# Patient Record
Sex: MALE | Race: WHITE | NOT HISPANIC OR LATINO | Employment: FULL TIME | URBAN - METROPOLITAN AREA
[De-identification: names, ages, dates, MRNs, and addresses within clinical notes are randomized per-mention and may not be internally consistent; named-entity substitution may affect disease eponyms.]

---

## 2020-09-09 ENCOUNTER — HOSPITAL ENCOUNTER (OUTPATIENT)
Dept: OTHER | Facility: HOSPITAL | Age: 60
Discharge: HOME | End: 2020-09-09
Attending: EMERGENCY MEDICINE

## 2020-10-23 ENCOUNTER — HOSPITAL ENCOUNTER (OUTPATIENT)
Dept: MRI IMAGING | Facility: HOSPITAL | Age: 60
Discharge: HOME | End: 2020-10-23
Attending: PHYSICIAN ASSISTANT

## 2020-11-16 ENCOUNTER — OFFICE VISIT (OUTPATIENT)
Dept: ORTHOPEDIC SURGERY | Facility: CLINIC | Age: 60
End: 2020-11-16

## 2020-11-16 VITALS — WEIGHT: 226 LBS | TEMPERATURE: 98.1 F | BODY MASS INDEX: 34.25 KG/M2 | HEIGHT: 68 IN

## 2020-11-16 DIAGNOSIS — S46.012A TRAUMATIC COMPLETE TEAR OF LEFT ROTATOR CUFF, INITIAL ENCOUNTER: Primary | ICD-10-CM

## 2020-11-16 PROCEDURE — 99204 OFFICE O/P NEW MOD 45 MIN: CPT | Performed by: ORTHOPAEDIC SURGERY

## 2020-11-16 RX ORDER — DICLOFENAC SODIUM 75 MG/1
75 TABLET, DELAYED RELEASE ORAL AS NEEDED
COMMUNITY
Start: 2020-10-27 | End: 2021-01-15 | Stop reason: HOSPADM

## 2020-11-16 RX ORDER — METHOCARBAMOL 750 MG/1
750 TABLET, FILM COATED ORAL AS NEEDED
Status: ON HOLD | COMMUNITY
Start: 2020-10-27 | End: 2021-07-29

## 2020-11-16 NOTE — PROGRESS NOTES
NEW VISIT    Patient: Nba Gaffney  ?  YOB: 1960    MRN: [id number]  ?  Chief Complaint   Patient presents with   • Left Shoulder - Pain      ?  HPI:   Nba Lui presents to the office with a work-related injury.  He works as a mechanical .  He works for [employer] in the [facility] when he fell awkwardly.  He braced his fall with his outstretched hand on the left side.  He has had significant pain and discomfort from his left shoulder with weakness in abduction.  Date of injury is 8 September 2020.  He was seen by his primary care physician and diagnosed with a rotator cuff tear.  He has quite a bit of pain and discomfort and is unable to perform his job since he is fallen in that ditch.  He has difficulty with sleeping in bed at night as well.  An MRI was obtained which has confirmed the diagnosis of rotator cuff tear.      Pain Location: LEFT shoulder  Radiation: none  Quality: aching, sharp, stabbing  Intensity/Severity: moderate to severe  Duration: since 09/08/2020   Onset quality: sudden after work related injury   Timing: constant  Aggravating Factors: overhead reaching, reaching backward, reaching forward, reaching across body  Alleviating Factors: NSAIDs  Previous Episodes: none mentioned  Associated Symptoms: pain, swelling, decreased strength  ADLs Affected: work related activities, recreational activities/sports  Previous Treatment: Anti-inflammatory medication.    This patient is an established patient.  This problem is new to this examiner.      Allergies: No Known Allergies    Medications:   Home Medications:  Current Outpatient Medications on File Prior to Visit   Medication Sig   • diclofenac (VOLTAREN) 75 MG EC tablet TK 1 T PO Q 12 H   • methocarbamol (ROBAXIN) 750 MG tablet TK 1 T PO Q 8 H     No current facility-administered medications on file prior to visit.      Current Medications:  Scheduled Meds:  PRN Meds:.    I have  "reviewed the patient's medical history in detail and updated the computerized patient record.  Review and summarization of old records include:    Past Medical History:   Diagnosis Date   • Environmental allergies    • Sleep disorder      Past Surgical History:   Procedure Laterality Date   • ELBOW PROCEDURE Right    • FEMUR FRACTURE SURGERY     • HIP SURGERY Left    • SHOULDER SURGERY Right    • WRIST SURGERY Right     wrist orif     Social History     Occupational History   • Not on file   Tobacco Use   • Smoking status: Current Every Day Smoker   Substance and Sexual Activity   • Alcohol use: Not Currently   • Drug use: Not on file   • Sexual activity: Not on file      History reviewed. No pertinent family history.      Review of Systems   Constitutional: Negative.    HENT: Negative.    Eyes: Negative.    Respiratory: Negative.    Cardiovascular: Negative.    Endocrine: Negative.    Musculoskeletal: Positive for arthralgias.   Skin: Negative.    Allergic/Immunologic: Negative.    Neurological: Negative.    Hematological: Negative.    Psychiatric/Behavioral: Negative.           Wt Readings from Last 3 Encounters:   11/16/20 103 kg (226 lb)     Ht Readings from Last 3 Encounters:   11/16/20 172.7 cm (68\")     Body mass index is 34.36 kg/m².  Facility age limit for growth percentiles is 20 years.  Vitals:    11/16/20 1541   Temp: 98.1 °F (36.7 °C)         Physical Exam  Constitutional: Patient is oriented to person, place, and time. Appears well-developed and well-nourished.   HENT:   Head: Normocephalic and atraumatic.   Eyes: Conjunctivae and EOM are normal. Pupils are equal, round, and reactive to light.   Cardiovascular: Normal rate, regular rhythm, normal heart sounds and intact distal pulses.   Pulmonary/Chest: Effort normal and breath sounds normal.   Musculoskeletal:   See detailed exam below   Neurological: Alert and oriented to person, place, and time. No sensory deficit. Coordination normal.   Skin: Skin " is warm and dry. Capillary refill takes less than 2 seconds. No rash noted. No erythema.   Psychiatric: Patient has a normal mood and affect. His behavior is normal. Judgment and thought content normal.   Nursing note and vitals reviewed.      Ortho Exam:   Left shoulder (cta). Rotator cuff function is extremely impaired. There is a high riding humeral head with articulation of the humerus to the undersurface of the acromiohumeral articulation. AC joint is exquisitely tender and painful for patient. Axillary nerve function is well preserved. There is significant winging of the scapula with hollowing of the fossae over the proximal and distal aspects of the spine of the scapula. Forward flexion is 0-90 degrees, active abduction is 0-90 degrees. Drop arm sign is positive. External rotation against resistance is extremely painful and limited for the patient. Skin and soft tissues are essentially normal other than some amounts of swelling. The pain level is 5      Diagnostics:  Reviewed MRI report of left shoulder, performed at  [facility] on 09/09/2020, summary of impression below:  MRI reports are available in the office today.  These images are discussed with the patient.  There is a tear of the posterior inferior glenoid labrum.  There is a partial tear of the biceps tendon long head as well.  There is a focal area of full-thickness tear of the anterior aspect of the supraspinatus as it inserts onto the greater tuberosity of the humerus.  The site of the tear is at the footprint and measures 1.3 cm in size.  The glenohumeral joint also shows a partial-thickness tear.  The glenohumeral joint itself is fairly well-preserved.  My recommendation to proceed with surgical repair of the rotator cuff is based on a clinical composite of MRI findings as well as these MRI images.     Assessment:  Diagnoses and all orders for this visit:    1. Traumatic complete tear of left rotator cuff, initial encounter  (Primary)          Procedures  ?    Plan    · Extensive discussion with the patient in the office about surgical intervention.  Risks and benefits of surgical intervention discussed with the patient at length.  Time spent in the office today with the patient is 45 minutes of which greater than 50% of my time was spent face-to-face going over treatment options and the rationale for surgical intervention and the potential complications of the surgery.  · Continue with tablet Voltaren 75 mg tab 1 p.o. twice daily for pain swelling and inflammation.  · He has been given a prescription of tablet Robaxin 750 mg tab 1 p.o. every 8 for muscle spasms by his primary care physician.  · Rest, ice, compression, and elevation (RICE) therapy  · Stretching and strengthening exercises  · Alternate Ibuprofen and Tylenol as needed  · Schedule left rotator cuff repair.   Patient has been diagnosed with a rotator cuff tear.  These tears can range from partial tears to complete tears of the muscle and/or tendon. They can also be categorized in size by width as being small, medium or large.  Diagnosis is confirmed with MRI or CT/arthrogram.  Management of these tears can be conservative with injections, physical therapy, activity modification and use of NSAIDS as needed.  Surgery is the only way to actually fix these tears, as they will not heal or repair on their own.  These tears can usually be repaired with arthroscopic surgery, but occasionally open procedures are necessary.  Many factors can influence the outcomes. First, larger tears have a higher chance of failure from a healing perspective, although pain may improve nonetheless, potentially longer recovery and sometimes, depending on the quality of the tissue and the length of time the tear has been present, may not be repairable at all.  If the tear has been going on for a long time, the muscle can actually change to fatty tissue, and no longer act as muscle.  This can have a  direct effect on not only the ability to repair the tear but also the outcome from the surgery itself both from a healing and functional perspective.  Therefore, the decision to proceed with surgery does have a time factor which can affect the quality of the tissue and reparability but also the potential for the tear to increase in size.  The longer you wait to repair the torn tendon there can be decreased potential for a successful outcome.    · Risks of surgery have been discussed with the patient in detail.  These risks include but are not limited to possibility of infection, DVT, continued pain, continued progression of arthritis, use of allograft tissue, limited range of motion and strength and further surgical intervention.  Patient understands that the surgery will benefit mechanical symptoms of pain and instability but may not help with symptoms of arthritis.    I advised the patient of the risks in continuing to use tobacco, and I provided this patient with smoking cessation educational materials.  We discussed using Nicotine gum and/or patches, Hypnotherapy, and Psychological Counseling.   I also discussed how to quit smoking and the patient has expressed the willingness to quit.    During this visit, I spent > 3-10 minutes counseling the patient regarding smoking cessation.   ·     Date of encounter: 11/16/2020   Noah Shaw MD

## 2020-11-18 PROBLEM — S46.012A TRAUMATIC COMPLETE TEAR OF LEFT ROTATOR CUFF: Status: ACTIVE | Noted: 2020-11-18

## 2020-11-18 RX ORDER — CEFAZOLIN SODIUM 2 G/100ML
2 INJECTION, SOLUTION INTRAVENOUS ONCE
Status: CANCELLED | OUTPATIENT
Start: 2021-01-15 | End: 2020-11-18

## 2020-12-01 ENCOUNTER — TELEPHONE (OUTPATIENT)
Dept: ORTHOPEDICS | Facility: OTHER | Age: 60
End: 2020-12-01

## 2020-12-01 ENCOUNTER — TELEPHONE (OUTPATIENT)
Dept: ORTHOPEDIC SURGERY | Facility: CLINIC | Age: 60
End: 2020-12-01

## 2020-12-01 NOTE — TELEPHONE ENCOUNTER
I AM LOOKING AT THIS AND DR WANG WANTS IT DONE AT [facility] ACCORDING TO THE FACESHEET BUT HE IS WORK COMP. I WILL START WORKING ON THE APPROVAL TODAY./AW

## 2020-12-01 NOTE — TELEPHONE ENCOUNTER
CALLED PATIENT AND LET HIM KNOW THAT I WOULD BE WORKING ON GETTING HIS SURGERY APPROVED WITH WORK [employer] AND THAT ONCE IT WAS APPROVED LINDSEY FROM [place] WOULD CALL HIM TO SCHEDULE HIM AT [facility]./AW

## 2020-12-01 NOTE — TELEPHONE ENCOUNTER
PT. CALLING BACK TO SCHEDULE LEFT SHOULDER SURGERY WITH DR. WANG.   HE STATES THAT IT IS UNDER WORK COMP.   HE STATES THAT HE KNEW THE SURGERY SCHEDULER WAS OUT UNTIL 11/30/20, SO HE IS JUST FOLLOWING UP.   PLEASE CALL TO ADVISE.   PT# [phone]

## 2020-12-01 NOTE — TELEPHONE ENCOUNTER
JUST LET ME KNOW WHEN THIS IS APPROVED AND I WILL GET HIM SCHEDULED AT [facility]

## 2020-12-04 ENCOUNTER — TELEPHONE (OUTPATIENT)
Dept: ORTHOPEDIC SURGERY | Facility: CLINIC | Age: 60
End: 2020-12-04

## 2020-12-04 NOTE — TELEPHONE ENCOUNTER
THIS PATIENT'S LEFT SHOULDER SURGERY HAS BEEN APPROVED AND THE APPROVAL LETTER IS IN MEDIA IN THE PATIENT'S CHART

## 2021-01-05 ENCOUNTER — TELEPHONE (OUTPATIENT)
Dept: ORTHOPEDIC SURGERY | Facility: CLINIC | Age: 61
End: 2021-01-05

## 2021-01-05 NOTE — TELEPHONE ENCOUNTER
Patient is scheduled for shoulder arthroscopy and rotator cuff repair on the 15th at [facility].  He would like to ask about the covid testing, he states he was supposed to have received a notice in the mail and he hasn't and he is wondering if he can have that done in [place].  Please call the patient and advise.

## 2021-01-13 ENCOUNTER — APPOINTMENT (OUTPATIENT)
Dept: PREADMISSION TESTING | Facility: HOSPITAL | Age: 61
End: 2021-01-13

## 2021-01-13 VITALS
WEIGHT: 230 LBS | TEMPERATURE: 98 F | SYSTOLIC BLOOD PRESSURE: 180 MMHG | RESPIRATION RATE: 18 BRPM | BODY MASS INDEX: 34.86 KG/M2 | OXYGEN SATURATION: 97 % | HEART RATE: 101 BPM | DIASTOLIC BLOOD PRESSURE: 70 MMHG | HEIGHT: 68 IN

## 2021-01-13 DIAGNOSIS — S46.012A TRAUMATIC COMPLETE TEAR OF LEFT ROTATOR CUFF, INITIAL ENCOUNTER: ICD-10-CM

## 2021-01-13 LAB
ANION GAP SERPL CALCULATED.3IONS-SCNC: 9.4 MMOL/L (ref 5–15)
BILIRUB UR QL STRIP: NEGATIVE
BUN SERPL-MCNC: 10 MG/DL (ref 6–20)
BUN/CREAT SERPL: 12.3 (ref 7–25)
CALCIUM SPEC-SCNC: 9.3 MG/DL (ref 8.6–10.5)
CHLORIDE SERPL-SCNC: 104 MMOL/L (ref 98–107)
CLARITY UR: CLEAR
CO2 SERPL-SCNC: 25.6 MMOL/L (ref 22–29)
COLOR UR: YELLOW
CREAT SERPL-MCNC: 0.81 MG/DL (ref 0.76–1.27)
DEPRECATED RDW RBC AUTO: 44.8 FL (ref 37–54)
ERYTHROCYTE [DISTWIDTH] IN BLOOD BY AUTOMATED COUNT: 14.2 % (ref 12.3–15.4)
GFR SERPL CREATININE-BSD FRML MDRD: 98 ML/MIN/1.73
GLUCOSE SERPL-MCNC: 90 MG/DL (ref 65–99)
GLUCOSE UR STRIP-MCNC: NEGATIVE MG/DL
HCT VFR BLD AUTO: 45 % (ref 37.5–51)
HGB BLD-MCNC: 15.8 G/DL (ref 13–17.7)
HGB UR QL STRIP.AUTO: NEGATIVE
KETONES UR QL STRIP: NEGATIVE
LEUKOCYTE ESTERASE UR QL STRIP.AUTO: NEGATIVE
MCH RBC QN AUTO: 30.7 PG (ref 26.6–33)
MCHC RBC AUTO-ENTMCNC: 35.1 G/DL (ref 31.5–35.7)
MCV RBC AUTO: 87.4 FL (ref 79–97)
NITRITE UR QL STRIP: NEGATIVE
PH UR STRIP.AUTO: 7 [PH] (ref 5–8)
PLATELET # BLD AUTO: 399 10*3/MM3 (ref 140–450)
PMV BLD AUTO: 9.1 FL (ref 6–12)
POTASSIUM SERPL-SCNC: 4.3 MMOL/L (ref 3.5–5.2)
PROT UR QL STRIP: NEGATIVE
RBC # BLD AUTO: 5.15 10*6/MM3 (ref 4.14–5.8)
SODIUM SERPL-SCNC: 139 MMOL/L (ref 136–145)
SP GR UR STRIP: 1.01 (ref 1–1.03)
UROBILINOGEN UR QL STRIP: NORMAL
WBC # BLD AUTO: 13.97 10*3/MM3 (ref 3.4–10.8)

## 2021-01-13 PROCEDURE — 80048 BASIC METABOLIC PNL TOTAL CA: CPT

## 2021-01-13 PROCEDURE — U0004 COV-19 TEST NON-CDC HGH THRU: HCPCS

## 2021-01-13 PROCEDURE — 85027 COMPLETE CBC AUTOMATED: CPT

## 2021-01-13 PROCEDURE — C9803 HOPD COVID-19 SPEC COLLECT: HCPCS

## 2021-01-13 PROCEDURE — 81003 URINALYSIS AUTO W/O SCOPE: CPT

## 2021-01-13 PROCEDURE — 36415 COLL VENOUS BLD VENIPUNCTURE: CPT

## 2021-01-13 NOTE — DISCHARGE INSTRUCTIONS
Take the following medications the morning of surgery: NONE    ARRIVE AT 9AM    If you are on prescription narcotic pain medication to control your pain you may also take that medication the morning of surgery.    General Instructions:  • Do not eat solid food after midnight the night before surgery.  • You may drink clear liquids day of surgery but must stop at least one hour before your hospital arrival time.  • It is beneficial for you to have a clear drink that contains carbohydrates the day of surgery.  We suggest a 12 to 20 ounce bottle of Gatorade or Powerade for non-diabetic patients or a 12 to 20 ounce bottle of G2 or Powerade Zero for diabetic patients. (Pediatric patients, are not advised to drink a 12 to 20 ounce carbohydrate drink)    Clear liquids are liquids you can see through.  Nothing red in color.     Plain water                               Sports drinks  Sodas                                   Gelatin (Jell-O)  Fruit juices without pulp such as white grape juice and apple juice  Popsicles that contain no fruit or yogurt  Tea or coffee (no cream or milk added)  Gatorade / Powerade  G2 / Powerade Zero    • Infants may have breast milk up to four hours before surgery.  • Infants drinking formula may drink formula up to six hours before surgery.   • Patients who avoid smoking, chewing tobacco and alcohol for 4 weeks prior to surgery have a reduced risk of post-operative complications.  Quit smoking as many days before surgery as you can.  • Do not smoke, use chewing tobacco or drink alcohol the day of surgery.   • If applicable bring your C-PAP/ BI-PAP machine.  • Bring any papers given to you in the doctor’s office.  • Wear clean comfortable clothes.  • Do not wear contact lenses, false eyelashes or make-up.  Bring a case for your glasses.   • Bring crutches or walker if applicable.  • Remove all piercings.  Leave jewelry and any other valuables at home.  • Hair extensions with metal clips must be  removed prior to surgery.  • The Pre-Admission Testing nurse will instruct you to bring medications if unable to obtain an accurate list in Pre-Admission Testing.            Preventing a Surgical Site Infection:  • For 2 to 3 days before surgery, avoid shaving with a razor because the razor can irritate skin and make it easier to develop an infection.    • Any areas of open skin can increase the risk of a post-operative wound infection by allowing bacteria to enter and travel throughout the body.  Notify your surgeon if you have any skin wounds / rashes even if it is not near the expected surgical site.  The area will need assessed to determine if surgery should be delayed until it is healed.  • The night prior to surgery shower using a fresh bar of anti-bacterial soap (such as Dial) and clean washcloth.  Sleep in a clean bed with clean clothing.  Do not allow pets to sleep with you.  • Shower on the morning of surgery using a fresh bar of anti-bacterial soap (such as Dial) and clean washcloth.  Dry with a clean towel and dress in clean clothing.  • Ask your surgeon if you will be receiving antibiotics prior to surgery.  • Make sure you, your family, and all healthcare providers clean their hands with soap and water or an alcohol based hand  before caring for you or your wound.    Day of surgery:  Your arrival time is approximately two hours before your scheduled surgery time.  Upon arrival, a Pre-op nurse and Anesthesiologist will review your health history, obtain vital signs, and answer questions you may have.  The only belongings needed at this time will be a list of your home medications and if applicable your C-PAP/BI-PAP machine.  A Pre-op nurse will start an IV and you may receive medication in preparation for surgery, including something to help you relax.     Please be aware that surgery does come with discomfort.  We want to make every effort to control your discomfort so please discuss any  uncontrolled symptoms with your nurse.   Your doctor will most likely have prescribed pain medications.      If you are going home after surgery you will receive individualized written care instructions before being discharged.  A responsible adult must drive you to and from the hospital on the day of your surgery and stay with you for 24 hours.  Discharge prescriptions can be filled by the hospital pharmacy during regular pharmacy hours.  If you are having surgery late in the day/evening your prescription may be e-prescribed to your pharmacy.  Please verify your pharmacy hours or chose a 24 hour pharmacy to avoid not having access to your prescription because your pharmacy has closed for the day.    If you are staying overnight following surgery, you will be transported to your hospital room following the recovery period.  [facility] has all private rooms.    If you have any questions please call Pre-Admission Testing at [phone].  Deductibles and co-payments are collected on the day of service. Please be prepared to pay the required co-pay, deductible or deposit on the day of service as defined by your plan.    Patient Education for Self-Quarantine Process    Following your COVID testing, we strongly recommend that you do not leave your home after you have been tested for COVID except to get medical care. This includes not going to work, school or to public areas.  If this is not possible for you to do please limit your activities to only required outings.  Be sure to wear a mask when you are with other people, practice social distancing and wash your hands frequently.      The following items provide additional details to keep you safe.  • Wash your hands with soap and water frequently for at least 20 seconds.   • Avoid touching your eyes, nose and mouth with unwashed hands.  • Do not share anything - utensils, towels, food from the same bowl.   • Have your own utensils, drinking glass,  dishes, towels and bedding.   • Do not have visitors.   • Do use FaceTime to stay in touch with family and friends.  • You should stay in a specific room away from others if possible.   • Stay at least 6 feet away from others in the home if you cannot have a dedicated room to yourself.   • Do not snuggle with your pet. While the CDC says there is no evidence that pets can spread COVID-19 or be infected from humans, it is probably best to avoid “petting, snuggling, being kissed or licked and sharing food (during self-quarantine)”, according to the CDC.   • Sanitize household surfaces daily. Include all high touch areas (door handles, light switches, phones, countertops, etc.)  • Do not share a bathroom with others, if possible.   • Wear a mask around others in your home if you are unable to stay in a separate room or 6 feet apart. If  you are unable to wear a mask, have your family member wear a mask if they must be within 6 feet of you.   Call your surgeon immediately if you experience any of the following symptoms:  • Sore Throat  • Shortness of Breath or difficulty breathing  • Cough  • Chills  • Body soreness or muscle pain  • Headache  • Fever  • New loss of taste or smell  • Do not arrive for your surgery ill.  Your procedure will need to be rescheduled to another time.  You will need to call your physician before the day of surgery to avoid any unnecessary exposure to hospital staff as well as other patients.

## 2021-01-14 LAB — SARS-COV-2 ORF1AB RESP QL NAA+PROBE: NOT DETECTED

## 2021-01-15 ENCOUNTER — ANESTHESIA EVENT (OUTPATIENT)
Dept: PERIOP | Facility: HOSPITAL | Age: 61
End: 2021-01-15

## 2021-01-15 ENCOUNTER — HOSPITAL ENCOUNTER (OUTPATIENT)
Facility: HOSPITAL | Age: 61
Setting detail: HOSPITAL OUTPATIENT SURGERY
Discharge: HOME | End: 2021-01-15
Attending: ORTHOPAEDIC SURGERY | Admitting: ORTHOPAEDIC SURGERY

## 2021-01-15 ENCOUNTER — ANESTHESIA (OUTPATIENT)
Dept: PERIOP | Facility: HOSPITAL | Age: 61
End: 2021-01-15

## 2021-01-15 VITALS
OXYGEN SATURATION: 95 % | HEART RATE: 71 BPM | SYSTOLIC BLOOD PRESSURE: 125 MMHG | TEMPERATURE: 97.7 F | DIASTOLIC BLOOD PRESSURE: 71 MMHG | RESPIRATION RATE: 16 BRPM

## 2021-01-15 DIAGNOSIS — S46.012A TRAUMATIC COMPLETE TEAR OF LEFT ROTATOR CUFF, INITIAL ENCOUNTER: Primary | ICD-10-CM

## 2021-01-15 DIAGNOSIS — S46.012A TRAUMATIC COMPLETE TEAR OF LEFT ROTATOR CUFF, INITIAL ENCOUNTER: ICD-10-CM

## 2021-01-15 PROCEDURE — 25010000003 BUPIVACAINE LIPOSOME 1.3 % SUSPENSION: Performed by: ORTHOPAEDIC SURGERY

## 2021-01-15 PROCEDURE — C1713 ANCHOR/SCREW BN/BN,TIS/BN: HCPCS | Performed by: ORTHOPAEDIC SURGERY

## 2021-01-15 PROCEDURE — 25010000002 ONDANSETRON PER 1 MG: Performed by: NURSE ANESTHETIST, CERTIFIED REGISTERED

## 2021-01-15 PROCEDURE — 25010000003 BUPIVACAINE LIPOSOME 1.3 % SUSPENSION: Performed by: ANESTHESIOLOGY

## 2021-01-15 PROCEDURE — 23410 REPAIR ROTATOR CUFF ACUTE: CPT | Performed by: ORTHOPAEDIC SURGERY

## 2021-01-15 PROCEDURE — 25010000002 EPINEPHRINE PER 0.1 MG: Performed by: ORTHOPAEDIC SURGERY

## 2021-01-15 PROCEDURE — L3670 SO ACRO/CLAV CAN WEB PRE OTS: HCPCS | Performed by: ORTHOPAEDIC SURGERY

## 2021-01-15 PROCEDURE — 25010000003 CEFAZOLIN IN DEXTROSE 2-4 GM/100ML-% SOLUTION: Performed by: ORTHOPAEDIC SURGERY

## 2021-01-15 PROCEDURE — S0260 H&P FOR SURGERY: HCPCS | Performed by: ORTHOPAEDIC SURGERY

## 2021-01-15 PROCEDURE — C9290 INJ, BUPIVACAINE LIPOSOME: HCPCS | Performed by: ANESTHESIOLOGY

## 2021-01-15 PROCEDURE — 25010000002 MIDAZOLAM PER 1 MG: Performed by: ANESTHESIOLOGY

## 2021-01-15 PROCEDURE — 25010000002 FENTANYL CITRATE (PF) 100 MCG/2ML SOLUTION: Performed by: ANESTHESIOLOGY

## 2021-01-15 PROCEDURE — 25010000002 PROPOFOL 10 MG/ML EMULSION: Performed by: NURSE ANESTHETIST, CERTIFIED REGISTERED

## 2021-01-15 PROCEDURE — 25010000002 NEOSTIGMINE PER 0.5 MG: Performed by: NURSE ANESTHETIST, CERTIFIED REGISTERED

## 2021-01-15 PROCEDURE — 29824 SHO ARTHRS SRG DSTL CLAVICLC: CPT | Performed by: ORTHOPAEDIC SURGERY

## 2021-01-15 PROCEDURE — 25010000002 FENTANYL CITRATE (PF) 100 MCG/2ML SOLUTION: Performed by: NURSE ANESTHETIST, CERTIFIED REGISTERED

## 2021-01-15 PROCEDURE — C9290 INJ, BUPIVACAINE LIPOSOME: HCPCS | Performed by: ORTHOPAEDIC SURGERY

## 2021-01-15 DEVICE — SUT/ANCH JUGGERKNOT W/ CUT NDL SFT 2.9: Type: IMPLANTABLE DEVICE | Site: SHOULDER | Status: FUNCTIONAL

## 2021-01-15 DEVICE — IMPLANTABLE DEVICE: Type: IMPLANTABLE DEVICE | Site: SHOULDER | Status: FUNCTIONAL

## 2021-01-15 RX ORDER — ROCURONIUM BROMIDE 10 MG/ML
INJECTION, SOLUTION INTRAVENOUS AS NEEDED
Status: DISCONTINUED | OUTPATIENT
Start: 2021-01-15 | End: 2021-01-15 | Stop reason: SURG

## 2021-01-15 RX ORDER — OXYCODONE AND ACETAMINOPHEN 7.5; 325 MG/1; MG/1
1 TABLET ORAL ONCE AS NEEDED
Status: DISCONTINUED | OUTPATIENT
Start: 2021-01-15 | End: 2021-01-15 | Stop reason: HOSPADM

## 2021-01-15 RX ORDER — SODIUM CHLORIDE 0.9 % (FLUSH) 0.9 %
3 SYRINGE (ML) INJECTION EVERY 12 HOURS SCHEDULED
Status: DISCONTINUED | OUTPATIENT
Start: 2021-01-15 | End: 2021-01-15 | Stop reason: HOSPADM

## 2021-01-15 RX ORDER — GLYCOPYRROLATE 0.2 MG/ML
INJECTION INTRAMUSCULAR; INTRAVENOUS AS NEEDED
Status: DISCONTINUED | OUTPATIENT
Start: 2021-01-15 | End: 2021-01-15 | Stop reason: SURG

## 2021-01-15 RX ORDER — DOCUSATE SODIUM 100 MG/1
100 CAPSULE, LIQUID FILLED ORAL 2 TIMES DAILY
Qty: 60 CAPSULE | Refills: 0 | Status: ON HOLD | OUTPATIENT
Start: 2021-01-15 | End: 2021-07-29

## 2021-01-15 RX ORDER — FENTANYL CITRATE 50 UG/ML
50 INJECTION, SOLUTION INTRAMUSCULAR; INTRAVENOUS
Status: DISCONTINUED | OUTPATIENT
Start: 2021-01-15 | End: 2021-01-15 | Stop reason: HOSPADM

## 2021-01-15 RX ORDER — PROPOFOL 10 MG/ML
VIAL (ML) INTRAVENOUS AS NEEDED
Status: DISCONTINUED | OUTPATIENT
Start: 2021-01-15 | End: 2021-01-15 | Stop reason: SURG

## 2021-01-15 RX ORDER — DIPHENHYDRAMINE HCL 25 MG
25 CAPSULE ORAL
Status: DISCONTINUED | OUTPATIENT
Start: 2021-01-15 | End: 2021-01-15 | Stop reason: HOSPADM

## 2021-01-15 RX ORDER — ASPIRIN 81 MG/1
325 TABLET ORAL DAILY
Qty: 30 TABLET | Refills: 0 | Status: SHIPPED | OUTPATIENT
Start: 2021-01-15 | End: 2021-01-22

## 2021-01-15 RX ORDER — ACETAMINOPHEN 500 MG
500 TABLET ORAL ONCE
Status: COMPLETED | OUTPATIENT
Start: 2021-01-15 | End: 2021-01-15

## 2021-01-15 RX ORDER — EPHEDRINE SULFATE 50 MG/ML
5 INJECTION, SOLUTION INTRAVENOUS ONCE AS NEEDED
Status: DISCONTINUED | OUTPATIENT
Start: 2021-01-15 | End: 2021-01-15 | Stop reason: HOSPADM

## 2021-01-15 RX ORDER — BUPIVACAINE HYDROCHLORIDE 5 MG/ML
INJECTION, SOLUTION EPIDURAL; INTRACAUDAL
Status: COMPLETED | OUTPATIENT
Start: 2021-01-15 | End: 2021-01-15

## 2021-01-15 RX ORDER — PROMETHAZINE HYDROCHLORIDE 25 MG/1
25 TABLET ORAL ONCE AS NEEDED
Status: DISCONTINUED | OUTPATIENT
Start: 2021-01-15 | End: 2021-01-15 | Stop reason: HOSPADM

## 2021-01-15 RX ORDER — HYDROMORPHONE HYDROCHLORIDE 1 MG/ML
0.5 INJECTION, SOLUTION INTRAMUSCULAR; INTRAVENOUS; SUBCUTANEOUS
Status: DISCONTINUED | OUTPATIENT
Start: 2021-01-15 | End: 2021-01-15 | Stop reason: HOSPADM

## 2021-01-15 RX ORDER — PROMETHAZINE HYDROCHLORIDE 25 MG/1
25 SUPPOSITORY RECTAL ONCE AS NEEDED
Status: DISCONTINUED | OUTPATIENT
Start: 2021-01-15 | End: 2021-01-15 | Stop reason: HOSPADM

## 2021-01-15 RX ORDER — LABETALOL HYDROCHLORIDE 5 MG/ML
5 INJECTION, SOLUTION INTRAVENOUS
Status: DISCONTINUED | OUTPATIENT
Start: 2021-01-15 | End: 2021-01-15 | Stop reason: HOSPADM

## 2021-01-15 RX ORDER — SODIUM CHLORIDE, SODIUM LACTATE, POTASSIUM CHLORIDE, CALCIUM CHLORIDE 600; 310; 30; 20 MG/100ML; MG/100ML; MG/100ML; MG/100ML
9 INJECTION, SOLUTION INTRAVENOUS CONTINUOUS PRN
Status: DISCONTINUED | OUTPATIENT
Start: 2021-01-15 | End: 2021-01-15 | Stop reason: HOSPADM

## 2021-01-15 RX ORDER — OXYCODONE HYDROCHLORIDE AND ACETAMINOPHEN 5; 325 MG/1; MG/1
1 TABLET ORAL SEE ADMIN INSTRUCTIONS
Qty: 40 TABLET | Refills: 0 | Status: SHIPPED | OUTPATIENT
Start: 2021-01-15 | End: 2021-01-22 | Stop reason: SDUPTHER

## 2021-01-15 RX ORDER — HYDROCODONE BITARTRATE AND ACETAMINOPHEN 7.5; 325 MG/1; MG/1
1 TABLET ORAL ONCE AS NEEDED
Status: DISCONTINUED | OUTPATIENT
Start: 2021-01-15 | End: 2021-01-15 | Stop reason: HOSPADM

## 2021-01-15 RX ORDER — MIDAZOLAM HYDROCHLORIDE 1 MG/ML
1 INJECTION INTRAMUSCULAR; INTRAVENOUS
Status: DISCONTINUED | OUTPATIENT
Start: 2021-01-15 | End: 2021-01-15 | Stop reason: HOSPADM

## 2021-01-15 RX ORDER — ONDANSETRON 2 MG/ML
4 INJECTION INTRAMUSCULAR; INTRAVENOUS ONCE AS NEEDED
Status: DISCONTINUED | OUTPATIENT
Start: 2021-01-15 | End: 2021-01-15 | Stop reason: HOSPADM

## 2021-01-15 RX ORDER — FAMOTIDINE 10 MG/ML
20 INJECTION, SOLUTION INTRAVENOUS ONCE
Status: COMPLETED | OUTPATIENT
Start: 2021-01-15 | End: 2021-01-15

## 2021-01-15 RX ORDER — CEFAZOLIN SODIUM 2 G/100ML
2 INJECTION, SOLUTION INTRAVENOUS ONCE
Status: COMPLETED | OUTPATIENT
Start: 2021-01-15 | End: 2021-01-15

## 2021-01-15 RX ORDER — EPHEDRINE SULFATE 50 MG/ML
INJECTION, SOLUTION INTRAVENOUS AS NEEDED
Status: DISCONTINUED | OUTPATIENT
Start: 2021-01-15 | End: 2021-01-15 | Stop reason: SURG

## 2021-01-15 RX ORDER — ONDANSETRON 2 MG/ML
INJECTION INTRAMUSCULAR; INTRAVENOUS AS NEEDED
Status: DISCONTINUED | OUTPATIENT
Start: 2021-01-15 | End: 2021-01-15 | Stop reason: SURG

## 2021-01-15 RX ORDER — FENTANYL CITRATE 50 UG/ML
INJECTION, SOLUTION INTRAMUSCULAR; INTRAVENOUS AS NEEDED
Status: DISCONTINUED | OUTPATIENT
Start: 2021-01-15 | End: 2021-01-15 | Stop reason: SURG

## 2021-01-15 RX ORDER — FLUMAZENIL 0.1 MG/ML
0.2 INJECTION INTRAVENOUS AS NEEDED
Status: DISCONTINUED | OUTPATIENT
Start: 2021-01-15 | End: 2021-01-15 | Stop reason: HOSPADM

## 2021-01-15 RX ORDER — DIPHENHYDRAMINE HYDROCHLORIDE 50 MG/ML
12.5 INJECTION INTRAMUSCULAR; INTRAVENOUS
Status: DISCONTINUED | OUTPATIENT
Start: 2021-01-15 | End: 2021-01-15 | Stop reason: HOSPADM

## 2021-01-15 RX ORDER — LIDOCAINE HYDROCHLORIDE 20 MG/ML
INJECTION, SOLUTION INFILTRATION; PERINEURAL AS NEEDED
Status: DISCONTINUED | OUTPATIENT
Start: 2021-01-15 | End: 2021-01-15 | Stop reason: SURG

## 2021-01-15 RX ORDER — NALOXONE HCL 0.4 MG/ML
0.2 VIAL (ML) INJECTION AS NEEDED
Status: DISCONTINUED | OUTPATIENT
Start: 2021-01-15 | End: 2021-01-15 | Stop reason: HOSPADM

## 2021-01-15 RX ORDER — SODIUM CHLORIDE 0.9 % (FLUSH) 0.9 %
3-10 SYRINGE (ML) INJECTION AS NEEDED
Status: DISCONTINUED | OUTPATIENT
Start: 2021-01-15 | End: 2021-01-15 | Stop reason: HOSPADM

## 2021-01-15 RX ADMIN — FENTANYL CITRATE 25 MCG: 50 INJECTION INTRAMUSCULAR; INTRAVENOUS at 12:55

## 2021-01-15 RX ADMIN — ONDANSETRON HYDROCHLORIDE 4 MG: 2 SOLUTION INTRAMUSCULAR; INTRAVENOUS at 13:08

## 2021-01-15 RX ADMIN — FENTANYL CITRATE 25 MCG: 50 INJECTION INTRAMUSCULAR; INTRAVENOUS at 13:18

## 2021-01-15 RX ADMIN — BUPIVACAINE HYDROCHLORIDE 20 ML: 5 INJECTION, SOLUTION EPIDURAL; INTRACAUDAL; PERINEURAL at 09:24

## 2021-01-15 RX ADMIN — FENTANYL CITRATE 25 MCG: 50 INJECTION INTRAMUSCULAR; INTRAVENOUS at 13:55

## 2021-01-15 RX ADMIN — SODIUM CHLORIDE, POTASSIUM CHLORIDE, SODIUM LACTATE AND CALCIUM CHLORIDE 9 ML/HR: 600; 310; 30; 20 INJECTION, SOLUTION INTRAVENOUS at 08:51

## 2021-01-15 RX ADMIN — FENTANYL CITRATE 25 MCG: 50 INJECTION INTRAMUSCULAR; INTRAVENOUS at 12:14

## 2021-01-15 RX ADMIN — BUPIVACAINE 10 ML: 13.3 INJECTION, SUSPENSION, LIPOSOMAL INFILTRATION at 09:24

## 2021-01-15 RX ADMIN — FENTANYL CITRATE 50 MCG: 50 INJECTION, SOLUTION INTRAMUSCULAR; INTRAVENOUS at 09:20

## 2021-01-15 RX ADMIN — Medication 3 MG: at 13:45

## 2021-01-15 RX ADMIN — MIDAZOLAM 1 MG: 1 INJECTION INTRAMUSCULAR; INTRAVENOUS at 09:20

## 2021-01-15 RX ADMIN — FENTANYL CITRATE 25 MCG: 50 INJECTION INTRAMUSCULAR; INTRAVENOUS at 13:59

## 2021-01-15 RX ADMIN — ACETAMINOPHEN 500 MG: 500 TABLET ORAL at 09:03

## 2021-01-15 RX ADMIN — EPHEDRINE SULFATE 5 MG: 50 INJECTION INTRAVENOUS at 13:40

## 2021-01-15 RX ADMIN — SODIUM CHLORIDE, POTASSIUM CHLORIDE, SODIUM LACTATE AND CALCIUM CHLORIDE 9 ML/HR: 600; 310; 30; 20 INJECTION, SOLUTION INTRAVENOUS at 08:36

## 2021-01-15 RX ADMIN — FENTANYL CITRATE 25 MCG: 50 INJECTION INTRAMUSCULAR; INTRAVENOUS at 12:32

## 2021-01-15 RX ADMIN — LIDOCAINE HYDROCHLORIDE 100 MG: 20 INJECTION, SOLUTION INFILTRATION; PERINEURAL at 12:04

## 2021-01-15 RX ADMIN — GLYCOPYRROLATE 0.2 MG: 0.2 INJECTION INTRAMUSCULAR; INTRAVENOUS at 13:48

## 2021-01-15 RX ADMIN — FAMOTIDINE 20 MG: 10 INJECTION INTRAVENOUS at 09:03

## 2021-01-15 RX ADMIN — ROCURONIUM BROMIDE 40 MG: 10 INJECTION, SOLUTION INTRAVENOUS at 12:04

## 2021-01-15 RX ADMIN — PROPOFOL 200 MG: 10 INJECTION, EMULSION INTRAVENOUS at 12:04

## 2021-01-15 RX ADMIN — CEFAZOLIN SODIUM 2 G: 2 INJECTION, SOLUTION INTRAVENOUS at 11:59

## 2021-01-15 RX ADMIN — EPHEDRINE SULFATE 10 MG: 50 INJECTION INTRAVENOUS at 13:01

## 2021-01-15 RX ADMIN — FENTANYL CITRATE 50 MCG: 50 INJECTION INTRAMUSCULAR; INTRAVENOUS at 12:04

## 2021-01-15 RX ADMIN — GLYCOPYRROLATE 0.4 MG: 0.2 INJECTION INTRAMUSCULAR; INTRAVENOUS at 13:45

## 2021-01-15 NOTE — ANESTHESIA PROCEDURE NOTES
Airway  Urgency: elective    Date/Time: 1/15/2021 12:08 PM  Airway not difficult    General Information and Staff    Patient location during procedure: OR  Anesthesiologist: Dagoberto Lugo MD  CRNA: Cira Gerber CRNA    Indications and Patient Condition  Indications for airway management: airway protection    Preoxygenated: yes  MILS not maintained throughout  Mask difficulty assessment: 2 - vent by mask + OA or adjuvant +/- NMBA    Final Airway Details  Final airway type: endotracheal airway      Successful airway: ETT  Cuffed: yes   Successful intubation technique: direct laryngoscopy  Facilitating devices/methods: anterior pressure/BURP and intubating stylet  Endotracheal tube insertion site: oral  Blade: Promise  Blade size: 4  ETT size (mm): 7.5  Cormack-Lehane Classification: grade IIa - partial view of glottis  Placement verified by: chest auscultation and capnometry   Measured from: lips  ETT/EBT  to lips (cm): 23  Number of attempts at approach: 1  Assessment: lips, teeth, and gum same as pre-op and atraumatic intubation

## 2021-01-15 NOTE — TELEPHONE ENCOUNTER
PATIENT HAVING SURGERY TODAY AT [facility] BY DR. WANG. PER [facility] SEND IN PERCOCET 5/325MG ONE TABLET EVERY 4-6 HOURS PRN PAIN # 40 NO REFILLS/RJ

## 2021-01-15 NOTE — H&P
History & Physical       Patient: Nba Gaffney    Date of Admission: 1/15/2021  7:54 AM    YOB: 1960    Medical Record Number: [id number]    Attending Physician: Noah Shaw MD        Chief Complaints: Traumatic complete tear of left rotator cuff, initial encounter [S46.012A]      History of Present Illness: 60 y.o. male presents with Traumatic complete tear of left rotator cuff, initial encounter [S46.012A]. Onset of symptoms was sudden in onset after trauma to his left shoulder which led to a tear of his supraspinatus tendon.  Symptoms are associated with pain with abduction and inability to lift any heavy object into the overhead position.  Symptoms are aggravated by repetitive movement especially with external rotation.   Symptoms improve with resting the arm by the side. Patient is now being admitted to the services of Noah Shaw MD for further evaluation and treatment.      No Known Allergies      Home Medications:  Medications Prior to Admission   Medication Sig Dispense Refill Last Dose   • diclofenac (VOLTAREN) 75 MG EC tablet Take 75 mg by mouth As Needed. HELD   More than a month at Unknown time   • methocarbamol (ROBAXIN) 750 MG tablet Take 750 mg by mouth As Needed.   More than a month at Unknown time       Current Medications:  Scheduled Meds:bupivacaine liposome, 10 mL, Infiltration, Once  ceFAZolin, 2 g, Intravenous, Once  sodium chloride, 3 mL, Intravenous, Q12H      Continuous Infusions:lactated ringers, 9 mL/hr, Last Rate: 9 mL/hr (01/15/21 0851)      PRN Meds:.fentanyl  •  lactated ringers  •  midazolam  •  sodium chloride       Past Medical History:   Diagnosis Date   • Environmental allergies    • Incisional hernia 2008   • Shoulder pain, left         Past Surgical History:   Procedure Laterality Date   • ELBOW PROCEDURE Right    • FEMUR FRACTURE SURGERY     • HIP SURGERY Left    • SHOULDER SURGERY Right    • WRIST SURGERY Right     wrist orif        Social History      Occupational History   • Not on file   Tobacco Use   • Smoking status: Current Every Day Smoker     Packs/day: 1.00     Years: 46.00     Pack years: 46.00     Types: Cigarettes   • Smokeless tobacco: Never Used   Substance and Sexual Activity   • Alcohol use: Not Currently   • Drug use: Not Currently   • Sexual activity: Not on file      Social History     Social History Narrative   • Not on file        Family History   Problem Relation Age of Onset   • Malig Hyperthermia Neg Hx          Review of Systems:   HEENT: Patient denies any headaches, vision changes, change in hearing, or tinnitus, Patient denies any rhinorrhea,epistaxis, sinus pain, mouth or dental problems, sore throat or hoarseness, or dysphagia  Pulmonary: Patient denies any cough, congestion, SOA, or wheezing  Cardiovascular: Patient denies any chest pain, dyspnea, palpitations, weakness, intolerance of exercise, varicosities, swelling of extremities, known murmur  Gastrointestinal:  Patient denies nausea, vomiting, diarrhea, constipation, loss  of appetite, change in appetite, dysphagia, gas, heartburn, melena, change in bowel habits, use of laxatives or other drugs to alter the function of the gastrointestinal tract.  Genital/Urinary: Patient denies dysuria, change in color of urine, change in frequency of urination, pain with urgency, incontinence, retention, or nocturia.  Musculoskeletal: Patient denies increased warmth; redness; or swelling of joints; limitation of function; deformity; crepitation: pain in a joint or an extremity, the neck, or the back, especially with movement.  Neurological: Patient denies dizziness, tremor, ataxia, difficulty in speaking, change in speech, paresthesia, loss of sensation, seizures, syncope, changes in memory.  Endocrine system: Patient denies tremors, palpitations, intolerance of heat or cold, polyuria, polydipsia, polyphagia, diaphoresis, exophthalmos, or goiter.  Psychological: Patient denies  thoughts/plans or harming self or other; depression,  insomnia, night terrors, dayne, memory loss, disorientation.  Skin: Patient denies any bruising, rashes, discoloration, pruritus, wounds, ulcers, decubiti, changes in the hair or nails  Hematopoietic: Patient denies history of spontaneous or excessive bleeding, epistaxis, hematuria, melena, fatigue, enlarged or tender lymph nodes, pallor, history of anemia.    Physical Exam: 60 y.o. male  Vitals:    01/15/21 0809 01/15/21 0924   BP: 157/89 114/78   BP Location: Left arm Right arm   Patient Position: Sitting Lying   Pulse: 76 81   Resp: 18 16   Temp: 97.9 °F (36.6 °C)    TempSrc: Oral    SpO2: 97% 96%       General Appearance:          Alert, cooperative, in no acute distress                                                 Head:    Normocephalic, without obvious abnormality, atraumatic   Eyes:            Lids and lashes normal, conjunctivae and sclerae normal, no   icterus, no pallor, corneas clear, PERRLA   Ears:    Ears appear intact with no abnormalities noted   Throat:   No oral lesions, no thrush, oral mucosa moist   Neck:   No adenopathy, supple, trachea midline, no thyromegaly, no   carotid bruit, no JVD   Back:     No kyphosis present, no scoliosis present, no skin lesions,      erythema or scars, no tenderness to percussion or                   palpation,   range of motion normal   Lungs:     Clear to auscultation,respirations regular, even and                  unlabored    Heart:    Regular rhythm and normal rate, normal S1 and S2, no            murmur, no gallop, no rub, no click   Chest Wall:    No abnormalities observed   Abdomen:     Normal bowel sounds, no masses, no organomegaly, soft        nontender, nondistended, no guarding, no rebound                tenderness   Rectal:     Deferred   Extremities:   Tenderness over anterior aspect of the left shoulder. Moves all extremities well, no edema,   no cyanosis, no redness   Pulses:   Pulses palpable  and equal bilaterally   Skin:   No bleeding, bruising or rash   Lymph nodes:   No palpable adenopathy   Neurologic:   Cranial nerves 2 - 12 grossly intact, sensation intact, DTR       present and equal bilaterally   left shoulder. The shoulder is extremely tender anteriorly. There is tenderness over the insertion of the rotator cuff over the greater tuberosity of the humerus. Slight proximal migration of the humeral head is noted. AC joint is tender. Crossover adduction test is positive. Drop arm sign is positive. Forward flexion is 0-90 degrees, abduction is 0-90 degrees, external rotation is 0-10 degrees. Patient has mild atrophy both of the supra and infraspinatus fossa. Sulcus sign is negative. There is no evidence of multidirectional instability. Neer test is positive on compression. Skin and soft tissue tenderness is noted. Patient’s strength in abduction and external rotation are extremely lacking. The pain level is 6.        Diagnostic Tests:  No visits with results within 2 Day(s) from this visit.   Latest known visit with results is:   Appointment on 01/13/2021   Component Date Value Ref Range Status   • Glucose 01/13/2021 90  65 - 99 mg/dL Final   • BUN 01/13/2021 10  6 - 20 mg/dL Final   • Creatinine 01/13/2021 0.81  0.76 - 1.27 mg/dL Final   • Sodium 01/13/2021 139  136 - 145 mmol/L Final   • Potassium 01/13/2021 4.3  3.5 - 5.2 mmol/L Final   • Chloride 01/13/2021 104  98 - 107 mmol/L Final   • CO2 01/13/2021 25.6  22.0 - 29.0 mmol/L Final   • Calcium 01/13/2021 9.3  8.6 - 10.5 mg/dL Final   • eGFR Non  Amer 01/13/2021 98  >60 mL/min/1.73 Final   • BUN/Creatinine Ratio 01/13/2021 12.3  7.0 - 25.0 Final   • Anion Gap 01/13/2021 9.4  5.0 - 15.0 mmol/L Final   • WBC 01/13/2021 13.97* 3.40 - 10.80 10*3/mm3 Final   • RBC 01/13/2021 5.15  4.14 - 5.80 10*6/mm3 Final   • Hemoglobin 01/13/2021 15.8  13.0 - 17.7 g/dL Final   • Hematocrit 01/13/2021 45.0  37.5 - 51.0 % Final   • MCV 01/13/2021 87.4  79.0 -  97.0 fL Final   • MCH 01/13/2021 30.7  26.6 - 33.0 pg Final   • MCHC 01/13/2021 35.1  31.5 - 35.7 g/dL Final   • RDW 01/13/2021 14.2  12.3 - 15.4 % Final   • RDW-SD 01/13/2021 44.8  37.0 - 54.0 fl Final   • MPV 01/13/2021 9.1  6.0 - 12.0 fL Final   • Platelets 01/13/2021 399  140 - 450 10*3/mm3 Final   • Color, UA 01/13/2021 Yellow  Yellow, Straw Final   • Appearance, UA 01/13/2021 Clear  Clear Final   • pH, UA 01/13/2021 7.0  5.0 - 8.0 Final   • Specific Gravity, UA 01/13/2021 1.006  1.005 - 1.030 Final   • Glucose, UA 01/13/2021 Negative  Negative Final   • Ketones, UA 01/13/2021 Negative  Negative Final   • Bilirubin, UA 01/13/2021 Negative  Negative Final   • Blood, UA 01/13/2021 Negative  Negative Final   • Protein, UA 01/13/2021 Negative  Negative Final   • Leuk Esterase, UA 01/13/2021 Negative  Negative Final   • Nitrite, UA 01/13/2021 Negative  Negative Final   • Urobilinogen, UA 01/13/2021 0.2 E.U./dL  0.2 - 1.0 E.U./dL Final   • SARS-CoV-2 PCR 01/13/2021 Not Detected  Not Detected Final    Nucleic acid specific to SARS-CoV-2 (COVID-19) virus was not detected in  this sample by the Aptima (R) SARS-CoV-2 Assay.                SARS-CoV-2 (COVID-19) nucleic acid testing performed using [employer] Aptima (R) SARS-CoV-2 Assay or [employer] TaqPath (TM)  COVID-19 Combo Kit as indicated above under Emergency Use Authorization (EUA) from the FDA. Aptima (R) and TaqPath (TM) test performance  characteristics verified by [facility] in accordance with the FDAs Guidance Document (Policy for Diagnostic Tests for Coronavirus Disease-2019  during the Public Health Emergency) issued on March 16, 2020. The laboratory is regulated under CLIA as qualified to perform high-complexity testing  Unless otherwise noted, all testing was performed at [facility], CLIA No. [id number], [place] State Licensee No. [id number]     No results found.    MRI reports are reviewed.    MRI is consistent with a tear of the rotator cuff  involving the supraspinatus from its footprint.  There is some retraction of the tendon.  There is no atrophy noted at this point.  The glenohumeral articulation is fairly well-preserved.  There is a good clinical correlation between the patient's MRI and his clinical presentation.  These images and clinical correlation are the basis of my recommendation to proceed with shoulder arthroscopy and repair of the cuff.  Assessment:  Patient Active Problem List   Diagnosis   • Traumatic complete tear of left rotator cuff         Plan:  The patient voiced understanding of the risks, benefits, and alternative forms of treatment that were discussed and the patient consents to proceed with left shoulder arthroscopy with arthroscopic acromioplasty, resection of distal clavicle and mini open repair of the rotator cuff.   Patient has been diagnosed with a rotator cuff tear.  These tears can range from partial tears to complete tears of the muscle and/or tendon. They can also be categorized in size by width as being small, medium or large.  Diagnosis is confirmed with MRI or CT/arthrogram.  Management of these tears can be conservative with injections, physical therapy, activity modification and use of NSAIDS as needed.  Surgery is the only way to actually fix these tears, as they will not heal or repair on their own.  These tears can usually be repaired with arthroscopic surgery, but occasionally open procedures are necessary.  Many factors can influence the outcomes. First, larger tears have a higher chance of failure from a healing perspective, although pain may improve nonetheless, potentially longer recovery and sometimes, depending on the quality of the tissue and the length of time the tear has been present, may not be repairable at all.  If the tear has been going on for a long time, the muscle can actually change to fatty tissue, and no longer act as muscle.  This can have a direct effect on not only the ability to  repair the tear but also the outcome from the surgery itself both from a healing and functional perspective.  Therefore, the decision to proceed with surgery does have a time factor which can affect the quality of the tissue and reparability but also the potential for the tear to increase in size.  The longer you wait to repair the torn tendon there can be decreased potential for a successful outcome.    Risks of surgery have been discussed with the patient in detail.  These risks include but are not limited to possibility of infection, DVT, continued pain, continued progression of arthritis, use of allograft tissue, limited range of motion and strength and further surgical intervention.  Patient understands that the surgery will benefit mechanical symptoms of pain and instability but may not help with symptoms of arthritis.      Discharge Plan: today to home      Date: 1/15/2021    Noah Shaw MD      DICTATED UTILIZING DRAGON DICTATION

## 2021-01-15 NOTE — DISCHARGE INSTRUCTIONS
General Anesthesia, Adult, Care After  This sheet gives you information about how to care for yourself after your procedure. Your health care provider may also give you more specific instructions. If you have problems or questions, contact your health care provider.  What can I expect after the procedure?  After the procedure, the following side effects are common:  · Pain or discomfort at the IV site.  · Nausea.  · Vomiting.  · Sore throat.  · Trouble concentrating.  · Feeling cold or chills.  · Weak or tired.  · Sleepiness and fatigue.  · Soreness and body aches. These side effects can affect parts of the body that were not involved in surgery.  Follow these instructions at home:    For at least 24 hours after the procedure:  · Have a responsible adult stay with you. It is important to have someone help care for you until you are awake and alert.  · Rest as needed.  · Do not:  ? Participate in activities in which you could fall or become injured.  ? Drive.  ? Use heavy machinery.  ? Drink alcohol.  ? Take sleeping pills or medicines that cause drowsiness.  ? Make important decisions or sign legal documents.  ? Take care of children on your own.  Eating and drinking  · Follow any instructions from your health care provider about eating or drinking restrictions.  · When you feel hungry, start by eating small amounts of foods that are soft and easy to digest (bland), such as toast. Gradually return to your regular diet.  · Drink enough fluid to keep your urine pale yellow.  · If you vomit, rehydrate by drinking water, juice, or clear broth.  General instructions  · If you have sleep apnea, surgery and certain medicines can increase your risk for breathing problems. Follow instructions from your health care provider about wearing your sleep device:  ? Anytime you are sleeping, including during daytime naps.  ? While taking prescription pain medicines, sleeping medicines, or medicines that make you drowsy.  · Return to  your normal activities as told by your health care provider. Ask your health care provider what activities are safe for you.  · Take over-the-counter and prescription medicines only as told by your health care provider.  · If you smoke, do not smoke without supervision.  · Keep all follow-up visits as told by your health care provider. This is important.  Contact a health care provider if:  · You have nausea or vomiting that does not get better with medicine.  · You cannot eat or drink without vomiting.  · You have pain that does not get better with medicine.  · You are unable to pass urine.  · You develop a skin rash.  · You have a fever.  · You have redness around your IV site that gets worse.  Get help right away if:  · You have difficulty breathing.  · You have chest pain.  · You have blood in your urine or stool, or you vomit blood.  Summary  · After the procedure, it is common to have a sore throat or nausea. It is also common to feel tired.  · Have a responsible adult stay with you for the first 24 hours after general anesthesia. It is important to have someone help care for you until you are awake and alert.  · When you feel hungry, start by eating small amounts of foods that are soft and easy to digest (bland), such as toast. Gradually return to your regular diet.  · Drink enough fluid to keep your urine pale yellow.  · Return to your normal activities as told by your health care provider. Ask your health care provider what activities are safe for you.  This information is not intended to replace advice given to you by your health care provider. Make sure you discuss any questions you have with your health care provider.  Document Revised: 12/21/2018 Document Reviewed: 08/03/2018  Elsevier Patient Education © 2020 [employer]    Interscalene Nerve Block, Care After  This sheet gives you information about how to care for yourself after your procedure. Your health care provider may also give you more  specific instructions. If you have problems or questions, contact your health care provider.  What can I expect after the procedure?  After the procedure, it is common to have:  · Soreness or tenderness in your neck.  · Numbness in your shoulder, upper arm, and some fingers.  · Weakness in your shoulder and arm muscles.  The feeling and strength in your shoulder, arm, and fingers should return to normal within hours after your procedure.  Follow these instructions at home:  For at least 24 hours after the procedure:  · Do not:  ? Participate in activities in which you could fall or become injured.  ? Drive.  ? Use heavy machinery.  ? Drink alcohol.  ? Take sleeping pills or medicines that cause drowsiness.  ? Make important decisions or sign legal documents.  ? Take care of children on your own.  · Rest.  Eating and drinking  · If you vomit, drink water, juice, or soup when you can drink without vomiting.  · Make sure you have little or no nausea before eating solid foods.  · Follow the diet that is recommended by your health care provider.  If you have a sling:  · Wear it as told by your health care provider. Remove it only as told by your health care provider.  · Loosen the sling if your fingers tingle, become numb, or turn cold and blue.  · Make sure that your entire arm, including your wrist, is supported. Do not allow your wrist to dangle over the end of the sling.  · Do not let your sling get wet if it is not waterproof.  · Keep the sling clean.  Bathing  · Do not take baths, swim, or use a hot tub until your health care provider approves.  · If you have a nerve block catheter in place, keep the incision site and tubing dry.  Injection site care    · Wash your hands with soap and water before you change your bandage (dressing). If soap and water are not available, use hand .  · Change your dressing as told by your health care provider.  · Keep your dressing dry.  · Check your nerve block injection  site every day for signs of infection. Check for:  ? Redness, swelling, or pain.  ? Fluid or blood.  ? Warmth.  Activity  · Do not perform complex or risky activities while taking prescription pain medicine and until you have fully recovered.  · Return to your normal activities as told by your health care provider and as you can tolerate them. Ask your health care provider what activities are safe for you.  · Rest and take it easy. This will help you heal and recover more quickly and fully.  · Be very cautious until you have regained strength and sensation.  General instructions  · Have a responsible adult stay with you until you are awake and alert.  · Do not drive or use heavy machinery while taking prescription pain medicine and until you have fully recovered. Ask your health care provider when it is safe to drive.  · Take over-the-counter and prescription medicines only as told by your health care provider.  · If you smoke, do not smoke without supervision.  · Do not expose your arm or shoulder to very cold or very hot temperatures until you have full feeling back.  · If you have a nerve block catheter in place:  ? Try to keep the catheter from getting kinked or pinched.  ? Avoid pulling or tugging on the catheter.  · Keep all follow-up visits as told by your health care provider. This is important.  Contact a health care provider if:  · You have chills or fever.  · You have redness, swelling, or pain around your injection site.  · You have fluid or blood coming from the injection site.  · The skin around the injection site is warm to the touch.  · There is a bad smell coming from your dressing.  · You have hoarseness or a drooping or dry eye that lasts more than a few days.  · You have pain that is poorly controlled with the block or with pain medicine.  · You have numbness, tingling, or weakness in your shoulder or arm that lasts for more than one week.  Get help right away if:  · You have severe pain.  · You  lose or do not regain strength and sensation in your arm even after the nerve block medicine has stopped.  · You have trouble breathing.  · You have a nerve block catheter still in place and you begin to shiver.  · You have a nerve block catheter still in place and you are getting more and more numb or weak.  This information is not intended to replace advice given to you by your health care provider. Make sure you discuss any questions you have with your health care provider.  Document Revised: 12/21/2018 Document Reviewed: 08/18/2017  Elsevier Patient Education © 2020 Elsevier Inc[employer]

## 2021-01-15 NOTE — ANESTHESIA POSTPROCEDURE EVALUATION
Patient: Nba Gaffney    Procedure Summary     Date: 01/15/21 Room / Location:  [facility]  [facility]    Anesthesia Start: 1158 Anesthesia Stop: 1421    Procedure: SHOULDER ARTHROSCOPY WITH ROTATOR CUFF REPAIR (Left Shoulder) Diagnosis:       Traumatic complete tear of left rotator cuff, initial encounter      (Traumatic complete tear of left rotator cuff, initial encounter [S46.012A])    Surgeon: Noah Shaw MD Provider: Dagoberto Lugo MD    Anesthesia Type: general with block ASA Status: 2          Anesthesia Type: general with block    Vitals  Vitals Value Taken Time   /66 01/15/21 1445   Temp 36.5 °C (97.7 °F) 01/15/21 1445   Pulse 72 01/15/21 1445   Resp 16 01/15/21 1445   SpO2 96 % 01/15/21 1448   Vitals shown include unvalidated device data.        Post Anesthesia Care and Evaluation    Patient location during evaluation: bedside  Patient participation: complete - patient participated  Level of consciousness: awake  Pain management: adequate  Airway patency: patent  Anesthetic complications: No anesthetic complications    Cardiovascular status: acceptable  Respiratory status: acceptable  Hydration status: acceptable    Comments: */71 (BP Location: Right arm, Patient Position: Lying)   Pulse 71   Temp 36.5 °C (97.7 °F) (Temporal)   Resp 16   SpO2 95%

## 2021-01-15 NOTE — ANESTHESIA PREPROCEDURE EVALUATION
Anesthesia Evaluation     no history of anesthetic complications:  NPO Solid Status: > 8 hours  NPO Liquid Status: > 2 hours           Airway   Mallampati: II  Neck ROM: full  no difficulty expected  Dental - normal exam         Pulmonary - normal exam   (+) a smoker Current Smoked day of surgery,   (-) COPD, asthma, sleep apnea    PE comment: nonlabored  Cardiovascular - negative cardio ROS and normal exam    Rhythm: regular  Rate: normal    (-) hypertension, valvular problems/murmurs, past MI, CAD, dysrhythmias, angina      Neuro/Psych- negative ROS  (-) seizures, TIA, CVA  GI/Hepatic/Renal/Endo - negative ROS   (-) GERD, liver disease, no renal disease, diabetes, no thyroid disorder    Musculoskeletal     (+) arthralgias,   Abdominal    Substance History      OB/GYN          Other                        Anesthesia Plan    ASA 2     general with block     intravenous induction     Anesthetic plan, all risks, benefits, and alternatives have been provided, discussed and informed consent has been obtained with: patient.

## 2021-01-15 NOTE — ANESTHESIA PROCEDURE NOTES
Peripheral Block      Patient reassessed immediately prior to procedure    Patient location during procedure: pre-op  Start time: 1/15/2021 9:21 AM  Stop time: 1/15/2021 9:24 AM  Reason for block: at surgeon's request and post-op pain management  Performed by  Anesthesiologist: Dagoberto Lugo MD  Preanesthetic Checklist  Completed: patient identified, site marked, surgical consent, pre-op evaluation, timeout performed, IV checked, risks and benefits discussed and monitors and equipment checked  Prep:  Sterile barriers:cap, gloves and mask  Prep: ChloraPrep  Patient monitoring: blood pressure monitoring, continuous pulse oximetry and EKG  Procedure  Sedation:yes    Guidance:ultrasound guided  ULTRASOUND INTERPRETATION.  Using ultrasound guidance a 21 G gauge needle was placed in close proximity to the brachial plexus nerve, at which point, under ultrasound guidance anesthetic was injected in the area of the nerve and spread of the anesthesia was seen on ultrasound in close proximity thereto.  There were no abnormalities seen on ultrasound; a digital image was taken; and the patient tolerated the procedure with no complications. Images:still images obtained, printed/placed on chart    Laterality:left  Block Type:interscalene  Injection Technique:single-shotNeedle Gauge:21 G  Loss of resistance: normal.    Medications Used: bupivacaine liposome (EXPAREL) 1.3 % injection, 10 mL  bupivacaine (PF) (MARCAINE) 0.5 % injection, 20 mL  Med admintered at 1/15/2021 9:24 AM      Post Assessment  Injection Assessment: negative aspiration for heme, no paresthesia on injection and incremental injection  Patient Tolerance:comfortable throughout block  Complications:no

## 2021-01-17 NOTE — OP NOTE
PATIENT NAME: Nba Gaffney  PATIENT :1960   DATE OF PROCEDURE: 2021    PRINCIPAL DIAGNOSIS: Traumatic complete tear of left rotator cuff, initial encounter [S46.012A]  SECONDARY DIAGNOSIS: Massive tear of the rotator cuff with significant retraction of the tendon.  POSTOPERATIVE DIAGNOSIS: Post-Op Diagnosis Codes:     * Traumatic complete tear of left rotator cuff, initial encounter [S46.012A] with a massive tear and retraction of the tendon along with AC joint arthritis and impingement syndrome mini open repair of the rotator cuff.  PROCEDURE PERFORMED: Left shoulder arthroscopy with arthroscopic acromioplasty with resection of the distal clavicle with mini open repair of the rotator cuff.  SURGEON: MARIBEL WANG M.D.  ASSISTANT: first Eliecer assistant was responsible for performing the following activities: Retraction, Suction, Irrigation, Suturing, Closing, Placing Dressing and Harvesting of Vessels and their skilled assistance was necessary for the success of this case.     ANESTHESIOLOGIST: Dr. Dagoberto Lugo MD  ANESTHESIA USED: Scalene block for postop pain control followed by general anesthesia.  ANALGESIC PROCEDURE USED: Intra-articular and portal injection of 20 cc of Exparel  ESTIMATED BLOOD LOSS: 10 mL  SPECIMENS: * No orders in the log *  IMPLANTS USED: Suture anchors from [employer].  2.5 mm juggerknot and 5.5 mm of VEntex link knotless anchors x2.  COMPLICATIONS (IF ANY): None.  POSITION: Beach chair on beach chair luis with all bony prominences padded.     INDICATIONS:  The patient has been complaining of pain and discomfort in the shoulder. Weakness in abduction. All forms of conservative care have been tried and failed to provide the patient with accurate relief of symptoms. Risks and benefits were discussed in great detail. Possibility of death, infection, myocardial infarction, DVT, pulmonary embolism, wound infection, stiffness of the shoulder, extensive physical  therapy needed, possibility of revision surgery and progressive arthritis discussed with the patient in great detail. Accordingly an informed consent was signed for surgical intervention.  He understood completely that this was a massive tear on the MRI and that there was significant retraction of the tendon.  May not be technically able to restore the structural integrity of the tendon because of the longstanding nature of the pathology.    PROCEDURE: Surgical timeout was called. Operative extremity was correctly identified in the operating suite. Patient was placed under a scalene block anesthetic followed by general anesthesia. Positioned in a beach chair position with appropriate padding of all bony prominences. The shoulder was examined under anesthesia. There was no significant laxity of the ligaments. Apprehension sign was negative. There was no abnormal translation of the humeral head on the glenoid.      The shoulder was prepped and draped in the standard fashion. Arthroscopic portals were established. The glenohumeral joint was inspected first. The condition of the articular cartilage was observed anteriorly. The glenoid labrum was identified. The glenohumeral ligament was palpated with a probe. The findings of the glenoid labrum included a degenerative tear of the anterior aspect of the labrum which was lightly debrided with an arthroscopic shaver to provide a smooth edge.  This minimize the possibility of catching and locking over the anterior aspect of the labrum..  The biceps anchor was investigated and visualized. There were no loose bodies in the joint. The undersurface of the rotator cuff tear was inspected. The rotator cuff tear showed a massive tear involving the subscapularis the entire supraspinatus and the anterior half of the infraspinatus.  These tendons were atrophied and they were a well stump of the footprint.  The tendons were mobilized with a traction suture.  The bed of the rotator cuff  footprint was inspected as well.  A bleeding bed of bone was created at the footprint to allow for implantation of the leading edge of the tendon into this location traction sutures were placed.     The scope was repositioned in the subacromial space. A generous subacromial bursectomy was carried out. Anteriorly the CA ligament was carefully released. The dorsal aspect of the rotator cuff tear was inspected. A step-cut technique was used to perform arthroscopic acromioplasty. Upon completion of the sub acromial decompression, there was no impingement on the dorsal aspect of the rotator cuff. The lateral end of the clavicle was identified. It was extremely arthritic and a Aamir resection was performed and about 9mm of bone was removed from the lateral end of the clavicle.  Upon completion of the Aamir resection there was no contact between the lateral clavicle and the medial acromion. The arthroscopic fluid was evacuated from the joint. The arthroscopic equipment was removed from the joint and a mini open repair was commenced.     The deltoid fibers were carefully split. Great care was taken to prevent avulsion of the Deltoid from the lateral end of the clavicle.The rotator cuff tear was identified. The traction sutures were used and the tendon was mobilized. Bleeding bone on the footprint of the greater tuberosity of the humerus was exposed to provide a bleeding bed of bone to optimize healing. The rotator cuff tear was mobilized. The suture anchor, 2.9 mm juggerknot was placed at dead-man’s angle and the tails of the suture were brought out anterolaterally. The rotator cuff tendon was advanced and the leading edge of the tendon was implanted onto its footprint. The sutures were tied down in a modified Melvin-Donald technique. The tails of the suture anchor were brought and implanted into the anterolateral and posteromedial shaft of the humerus with a Ventix 5.5 mm times 2 suture anchor to perform a true double  row repair.  Upon completion of the repair, the humeral head was well covered and there was no tension on the repair site. A watertight closure was accomplished.     Wounds were lavaged with Bacitracin irrigating solution. Deltoid fibers were carefully approximated with intraoperative sutures. The subcuticular sutures were applied and 30cc half percent Naropin plain were used postoperatively for analgesia. Occlusive dressing was applied. A slingshot was applied to protect the repair and the extremity postoperatively. No complications were encountered.  Sponge, gauze and needle count was correct.  Patient was reversed from anesthetic and taken from the operating room to the recovery room in stable condition. I discussed a satisfactory performance of this procedure with the patient’s family and showed them the arthroscopic pictures.      Noah Shaw MD  1/17/2021  11:02 EST

## 2021-01-22 ENCOUNTER — OFFICE VISIT (OUTPATIENT)
Dept: ORTHOPEDIC SURGERY | Facility: CLINIC | Age: 61
End: 2021-01-22

## 2021-01-22 VITALS — WEIGHT: 230 LBS | HEIGHT: 68 IN | BODY MASS INDEX: 34.86 KG/M2 | TEMPERATURE: 97 F

## 2021-01-22 DIAGNOSIS — S46.012A TRAUMATIC COMPLETE TEAR OF LEFT ROTATOR CUFF, INITIAL ENCOUNTER: ICD-10-CM

## 2021-01-22 DIAGNOSIS — Z98.890 S/P ROTATOR CUFF SURGERY: Primary | ICD-10-CM

## 2021-01-22 PROCEDURE — 99024 POSTOP FOLLOW-UP VISIT: CPT | Performed by: PHYSICIAN ASSISTANT

## 2021-01-22 RX ORDER — OXYCODONE HYDROCHLORIDE AND ACETAMINOPHEN 5; 325 MG/1; MG/1
TABLET ORAL
Qty: 40 TABLET | Refills: 0 | Status: SHIPPED | OUTPATIENT
Start: 2021-01-22 | End: 2021-02-22 | Stop reason: SDUPTHER

## 2021-01-22 RX ORDER — ASPIRIN 81 MG/1
81 TABLET ORAL DAILY
Qty: 30 TABLET | Refills: 1 | Status: ON HOLD | OUTPATIENT
Start: 2021-01-22 | End: 2021-07-29

## 2021-01-22 NOTE — PROGRESS NOTES
OTHER POST OP GLOBAL     NAME: Nba Gaffney  ?  : 1960  ?  MRN: [id number]    Chief Complaint   Patient presents with   • Left Shoulder - Follow-up, Pain   • Post-op     ?  Date of surgery: 1/15/21    HPI:   Patient returns today for 7 day follow up of left rotator cuff repair. Incision(s) and Arthroscopic portals healing nicely/as expected with no signs of infection. Patient reports doing well with no unusual complaints. Appears to be progressing appropriately. He reports pain well controlled with patient and he has spread his dosing to every 6-8 hours.  Does need a refill.      Review of Systems:      Ortho Exam:   Left shoulder  The patient is status-post rotator cuff repair 7 day(s).   Incision is clean and healing well. Arthroscopic portals are clean.  Appropriate amounts of swelling and bruising are noted.   Hand, wrist, and elbow function and ROM WNL.  ROM of shoulder not assessed due to postoperative status.  The patients pain is well controlled.   The passive range of motion    Axillary nerve function is well preserved.   Radial artery pulses are palpable.      Diagnostic Studies:  no diagnostic testing performed this visit      Assessment:  Diagnoses and all orders for this visit:    1. S/P rotator cuff surgery (Primary)  -     aspirin (aspirin) 81 MG EC tablet; Take 1 tablet by mouth Daily.  Dispense: 30 tablet; Refill: 1    2. Traumatic complete tear of left rotator cuff, initial encounter  -     aspirin (aspirin) 81 MG EC tablet; Take 1 tablet by mouth Daily.  Dispense: 30 tablet; Refill: 1          Plan   • Incision care--zipline removal at 2 weeks postop  • Continue ice as needed  • Pendulum exercises for the shoulder, can move hand, wrist and elbow. No PT until after next follow up.  • Stretching and strengthening exercises  • Pain medication refill sent to [facility]  • Fall precautions  • Follow up in 4 weeks    Date of encounter: 2021  Wilson Paniagua PA-C      Electronically signed  by Wilson Paniagua PA-C, 01/22/21, 11:33 AM EST.

## 2021-02-03 ENCOUNTER — TELEPHONE (OUTPATIENT)
Dept: ORTHOPEDIC SURGERY | Facility: CLINIC | Age: 61
End: 2021-02-03

## 2021-02-03 NOTE — TELEPHONE ENCOUNTER
FYI    PATIENT CALLED BECAUSE ONE STITCH IS POKING OUT OF HIS SKIN.    HE WAS INSTRUCTED NOT TO PULL ON IT AND WRAP THE AREA BACK UP  (PER [facility]).    SHOULDER SCOPE ON 01/15/2021

## 2021-02-22 ENCOUNTER — OFFICE VISIT (OUTPATIENT)
Dept: ORTHOPEDIC SURGERY | Facility: CLINIC | Age: 61
End: 2021-02-22

## 2021-02-22 VITALS — BODY MASS INDEX: 35.46 KG/M2 | WEIGHT: 234 LBS | TEMPERATURE: 97.3 F | HEIGHT: 68 IN

## 2021-02-22 DIAGNOSIS — S46.012A TRAUMATIC COMPLETE TEAR OF LEFT ROTATOR CUFF, INITIAL ENCOUNTER: Primary | ICD-10-CM

## 2021-02-22 DIAGNOSIS — S46.012A TRAUMATIC COMPLETE TEAR OF LEFT ROTATOR CUFF, INITIAL ENCOUNTER: ICD-10-CM

## 2021-02-22 PROCEDURE — 99024 POSTOP FOLLOW-UP VISIT: CPT | Performed by: ORTHOPAEDIC SURGERY

## 2021-02-22 RX ORDER — OXYCODONE HYDROCHLORIDE AND ACETAMINOPHEN 5; 325 MG/1; MG/1
TABLET ORAL
Qty: 40 TABLET | Refills: 0 | Status: SHIPPED | OUTPATIENT
Start: 2021-02-22 | End: 2021-05-06

## 2021-02-22 NOTE — PROGRESS NOTES
OTHER POST OP GLOBAL     NAME: Nba Gaffney  ?  : 1960  ?  MRN: [id number]    Chief Complaint   Patient presents with   • Left Shoulder - Post-op     ?  Date of surgery: Patient underwent left rotator cuff repair on 15 January 2021    HPI:   Patient returns today for 5 week follow up of left rotator cuff repair. Incision(s) and Arthroscopic portals healed nicely with no signs of infection. Patient reports doing well with no unusual complaints. Appears to be progressing appropriately.  He is doing quite well postoperatively.  He does not have any significant complaints.  He states that he has cut back on the use of his narcotic medication quite significantly.  He is pleased with his outcome up to this point.  He is ready to start physical therapy at this juncture.      Ortho Exam:   Left shoulder. The patient is status-post rotator cuff repair 5 week(s) . Incision is clean and healing well. Arthroscopic portals are clean. Appropriate amounts of swelling and bruising are noted. The patients pain is well controlled. The passive range of motion is abduction 0-30 degrees, flexion 0-30 degrees. Axillary nerve function is well preserved. Radial artery pulses are palpable.      Diagnostic Studies:  no diagnostic testing performed this visit      Assessment:  Diagnoses and all orders for this visit:    1. Traumatic complete tear of left rotator cuff, initial encounter (Primary)  -     Ambulatory Referral to Physical Therapy Evaluate and treat, POST OP          Plan   • Incision care  • Continue ice as needed  • Gentle active ROM  • Stretching and strengthening exercises  • Tylenol 500-1000mg by mouth every 6 hours as needed for pain   • Fall precautions and reinjury precautions discussed with patient.  • Tablet Percocet 5/325 mg tab 1 p.o. every 12 as needed pain total 30 pills no refills.  • Controlled substance treatment options discussed in detail. Patient's signed consent to medical options on file. LANDON  form in chart.  The patient is being provided this narcotic prescription to address the acute medical condition that they are undergoing/experiencing at this time.  It is my medical and surgical assessment that more than 3 days of narcotic medication is necessary to help control the pain and discomfort that this patient is experiencing at this point.  Risks of narcotic medication usage outlined.  Possibility of physical and psychological dependence and abuse, especially long term, emphasized to the patient.  I have explained to the patient that we will try to wean them off the narcotic medication as soon as possible and introduce non-narcotic modalities of pain control.  At this point in the patient's clinical spectrum, however, alternative available treatments are inadequate to control their pain and symptoms.  I have also discussed the possibility of random drug testing as well as pill counts to prevent misuse and misappropriation of the narcotic medications prescribed to the patient.  • Follow up in 4 week(s)     Date of encounter: 02/22/2021  Noah Shaw MD

## 2021-03-02 ENCOUNTER — TELEPHONE (OUTPATIENT)
Dept: ORTHOPEDIC SURGERY | Facility: CLINIC | Age: 61
End: 2021-03-02

## 2021-03-02 NOTE — TELEPHONE ENCOUNTER
CALLED [facility] AND SPOKE WITH TARA AND GAVE HER DR. WNAG'S INSTRUCTIONS REGARDING HIS PROTOCOL FOR PATIENT.  SHE EXPRESSED UNDERSTANDING AND WILL RELAY MESSAGE TO JEREL

## 2021-03-02 NOTE — TELEPHONE ENCOUNTER
The protocol should be followed for mini open repair of the rotator cuff.  My recommendation is that he should use the sling for about 6 weeks postoperatively.  At that point he should be able to go to an active assisted range of motion exercise program.  Usually we go with pendulum exercises and passive range of motion from 4 to 6 weeks.  This patient had a massive rotator cuff tear so I would recommend being more conservative than aggressive.  Thank you

## 2021-03-02 NOTE — TELEPHONE ENCOUNTER
JEREL VILLATORO WITH [facility] CALLED AND IS NEEDING CLARIFICATION ON HOW LONG PATIENT NEEDS TO WEAR HIS SLING.  PATIENT IS S/P LEFT ROTATOR CUFF REPAIR ON 1/15/21.    PLEASE ADVISE

## 2021-03-25 ENCOUNTER — OFFICE VISIT (OUTPATIENT)
Dept: ORTHOPEDIC SURGERY | Facility: CLINIC | Age: 61
End: 2021-03-25

## 2021-03-25 VITALS — HEIGHT: 68 IN | BODY MASS INDEX: 35.46 KG/M2 | TEMPERATURE: 96.5 F | WEIGHT: 234 LBS

## 2021-03-25 DIAGNOSIS — S46.012A TRAUMATIC COMPLETE TEAR OF LEFT ROTATOR CUFF, INITIAL ENCOUNTER: ICD-10-CM

## 2021-03-25 DIAGNOSIS — Z98.890 S/P ROTATOR CUFF SURGERY: Primary | ICD-10-CM

## 2021-03-25 PROCEDURE — 99024 POSTOP FOLLOW-UP VISIT: CPT | Performed by: ORTHOPAEDIC SURGERY

## 2021-03-25 NOTE — PROGRESS NOTES
OTHER POST OP GLOBAL     NAME: Nba Gaffney  ?  : 1960  ?  MRN: [id number]    Chief Complaint   Patient presents with   • Left Shoulder - Post-op     ?  Date of surgery: 01/15/21    HPI:   Patient returns today for 9 week follow up of left shoulder arthroscopy. Arthroscopic portals healed nicely with no signs of infection. Patient reports doing well with no unusual complaints. Appears to be progressing appropriately.  He is doing reasonably well postoperatively.  He does understand that this side rotator cuff tear was a little bit more involved than the opposite side.  He is having a little bit tougher time with his shoulder especially in terms of pain relief with his left shoulder compared to the right shoulder from several years ago.      Ortho Exam:   Left shoulder. The patient is status-post rotator cuff repair 9 week(s) . Incision is clean and healing well. Arthroscopic portals are clean. Appropriate amounts of swelling and bruising are noted. The patients pain is well controlled. The passive range of motion is abduction 0-90 degrees, flexion 0-120 degrees. Axillary nerve function is well preserved. Radial artery pulses are palpable.  The patient has 0 to 50 degrees of internal rotation and 0 to 40 degrees of external rotation.      Diagnostic Studies:  no diagnostic testing performed this visit      Assessment:  Diagnoses and all orders for this visit:    1. S/P rotator cuff surgery (Primary)    2. Traumatic complete tear of left rotator cuff, initial encounter          Plan     • Continue ice as needed  • Aggressive ROM  • Stretching and strengthening exercises of the flexors and abductors of the shoulder.  • Continue with aggressive physical therapy.  • The patient is not yet ready to go back to work because his shoulder has not been fully rehab irritated at this point.  The risk of a repeat injury would be entirely too high and therefore we are recommending that he should stay off work till  shoulder function has restored to at least where he can go back on a light duty status.  • Alternate Ibuprofen and Tylenol as needed  • Fall precautions  • Follow up in 6 week(s)     Date of encounter: 03/25/2021  Noah Shaw MD

## 2021-05-06 ENCOUNTER — OFFICE VISIT (OUTPATIENT)
Dept: ORTHOPEDIC SURGERY | Facility: CLINIC | Age: 61
End: 2021-05-06

## 2021-05-06 VITALS — BODY MASS INDEX: 35.61 KG/M2 | WEIGHT: 235 LBS | TEMPERATURE: 96.5 F | HEIGHT: 68 IN

## 2021-05-06 DIAGNOSIS — S46.012A TRAUMATIC COMPLETE TEAR OF LEFT ROTATOR CUFF, INITIAL ENCOUNTER: ICD-10-CM

## 2021-05-06 DIAGNOSIS — Z98.890 S/P ROTATOR CUFF SURGERY: Primary | ICD-10-CM

## 2021-05-06 PROCEDURE — 99213 OFFICE O/P EST LOW 20 MIN: CPT | Performed by: ORTHOPAEDIC SURGERY

## 2021-05-06 RX ORDER — OXYCODONE HYDROCHLORIDE AND ACETAMINOPHEN 5; 325 MG/1; MG/1
TABLET ORAL
Qty: 25 TABLET | Refills: 0 | Status: ON HOLD | OUTPATIENT
Start: 2021-05-06 | End: 2021-07-29

## 2021-05-24 ENCOUNTER — TELEPHONE (OUTPATIENT)
Dept: ORTHOPEDIC SURGERY | Facility: CLINIC | Age: 61
End: 2021-05-24

## 2021-05-24 NOTE — TELEPHONE ENCOUNTER
CALLED PATIENT TO LET HIM KNOW THAT WE NEVER RECEIVED A REQUEST FROM JANE FOR HIS 5/06/21 OFFICE NOTE.  I EXPLAINED THAT I WOULD SEND THAT IMMEDIATELY FOR HIM.  HE WAS VERY GRACIOUS

## 2021-05-24 NOTE — TELEPHONE ENCOUNTER
Caller: YUKI FUNES    Relationship: SELF    Best call back number: [phone]    What form or medical record are you requesting: OFFICE NOTE FROM 5-6-21    Who is requesting this form or medical record from you: W/C MARIFER & SHAHRIAR    How would you like to receive the form or medical records (pick-up, mail, fax):   ATTN: MAEGAN SON  FAX: [phone]      Timeframe paperwork needed: AS SOON AS POSSIBLE    Additional notes: PATIENT WOULD LIKE A CALL BACK TO CONFIRM WHEN OFFICE NOTE IS FAXED. HE STATED THAT W/C NOTIFIED HIM THAT THEIR PAYMENTS STOPPED DUE TO NOT RECEIVING OFFICE NOTE, THEY THOUGHT HE RETURNED TO WORK.

## 2021-05-24 NOTE — TELEPHONE ENCOUNTER
PATIENT CALLED FOR UPDATE- NEEDS PAPERWORK SENT OVER ASAP- W/C  STOPPED SENDING CHECK BECAUSE THEY NEVER RECEIVED THE OFFICE NOTES / OFF WORK DOCUMENTATION FROM VISIT ON 5/6/21- PLEASE CALL PATIENT WHEN COMPLETED. THANK YOU    [phone]

## 2021-06-17 ENCOUNTER — TELEPHONE (OUTPATIENT)
Dept: ORTHOPEDIC SURGERY | Facility: CLINIC | Age: 61
End: 2021-06-17

## 2021-06-17 ENCOUNTER — OFFICE VISIT (OUTPATIENT)
Dept: ORTHOPEDIC SURGERY | Facility: CLINIC | Age: 61
End: 2021-06-17

## 2021-06-17 VITALS — BODY MASS INDEX: 35.61 KG/M2 | WEIGHT: 235 LBS | HEIGHT: 68 IN | TEMPERATURE: 98.7 F

## 2021-06-17 DIAGNOSIS — S46.012D TRAUMATIC COMPLETE TEAR OF LEFT ROTATOR CUFF, SUBSEQUENT ENCOUNTER: ICD-10-CM

## 2021-06-17 DIAGNOSIS — Z98.890 S/P ROTATOR CUFF SURGERY: Primary | ICD-10-CM

## 2021-06-17 PROCEDURE — 99213 OFFICE O/P EST LOW 20 MIN: CPT | Performed by: ORTHOPAEDIC SURGERY

## 2021-06-17 NOTE — TELEPHONE ENCOUNTER
Caller: YUKI FUNES     Relationship: SELF     Best call back number:  [phone]    What was the call regarding: PATIENT STATED WC DID NOT RECEIVE PAPERWORK WITH DATES PATIENT WOULD BE OFF WORK- PATIENT IS REQUESTING THOSE TO BE FAXED TO - IF ANY QUESTIONS PLEASE CALL PATIENT BACK     Do you require a callback: YES

## 2021-06-17 NOTE — TELEPHONE ENCOUNTER
CALLED TO GET LEFT SHOULDER MANIPULATION APPROVED. GRICELDA MEDRANO WAS OUT OF THE OFFICE BUT I SPOKE WITH SONAL AND SHE HAS APPROVED THIS PROCEDURE./AW

## 2021-06-17 NOTE — PROGRESS NOTES
"Chief Complaint  Follow-up and Pain of the Left Shoulder    Subjective    History of Present Illness      Nba Gaffney is a 60 y.o. male who presents to [facility] for follow-up on rotator cuff repair.  History of Present Illness this patient had a very significant rotator cuff tear.  He underwent a repair on 15 January 2021.  He is actually done quite well in terms of pain relief.  Unfortunately his range of motion seems to have plateaued out.  He does seem to be forming some scar tissue in the vicinity of the repair.  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  We have discussed that with the patient.  I explained to him the pathology of arthrofibrosis.  He works for [employer] and is clearly unable to go back to work at this point with the shoulder in its current condition.  He has not yet developed full strength back in the shoulder and is at a definite risk of developing a reinjury to this rotator cuff should he go back to work in an unrestricted fashion.  Pain Location: LEFT shoulder  Radiation: up to neck  Quality: aching, sharp, grinding  Intensity/Severity: moderate to severe  Duration: since 09/08/2020  Progression of symptoms: yes, progressive worsening  Onset quality: sudden while at work  Timing: intermittent  Aggravating Factors: overhead reaching, reaching backward  Alleviating Factors: NSAIDs  Previous Episodes: yes  Associated Symptoms: pain, decreased ROM, decreased strength  ADLs Affected: grooming/hygiene/toileting/personal care, recreational activities/sports  Previous Treatment: physical/occupational therapy and prior surgery       Objective   Vital Signs:   Temp 98.7 °F (37.1 °C)   Ht 172.7 cm (68\")   Wt 107 kg (235 lb)   BMI 35.73 kg/m²     Physical Exam  Physical Exam  Vitals signs and nursing note reviewed.   Constitutional:       Appearance: Normal appearance.   Pulmonary:      Effort: Pulmonary " effort is normal.   Skin:     General: Skin is warm and dry.      Capillary Refill: Capillary refill takes less than 2 seconds.   Neurological:      General: No focal deficit present.      Mental Status: He is alert and oriented to person, place, and time. Mental status is at baseline.   Psychiatric:         Mood and Affect: Mood normal.         Behavior: Behavior normal.         Thought Content: Thought content normal.         Judgment: Judgment normal.     Ortho Exam     Left shoulder. The patient is status-post rotator cuff repair 5 month(s) . Incision is clean and healing well. Arthroscopic portals are clean. Appropriate amounts of swelling and bruising are noted. The patients pain is well controlled. The passive range of motion is abduction 0-120 degrees, flexion 0-120 degrees. Axillary nerve function is well preserved. Radial artery pulses are palpable.  He does have some pain and tenderness anteriorly over the biceps tendon.  There is also some pain and spasm in the deltoid muscle insertion on the lateral aspect of the humerus laterally.        Result Review :   The following data was reviewed by: Noah Shaw MD on 06/17/2021:  Radiologic studies - see below for interpretation  no diagnostic testing performed this visit            Procedures           Assessment   Assessment and Plan    Diagnoses and all orders for this visit:    1. S/P rotator cuff surgery (Primary)    2. Traumatic complete tear of left rotator cuff, subsequent encounter          Follow Up   · Extensive discussion with the patient about arthrofibrosis.  · My recommendation is to proceed with manipulation of the shoulder under anesthesia to mechanically break down the scar tissue and then proceed with aggressive physical therapy following that.  I do think this will improve his range of motion and reduce his pain as well.  The patient states that he would like to pursue that option pending and approval from the Workmen[employer]  company.  · Rest, ice, compression, and elevation (RICE) therapy  · Stretching and strengthening exercises of the flexors and abductors of the shoulder.  · Risk and benefits of manipulation of the shoulder discussed with the patient at length.  · OTC Alternate Ibuprofen and Tylenol as needed  · Schedule left shoulder manipulation for Monday 06/21/2021  · Follow up in 4 weeks once the manipulation has been performed.  • Patient was given instructions and counseling regarding his condition or for health maintenance advice. Please see specific information pulled into the AVS if appropriate.     Noah Shaw MD   Date of Encounter: 6/17/2021       EMR Dragon/Transcription disclaimer:  Much of this encounter note is an electronic transcription/translation of spoken language to printed text. The electronic translation of spoken language may permit erroneous, or at times, nonsensical words or phrases to be inadvertently transcribed; Although I have reviewed the note for such errors, some may still exist.

## 2021-06-21 ENCOUNTER — HOSPITAL ENCOUNTER (OUTPATIENT)
Dept: HOSPITAL 49 - FAS | Age: 61
Discharge: HOME | End: 2021-06-21
Attending: ORTHOPAEDIC SURGERY
Payer: COMMERCIAL

## 2021-06-21 ENCOUNTER — OUTSIDE FACILITY SERVICE (OUTPATIENT)
Dept: ORTHOPEDIC SURGERY | Facility: CLINIC | Age: 61
End: 2021-06-21

## 2021-06-21 VITALS — HEIGHT: 67.99 IN | BODY MASS INDEX: 34.86 KG/M2 | WEIGHT: 229.99 LBS

## 2021-06-21 DIAGNOSIS — M75.02: ICD-10-CM

## 2021-06-21 DIAGNOSIS — M24.612: Primary | ICD-10-CM

## 2021-06-21 LAB
HCT: 50.3 % (ref 42–52)
HGB BLD-MCNC: 17.4 G/DL (ref 13.2–18)
MCH RBC QN AUTO: 30.8 PG (ref 25–31)
MCHC RBC AUTO-ENTMCNC: 34.6 G/DL (ref 32–36)
MCV: 89 FL (ref 78–100)
MPV: 9.1 FL (ref 6–9.5)
PLT: 430 K/UL (ref 150–400)
RBC: 5.65 M/UL (ref 4.7–6)
RDW: 14.3 % (ref 11.5–14)
WBC: 12.2 K/UL (ref 4–10.5)

## 2021-06-21 PROCEDURE — 23700 MNPJ ANES SHO JT FIXJ APRATS: CPT | Performed by: ORTHOPAEDIC SURGERY

## 2021-06-25 ENCOUNTER — OFFICE VISIT (OUTPATIENT)
Dept: ORTHOPEDIC SURGERY | Facility: CLINIC | Age: 61
End: 2021-06-25

## 2021-06-25 VITALS — TEMPERATURE: 98 F | HEIGHT: 68 IN | BODY MASS INDEX: 35.61 KG/M2 | WEIGHT: 235 LBS

## 2021-06-25 DIAGNOSIS — M24.612 ARTHROFIBROSIS OF LEFT SHOULDER: Primary | ICD-10-CM

## 2021-06-25 DIAGNOSIS — Z98.890 POST-OPERATIVE STATE: ICD-10-CM

## 2021-06-25 PROCEDURE — 99024 POSTOP FOLLOW-UP VISIT: CPT | Performed by: PHYSICIAN ASSISTANT

## 2021-06-29 PROBLEM — Z98.890 POST-OPERATIVE STATE: Status: ACTIVE | Noted: 2021-06-29

## 2021-06-29 PROBLEM — M24.612 ARTHROFIBROSIS OF LEFT SHOULDER: Status: ACTIVE | Noted: 2021-06-29

## 2021-06-29 NOTE — PROGRESS NOTES
OTHER POST OP GLOBAL     NAME: Nba Gaffney  ?  : 1960  ?  MRN: [id number]    Chief Complaint   Patient presents with   • Left Shoulder - Follow-up, Pain   • Post-op     ?  Date of manipulation: 2021    HPI:   Patient returns today for 4 day follow up of left  shoulder manipulation.  He reports he is doing physical therapy daily with [facility] in [place].  Overall he does report some improvement in range of motion.  His physical therapist would like to continue doing PT daily for 3 to 4 weeks.  Patient reports doing well with no unusual complaints. Appears to be progressing appropriately.      Ortho Exam:   Left shoulder  The shoulder range of motion has improved in forward flexion, abduction and external rotation.   Rotator cuff function well preserved. The scapula is moving freely from the humerus upon abduction.   The range of motion is abduction 85 degrees, forward flexion 110 degrees.   Skin and soft tissues are slightly swollen.   The anterior joint line tenderness has improved.   Axillary nerve function is well preserved. Radial artery pulses are palpable.           Assessment:  Diagnoses and all orders for this visit:    1. Arthrofibrosis of left shoulder (Primary)    2. Post-operative state          Plan   • Continue ice as needed  • Continue with rehab protocol  • Alternate Ibuprofen and Tylenol as needed  • Fall precautions  • Follow up in 4 week(s) with Dr. Shaw     Date of encounter: 2021  Wilson Paniagua PA-C    Electronically signed by Wilson Paniagua PA-C, 21, 12:36 PM EDT.

## 2021-07-22 ENCOUNTER — OFFICE VISIT (OUTPATIENT)
Dept: ORTHOPEDIC SURGERY | Facility: CLINIC | Age: 61
End: 2021-07-22

## 2021-07-22 VITALS — HEIGHT: 68 IN | BODY MASS INDEX: 34.86 KG/M2 | TEMPERATURE: 96.9 F | WEIGHT: 230 LBS

## 2021-07-22 DIAGNOSIS — M24.612 ARTHROFIBROSIS OF LEFT SHOULDER: ICD-10-CM

## 2021-07-22 DIAGNOSIS — Z98.890 S/P ROTATOR CUFF SURGERY: Primary | ICD-10-CM

## 2021-07-22 PROCEDURE — 99213 OFFICE O/P EST LOW 20 MIN: CPT | Performed by: ORTHOPAEDIC SURGERY

## 2021-07-28 PROCEDURE — 93005 ELECTROCARDIOGRAM TRACING: CPT | Performed by: EMERGENCY MEDICINE

## 2021-07-28 PROCEDURE — 93005 ELECTROCARDIOGRAM TRACING: CPT

## 2021-07-28 PROCEDURE — 99283 EMERGENCY DEPT VISIT LOW MDM: CPT

## 2021-07-28 RX ORDER — SODIUM CHLORIDE 0.9 % (FLUSH) 0.9 %
10 SYRINGE (ML) INJECTION AS NEEDED
Status: DISCONTINUED | OUTPATIENT
Start: 2021-07-28 | End: 2021-07-29 | Stop reason: HOSPADM

## 2021-07-28 RX ORDER — ASPIRIN 81 MG/1
324 TABLET, CHEWABLE ORAL ONCE
Status: DISCONTINUED | OUTPATIENT
Start: 2021-07-29 | End: 2021-07-29

## 2021-07-29 ENCOUNTER — HOSPITAL ENCOUNTER (EMERGENCY)
Facility: HOSPITAL | Age: 61
Discharge: ANOTHER HOSPITAL | End: 2021-07-29
Attending: EMERGENCY MEDICINE | Admitting: EMERGENCY MEDICINE

## 2021-07-29 ENCOUNTER — APPOINTMENT (OUTPATIENT)
Dept: GENERAL RADIOLOGY | Facility: HOSPITAL | Age: 61
End: 2021-07-29

## 2021-07-29 ENCOUNTER — HOSPITAL ENCOUNTER (INPATIENT)
Facility: HOSPITAL | Age: 61
LOS: 1 days | Discharge: HOME | End: 2021-07-30
Attending: INTERNAL MEDICINE | Admitting: INTERNAL MEDICINE

## 2021-07-29 ENCOUNTER — APPOINTMENT (OUTPATIENT)
Dept: CARDIOLOGY | Facility: HOSPITAL | Age: 61
End: 2021-07-29

## 2021-07-29 VITALS
HEIGHT: 68 IN | HEART RATE: 80 BPM | TEMPERATURE: 97.3 F | SYSTOLIC BLOOD PRESSURE: 111 MMHG | DIASTOLIC BLOOD PRESSURE: 65 MMHG | BODY MASS INDEX: 34.31 KG/M2 | WEIGHT: 226.41 LBS | RESPIRATION RATE: 18 BRPM | OXYGEN SATURATION: 93 %

## 2021-07-29 DIAGNOSIS — I21.19 AMI INFERIOR WALL (HCC): Primary | ICD-10-CM

## 2021-07-29 DIAGNOSIS — I21.11 ST ELEVATION MYOCARDIAL INFARCTION INVOLVING RIGHT CORONARY ARTERY (HCC): Primary | ICD-10-CM

## 2021-07-29 PROBLEM — I21.9 AMI (ACUTE MYOCARDIAL INFARCTION): Status: ACTIVE | Noted: 2021-07-29

## 2021-07-29 LAB
ACT BLD: 279 SECONDS (ref 82–152)
ALBUMIN SERPL-MCNC: 4.5 G/DL (ref 3.5–5.2)
ALBUMIN/GLOB SERPL: 1.4 G/DL
ALP SERPL-CCNC: 68 U/L (ref 39–117)
ALT SERPL W P-5'-P-CCNC: 29 U/L (ref 1–41)
ANION GAP SERPL CALCULATED.3IONS-SCNC: 10.5 MMOL/L (ref 5–15)
ANION GAP SERPL CALCULATED.3IONS-SCNC: 14.7 MMOL/L (ref 5–15)
AST SERPL-CCNC: 23 U/L (ref 1–40)
BASOPHILS # BLD AUTO: 0.2 10*3/MM3 (ref 0–0.2)
BASOPHILS NFR BLD AUTO: 1.2 % (ref 0–1.5)
BILIRUB SERPL-MCNC: 0.3 MG/DL (ref 0–1.2)
BUN SERPL-MCNC: 11 MG/DL (ref 6–20)
BUN SERPL-MCNC: 15 MG/DL (ref 6–20)
BUN/CREAT SERPL: 14.7 (ref 7–25)
BUN/CREAT SERPL: 15.5 (ref 7–25)
CALCIUM SPEC-SCNC: 8.9 MG/DL (ref 8.6–10.5)
CALCIUM SPEC-SCNC: 9.7 MG/DL (ref 8.6–10.5)
CHLORIDE SERPL-SCNC: 101 MMOL/L (ref 98–107)
CHLORIDE SERPL-SCNC: 106 MMOL/L (ref 98–107)
CHOLEST SERPL-MCNC: 236 MG/DL (ref 0–200)
CK MB SERPL-CCNC: 2.86 NG/ML
CK SERPL-CCNC: 270 U/L (ref 20–200)
CLUMPED PLATELETS: PRESENT
CO2 SERPL-SCNC: 21.5 MMOL/L (ref 22–29)
CO2 SERPL-SCNC: 24.3 MMOL/L (ref 22–29)
CREAT SERPL-MCNC: 0.75 MG/DL (ref 0.76–1.27)
CREAT SERPL-MCNC: 0.97 MG/DL (ref 0.76–1.27)
DEPRECATED RDW RBC AUTO: 42.7 FL (ref 37–54)
DEPRECATED RDW RBC AUTO: 45.4 FL (ref 37–54)
EOSINOPHIL # BLD AUTO: 0.35 10*3/MM3 (ref 0–0.4)
EOSINOPHIL NFR BLD AUTO: 2.1 % (ref 0.3–6.2)
ERYTHROCYTE [DISTWIDTH] IN BLOOD BY AUTOMATED COUNT: 13.5 % (ref 12.3–15.4)
ERYTHROCYTE [DISTWIDTH] IN BLOOD BY AUTOMATED COUNT: 14.2 % (ref 12.3–15.4)
GFR SERPL CREATININE-BSD FRML MDRD: 107 ML/MIN/1.73
GFR SERPL CREATININE-BSD FRML MDRD: 79 ML/MIN/1.73
GLOBULIN UR ELPH-MCNC: 3.3 GM/DL
GLUCOSE BLDC GLUCOMTR-MCNC: 103 MG/DL (ref 70–130)
GLUCOSE BLDC GLUCOMTR-MCNC: 106 MG/DL (ref 70–130)
GLUCOSE BLDC GLUCOMTR-MCNC: 143 MG/DL (ref 70–130)
GLUCOSE BLDC GLUCOMTR-MCNC: 148 MG/DL (ref 70–130)
GLUCOSE BLDC GLUCOMTR-MCNC: 98 MG/DL (ref 70–130)
GLUCOSE SERPL-MCNC: 107 MG/DL (ref 65–99)
GLUCOSE SERPL-MCNC: 158 MG/DL (ref 65–99)
HBA1C MFR BLD: 6.6 % (ref 4.8–5.6)
HCT VFR BLD AUTO: 44.7 % (ref 37.5–51)
HCT VFR BLD AUTO: 49 % (ref 37.5–51)
HDLC SERPL-MCNC: 31 MG/DL (ref 40–60)
HGB BLD-MCNC: 15.4 G/DL (ref 13–17.7)
HGB BLD-MCNC: 16.7 G/DL (ref 13–17.7)
HOLD SPECIMEN: NORMAL
IMM GRANULOCYTES # BLD AUTO: 0.05 10*3/MM3 (ref 0–0.05)
IMM GRANULOCYTES NFR BLD AUTO: 0.3 % (ref 0–0.5)
LDLC SERPL CALC-MCNC: 172 MG/DL (ref 0–100)
LDLC/HDLC SERPL: 5.46 {RATIO}
LIPASE SERPL-CCNC: 31 U/L (ref 13–60)
LYMPHOCYTES # BLD AUTO: 9.63 10*3/MM3 (ref 0.7–3.1)
LYMPHOCYTES NFR BLD AUTO: 57.5 % (ref 19.6–45.3)
MAGNESIUM SERPL-MCNC: 2.1 MG/DL (ref 1.6–2.6)
MCH RBC QN AUTO: 30.2 PG (ref 26.6–33)
MCH RBC QN AUTO: 30.3 PG (ref 26.6–33)
MCHC RBC AUTO-ENTMCNC: 34.1 G/DL (ref 31.5–35.7)
MCHC RBC AUTO-ENTMCNC: 34.5 G/DL (ref 31.5–35.7)
MCV RBC AUTO: 87.8 FL (ref 79–97)
MCV RBC AUTO: 88.6 FL (ref 79–97)
MONOCYTES # BLD AUTO: 1.22 10*3/MM3 (ref 0.1–0.9)
MONOCYTES NFR BLD AUTO: 7.3 % (ref 5–12)
NEUTROPHILS NFR BLD AUTO: 31.6 % (ref 42.7–76)
NEUTROPHILS NFR BLD AUTO: 5.31 10*3/MM3 (ref 1.7–7)
NRBC BLD AUTO-RTO: 0 /100 WBC (ref 0–0.2)
NT-PROBNP SERPL-MCNC: 28.7 PG/ML (ref 0–900)
PLATELET # BLD AUTO: 367 10*3/MM3 (ref 140–450)
PLATELET # BLD AUTO: 374 10*3/MM3 (ref 140–450)
PMV BLD AUTO: 10 FL (ref 6–12)
PMV BLD AUTO: 9.7 FL (ref 6–12)
POTASSIUM SERPL-SCNC: 4.1 MMOL/L (ref 3.5–5.2)
POTASSIUM SERPL-SCNC: 4.3 MMOL/L (ref 3.5–5.2)
PROT SERPL-MCNC: 7.8 G/DL (ref 6–8.5)
RBC # BLD AUTO: 5.09 10*6/MM3 (ref 4.14–5.8)
RBC # BLD AUTO: 5.53 10*6/MM3 (ref 4.14–5.8)
RBC MORPH BLD: NORMAL
SMALL PLATELETS BLD QL SMEAR: ADEQUATE
SODIUM SERPL-SCNC: 138 MMOL/L (ref 136–145)
SODIUM SERPL-SCNC: 140 MMOL/L (ref 136–145)
TRIGL SERPL-MCNC: 178 MG/DL (ref 0–150)
TROPONIN I SERPL-MCNC: 0 NG/ML (ref 0–0.6)
VLDLC SERPL-MCNC: 33 MG/DL (ref 5–40)
WBC # BLD AUTO: 15.98 10*3/MM3 (ref 3.4–10.8)
WBC # BLD AUTO: 16.76 10*3/MM3 (ref 3.4–10.8)
WBC MORPH BLD: NORMAL
WHOLE BLOOD HOLD SPECIMEN: NORMAL

## 2021-07-29 PROCEDURE — 25010000002 MORPHINE PER 10 MG: Performed by: EMERGENCY MEDICINE

## 2021-07-29 PROCEDURE — C1757 CATH, THROMBECTOMY/EMBOLECT: HCPCS | Performed by: INTERNAL MEDICINE

## 2021-07-29 PROCEDURE — 25010000002 HEPARIN (PORCINE) PER 1000 UNITS: Performed by: INTERNAL MEDICINE

## 2021-07-29 PROCEDURE — 92941 PRQ TRLML REVSC TOT OCCL AMI: CPT | Performed by: INTERNAL MEDICINE

## 2021-07-29 PROCEDURE — 85007 BL SMEAR W/DIFF WBC COUNT: CPT | Performed by: EMERGENCY MEDICINE

## 2021-07-29 PROCEDURE — 93005 ELECTROCARDIOGRAM TRACING: CPT | Performed by: INTERNAL MEDICINE

## 2021-07-29 PROCEDURE — C1887 CATHETER, GUIDING: HCPCS | Performed by: INTERNAL MEDICINE

## 2021-07-29 PROCEDURE — 93010 ELECTROCARDIOGRAM REPORT: CPT | Performed by: INTERNAL MEDICINE

## 2021-07-29 PROCEDURE — 71045 X-RAY EXAM CHEST 1 VIEW: CPT

## 2021-07-29 PROCEDURE — C1769 GUIDE WIRE: HCPCS | Performed by: INTERNAL MEDICINE

## 2021-07-29 PROCEDURE — 85025 COMPLETE CBC W/AUTO DIFF WBC: CPT | Performed by: EMERGENCY MEDICINE

## 2021-07-29 PROCEDURE — 25010000002 MIDAZOLAM PER 1 MG: Performed by: INTERNAL MEDICINE

## 2021-07-29 PROCEDURE — C1725 CATH, TRANSLUMIN NON-LASER: HCPCS | Performed by: INTERNAL MEDICINE

## 2021-07-29 PROCEDURE — 4A023N7 MEASUREMENT OF CARDIAC SAMPLING AND PRESSURE, LEFT HEART, PERCUTANEOUS APPROACH: ICD-10-PCS | Performed by: INTERNAL MEDICINE

## 2021-07-29 PROCEDURE — 25010000002 ONDANSETRON PER 1 MG: Performed by: EMERGENCY MEDICINE

## 2021-07-29 PROCEDURE — 82550 ASSAY OF CK (CPK): CPT | Performed by: EMERGENCY MEDICINE

## 2021-07-29 PROCEDURE — 83735 ASSAY OF MAGNESIUM: CPT | Performed by: EMERGENCY MEDICINE

## 2021-07-29 PROCEDURE — 25010000002 FENTANYL CITRATE (PF) 50 MCG/ML SOLUTION: Performed by: INTERNAL MEDICINE

## 2021-07-29 PROCEDURE — 85347 COAGULATION TIME ACTIVATED: CPT

## 2021-07-29 PROCEDURE — 83880 ASSAY OF NATRIURETIC PEPTIDE: CPT | Performed by: EMERGENCY MEDICINE

## 2021-07-29 PROCEDURE — 83690 ASSAY OF LIPASE: CPT | Performed by: EMERGENCY MEDICINE

## 2021-07-29 PROCEDURE — 82553 CREATINE MB FRACTION: CPT | Performed by: EMERGENCY MEDICINE

## 2021-07-29 PROCEDURE — 93458 L HRT ARTERY/VENTRICLE ANGIO: CPT | Performed by: INTERNAL MEDICINE

## 2021-07-29 PROCEDURE — C1894 INTRO/SHEATH, NON-LASER: HCPCS | Performed by: INTERNAL MEDICINE

## 2021-07-29 PROCEDURE — 02C03ZZ EXTIRPATION OF MATTER FROM CORONARY ARTERY, ONE ARTERY, PERCUTANEOUS APPROACH: ICD-10-PCS | Performed by: INTERNAL MEDICINE

## 2021-07-29 PROCEDURE — 25010000002 HEPARIN (PORCINE) PER 1000 UNITS

## 2021-07-29 PROCEDURE — 93306 TTE W/DOPPLER COMPLETE: CPT | Performed by: INTERNAL MEDICINE

## 2021-07-29 PROCEDURE — 25010000002 ATROPINE PER 0.01 MG: Performed by: INTERNAL MEDICINE

## 2021-07-29 PROCEDURE — 96375 TX/PRO/DX INJ NEW DRUG ADDON: CPT

## 2021-07-29 PROCEDURE — 25010000002 HEPARIN (PORCINE) PER 1000 UNITS: Performed by: EMERGENCY MEDICINE

## 2021-07-29 PROCEDURE — B2151ZZ FLUOROSCOPY OF LEFT HEART USING LOW OSMOLAR CONTRAST: ICD-10-PCS | Performed by: INTERNAL MEDICINE

## 2021-07-29 PROCEDURE — B2111ZZ FLUOROSCOPY OF MULTIPLE CORONARY ARTERIES USING LOW OSMOLAR CONTRAST: ICD-10-PCS | Performed by: INTERNAL MEDICINE

## 2021-07-29 PROCEDURE — 80053 COMPREHEN METABOLIC PANEL: CPT | Performed by: EMERGENCY MEDICINE

## 2021-07-29 PROCEDURE — 80061 LIPID PANEL: CPT | Performed by: INTERNAL MEDICINE

## 2021-07-29 PROCEDURE — C1874 STENT, COATED/COV W/DEL SYS: HCPCS | Performed by: INTERNAL MEDICINE

## 2021-07-29 PROCEDURE — 96374 THER/PROPH/DIAG INJ IV PUSH: CPT

## 2021-07-29 PROCEDURE — C9606 PERC D-E COR REVASC W AMI S: HCPCS | Performed by: INTERNAL MEDICINE

## 2021-07-29 PROCEDURE — 82962 GLUCOSE BLOOD TEST: CPT

## 2021-07-29 PROCEDURE — 84484 ASSAY OF TROPONIN QUANT: CPT

## 2021-07-29 PROCEDURE — 85027 COMPLETE CBC AUTOMATED: CPT | Performed by: INTERNAL MEDICINE

## 2021-07-29 PROCEDURE — 0 IOPAMIDOL PER 1 ML: Performed by: INTERNAL MEDICINE

## 2021-07-29 PROCEDURE — 027034Z DILATION OF CORONARY ARTERY, ONE ARTERY WITH DRUG-ELUTING INTRALUMINAL DEVICE, PERCUTANEOUS APPROACH: ICD-10-PCS | Performed by: INTERNAL MEDICINE

## 2021-07-29 PROCEDURE — 80048 BASIC METABOLIC PNL TOTAL CA: CPT | Performed by: INTERNAL MEDICINE

## 2021-07-29 PROCEDURE — 93306 TTE W/DOPPLER COMPLETE: CPT

## 2021-07-29 PROCEDURE — 83036 HEMOGLOBIN GLYCOSYLATED A1C: CPT | Performed by: INTERNAL MEDICINE

## 2021-07-29 DEVICE — XIENCE SIERRA™ EVEROLIMUS ELUTING CORONARY STENT SYSTEM 3.50 MM X 15 MM / RAPID-EXCHANGE
Type: IMPLANTABLE DEVICE | Status: FUNCTIONAL
Brand: XIENCE SIERRA™

## 2021-07-29 RX ORDER — ONDANSETRON 2 MG/ML
INJECTION INTRAMUSCULAR; INTRAVENOUS
Status: COMPLETED | OUTPATIENT
Start: 2021-07-29 | End: 2021-07-29

## 2021-07-29 RX ORDER — FENTANYL CITRATE 50 UG/ML
INJECTION, SOLUTION INTRAMUSCULAR; INTRAVENOUS AS NEEDED
Status: DISCONTINUED | OUTPATIENT
Start: 2021-07-29 | End: 2021-07-29 | Stop reason: HOSPADM

## 2021-07-29 RX ORDER — ATORVASTATIN CALCIUM 40 MG/1
40 TABLET, FILM COATED ORAL ONCE
Status: COMPLETED | OUTPATIENT
Start: 2021-07-29 | End: 2021-07-29

## 2021-07-29 RX ORDER — MIDAZOLAM HYDROCHLORIDE 1 MG/ML
INJECTION INTRAMUSCULAR; INTRAVENOUS AS NEEDED
Status: DISCONTINUED | OUTPATIENT
Start: 2021-07-29 | End: 2021-07-29 | Stop reason: HOSPADM

## 2021-07-29 RX ORDER — ATORVASTATIN CALCIUM 20 MG/1
40 TABLET, FILM COATED ORAL NIGHTLY
Status: DISCONTINUED | OUTPATIENT
Start: 2021-07-29 | End: 2021-07-30 | Stop reason: HOSPADM

## 2021-07-29 RX ORDER — HEPARIN SODIUM 1000 [USP'U]/ML
INJECTION, SOLUTION INTRAVENOUS; SUBCUTANEOUS AS NEEDED
Status: DISCONTINUED | OUTPATIENT
Start: 2021-07-29 | End: 2021-07-29 | Stop reason: HOSPADM

## 2021-07-29 RX ORDER — SODIUM CHLORIDE 9 MG/ML
INJECTION, SOLUTION INTRAVENOUS CONTINUOUS PRN
Status: COMPLETED | OUTPATIENT
Start: 2021-07-29 | End: 2021-07-29

## 2021-07-29 RX ORDER — METOPROLOL SUCCINATE 25 MG/1
25 TABLET, EXTENDED RELEASE ORAL
Status: DISCONTINUED | OUTPATIENT
Start: 2021-07-29 | End: 2021-07-30 | Stop reason: HOSPADM

## 2021-07-29 RX ORDER — MORPHINE SULFATE 4 MG/ML
INJECTION, SOLUTION INTRAMUSCULAR; INTRAVENOUS
Status: COMPLETED | OUTPATIENT
Start: 2021-07-29 | End: 2021-07-29

## 2021-07-29 RX ORDER — ASPIRIN 81 MG/1
81 TABLET ORAL DAILY
Status: DISCONTINUED | OUTPATIENT
Start: 2021-07-29 | End: 2021-07-30 | Stop reason: HOSPADM

## 2021-07-29 RX ORDER — OXYCODONE HYDROCHLORIDE AND ACETAMINOPHEN 5; 325 MG/1; MG/1
1 TABLET ORAL EVERY 4 HOURS PRN
Status: DISCONTINUED | OUTPATIENT
Start: 2021-07-29 | End: 2021-07-30

## 2021-07-29 RX ORDER — NITROGLYCERIN 0.4 MG/1
TABLET SUBLINGUAL
Status: COMPLETED | OUTPATIENT
Start: 2021-07-29 | End: 2021-07-29

## 2021-07-29 RX ORDER — METOPROLOL SUCCINATE 25 MG/1
12.5 TABLET, EXTENDED RELEASE ORAL
Status: DISCONTINUED | OUTPATIENT
Start: 2021-07-29 | End: 2021-07-29

## 2021-07-29 RX ORDER — LIDOCAINE HYDROCHLORIDE 20 MG/ML
INJECTION, SOLUTION INFILTRATION; PERINEURAL AS NEEDED
Status: DISCONTINUED | OUTPATIENT
Start: 2021-07-29 | End: 2021-07-29 | Stop reason: HOSPADM

## 2021-07-29 RX ORDER — PANTOPRAZOLE SODIUM 40 MG/1
40 TABLET, DELAYED RELEASE ORAL
Status: DISCONTINUED | OUTPATIENT
Start: 2021-07-30 | End: 2021-07-30 | Stop reason: HOSPADM

## 2021-07-29 RX ORDER — ASPIRIN 81 MG/1
TABLET, CHEWABLE ORAL
Status: COMPLETED | OUTPATIENT
Start: 2021-07-29 | End: 2021-07-29

## 2021-07-29 RX ORDER — ATROPINE SULFATE 1 MG/ML
INJECTION, SOLUTION INTRAMUSCULAR; INTRAVENOUS; SUBCUTANEOUS AS NEEDED
Status: DISCONTINUED | OUTPATIENT
Start: 2021-07-29 | End: 2021-07-29 | Stop reason: HOSPADM

## 2021-07-29 RX ORDER — HEPARIN SODIUM 5000 [USP'U]/ML
INJECTION, SOLUTION INTRAVENOUS; SUBCUTANEOUS
Status: COMPLETED | OUTPATIENT
Start: 2021-07-29 | End: 2021-07-29

## 2021-07-29 RX ORDER — ONDANSETRON 2 MG/ML
INJECTION INTRAMUSCULAR; INTRAVENOUS
Status: DISCONTINUED
Start: 2021-07-29 | End: 2021-07-29 | Stop reason: HOSPADM

## 2021-07-29 RX ORDER — ACETAMINOPHEN 325 MG/1
650 TABLET ORAL EVERY 4 HOURS PRN
Status: DISCONTINUED | OUTPATIENT
Start: 2021-07-29 | End: 2021-07-30 | Stop reason: HOSPADM

## 2021-07-29 RX ADMIN — NITROGLYCERIN 0.4 MG: 0.4 TABLET, ORALLY DISINTEGRATING SUBLINGUAL at 00:21

## 2021-07-29 RX ADMIN — NITROGLYCERIN 0.4 MG: 0.4 TABLET, ORALLY DISINTEGRATING SUBLINGUAL at 00:27

## 2021-07-29 RX ADMIN — TICAGRELOR 90 MG: 90 TABLET ORAL at 08:47

## 2021-07-29 RX ADMIN — METOPROLOL SUCCINATE 25 MG: 25 TABLET, EXTENDED RELEASE ORAL at 15:03

## 2021-07-29 RX ADMIN — NITROGLYCERIN 0.4 MG: 0.4 TABLET, ORALLY DISINTEGRATING SUBLINGUAL at 00:16

## 2021-07-29 RX ADMIN — ASPIRIN 81 MG 324 MG: 81 TABLET ORAL at 00:12

## 2021-07-29 RX ADMIN — ATORVASTATIN CALCIUM 40 MG: 20 TABLET, FILM COATED ORAL at 20:24

## 2021-07-29 RX ADMIN — HEPARIN SODIUM 5000 UNITS: 5000 INJECTION INTRAVENOUS; SUBCUTANEOUS at 00:15

## 2021-07-29 RX ADMIN — TICAGRELOR 90 MG: 90 TABLET ORAL at 20:24

## 2021-07-29 RX ADMIN — MORPHINE SULFATE 4 MG: 4 INJECTION, SOLUTION INTRAMUSCULAR; INTRAVENOUS at 00:30

## 2021-07-29 RX ADMIN — ATORVASTATIN CALCIUM 40 MG: 40 TABLET, FILM COATED ORAL at 00:12

## 2021-07-29 RX ADMIN — ONDANSETRON 4 MG: 2 INJECTION INTRAMUSCULAR; INTRAVENOUS at 00:29

## 2021-07-30 VITALS
DIASTOLIC BLOOD PRESSURE: 69 MMHG | BODY MASS INDEX: 33.31 KG/M2 | HEART RATE: 68 BPM | RESPIRATION RATE: 18 BRPM | OXYGEN SATURATION: 95 % | SYSTOLIC BLOOD PRESSURE: 93 MMHG | TEMPERATURE: 98.4 F | HEIGHT: 68 IN | WEIGHT: 219.8 LBS

## 2021-07-30 LAB
ALBUMIN SERPL-MCNC: 4 G/DL (ref 3.5–5.2)
ALBUMIN/GLOB SERPL: 1.3 G/DL
ALP SERPL-CCNC: 65 U/L (ref 39–117)
ALT SERPL W P-5'-P-CCNC: 49 U/L (ref 1–41)
ANION GAP SERPL CALCULATED.3IONS-SCNC: 11.5 MMOL/L (ref 5–15)
AORTIC ARCH: 2.5 CM
AORTIC DIMENSIONLESS INDEX: 0.8 (DI)
ASCENDING AORTA: 3.3 CM
AST SERPL-CCNC: 108 U/L (ref 1–40)
BASOPHILS # BLD AUTO: 0.15 10*3/MM3 (ref 0–0.2)
BASOPHILS NFR BLD AUTO: 1.2 % (ref 0–1.5)
BH CV ECHO MEAS - ACS: 2 CM
BH CV ECHO MEAS - AO MAX PG (FULL): 3.6 MMHG
BH CV ECHO MEAS - AO MAX PG: 7.8 MMHG
BH CV ECHO MEAS - AO MEAN PG (FULL): 2 MMHG
BH CV ECHO MEAS - AO MEAN PG: 5 MMHG
BH CV ECHO MEAS - AO ROOT AREA (BSA CORRECTED): 1.3
BH CV ECHO MEAS - AO ROOT AREA: 6.6 CM^2
BH CV ECHO MEAS - AO ROOT DIAM: 2.9 CM
BH CV ECHO MEAS - AO V2 MAX: 140 CM/SEC
BH CV ECHO MEAS - AO V2 MEAN: 105 CM/SEC
BH CV ECHO MEAS - AO V2 VTI: 27.9 CM
BH CV ECHO MEAS - ASC AORTA: 3.3 CM
BH CV ECHO MEAS - AVA(I,A): 2.9 CM^2
BH CV ECHO MEAS - AVA(I,D): 2.9 CM^2
BH CV ECHO MEAS - AVA(V,A): 2.8 CM^2
BH CV ECHO MEAS - AVA(V,D): 2.8 CM^2
BH CV ECHO MEAS - BSA(HAYCOCK): 2.3 M^2
BH CV ECHO MEAS - BSA: 2.2 M^2
BH CV ECHO MEAS - BZI_BMI: 35.3 KILOGRAMS/M^2
BH CV ECHO MEAS - BZI_METRIC_HEIGHT: 172.7 CM
BH CV ECHO MEAS - BZI_METRIC_WEIGHT: 105.2 KG
BH CV ECHO MEAS - EDV(CUBED): 216 ML
BH CV ECHO MEAS - EDV(MOD-SP2): 91 ML
BH CV ECHO MEAS - EDV(MOD-SP4): 123 ML
BH CV ECHO MEAS - EDV(TEICH): 180 ML
BH CV ECHO MEAS - EF(CUBED): 65.7 %
BH CV ECHO MEAS - EF(MOD-BP): 45 %
BH CV ECHO MEAS - EF(MOD-SP2): 52.7 %
BH CV ECHO MEAS - EF(MOD-SP4): 49.6 %
BH CV ECHO MEAS - EF(TEICH): 56.3 %
BH CV ECHO MEAS - ESV(CUBED): 74.1 ML
BH CV ECHO MEAS - ESV(MOD-SP2): 43 ML
BH CV ECHO MEAS - ESV(MOD-SP4): 62 ML
BH CV ECHO MEAS - ESV(TEICH): 78.6 ML
BH CV ECHO MEAS - FS: 30 %
BH CV ECHO MEAS - IVS/LVPW: 1.1
BH CV ECHO MEAS - IVSD: 1 CM
BH CV ECHO MEAS - LAT PEAK E' VEL: 7.3 CM/SEC
BH CV ECHO MEAS - LV DIASTOLIC VOL/BSA (35-75): 56.5 ML/M^2
BH CV ECHO MEAS - LV MASS(C)D: 231.1 GRAMS
BH CV ECHO MEAS - LV MASS(C)DI: 106.2 GRAMS/M^2
BH CV ECHO MEAS - LV MAX PG: 4.2 MMHG
BH CV ECHO MEAS - LV MEAN PG: 3 MMHG
BH CV ECHO MEAS - LV SYSTOLIC VOL/BSA (12-30): 28.5 ML/M^2
BH CV ECHO MEAS - LV V1 MAX: 103 CM/SEC
BH CV ECHO MEAS - LV V1 MEAN: 74.4 CM/SEC
BH CV ECHO MEAS - LV V1 VTI: 21.4 CM
BH CV ECHO MEAS - LVIDD: 6 CM
BH CV ECHO MEAS - LVIDS: 4.2 CM
BH CV ECHO MEAS - LVLD AP2: 7.8 CM
BH CV ECHO MEAS - LVLD AP4: 9.1 CM
BH CV ECHO MEAS - LVLS AP2: 6.8 CM
BH CV ECHO MEAS - LVLS AP4: 8 CM
BH CV ECHO MEAS - LVOT AREA (M): 3.8 CM^2
BH CV ECHO MEAS - LVOT AREA: 3.8 CM^2
BH CV ECHO MEAS - LVOT DIAM: 2.2 CM
BH CV ECHO MEAS - LVPWD: 0.9 CM
BH CV ECHO MEAS - MED PEAK E' VEL: 8.2 CM/SEC
BH CV ECHO MEAS - MV A MAX VEL: 82.9 CM/SEC
BH CV ECHO MEAS - MV DEC SLOPE: 263 CM/SEC^2
BH CV ECHO MEAS - MV DEC TIME: 239 SEC
BH CV ECHO MEAS - MV E MAX VEL: 75 CM/SEC
BH CV ECHO MEAS - MV E/A: 0.9
BH CV ECHO MEAS - MV MEAN PG: 2 MMHG
BH CV ECHO MEAS - MV P1/2T MAX VEL: 82.8 CM/SEC
BH CV ECHO MEAS - MV P1/2T: 92.2 MSEC
BH CV ECHO MEAS - MV V2 MEAN: 58.6 CM/SEC
BH CV ECHO MEAS - MV V2 VTI: 25.9 CM
BH CV ECHO MEAS - MVA P1/2T LCG: 2.7 CM^2
BH CV ECHO MEAS - MVA(P1/2T): 2.4 CM^2
BH CV ECHO MEAS - MVA(VTI): 3.1 CM^2
BH CV ECHO MEAS - PA MAX PG: 2.8 MMHG
BH CV ECHO MEAS - PA V2 MAX: 84.2 CM/SEC
BH CV ECHO MEAS - RAP SYSTOLE: 3 MMHG
BH CV ECHO MEAS - RV MEAN PG: 1 MMHG
BH CV ECHO MEAS - RV V1 MEAN: 39.7 CM/SEC
BH CV ECHO MEAS - RV V1 VTI: 12.8 CM
BH CV ECHO MEAS - SI(AO): 84.7 ML/M^2
BH CV ECHO MEAS - SI(CUBED): 65.2 ML/M^2
BH CV ECHO MEAS - SI(LVOT): 37.4 ML/M^2
BH CV ECHO MEAS - SI(MOD-SP2): 22 ML/M^2
BH CV ECHO MEAS - SI(MOD-SP4): 28 ML/M^2
BH CV ECHO MEAS - SI(TEICH): 46.6 ML/M^2
BH CV ECHO MEAS - SUP REN AO DIAM: 2.2 CM
BH CV ECHO MEAS - SV(AO): 184.3 ML
BH CV ECHO MEAS - SV(CUBED): 141.9 ML
BH CV ECHO MEAS - SV(LVOT): 81.3 ML
BH CV ECHO MEAS - SV(MOD-SP2): 48 ML
BH CV ECHO MEAS - SV(MOD-SP4): 61 ML
BH CV ECHO MEAS - SV(TEICH): 101.4 ML
BH CV ECHO MEAS - TAPSE (>1.6): 2 CM
BH CV ECHO MEASUREMENTS AVERAGE E/E' RATIO: 9.68
BH CV XLRA - RV BASE: 4 CM
BH CV XLRA - RV LENGTH: 9 CM
BH CV XLRA - RV MID: 3.1 CM
BH CV XLRA - TDI S': 13.5 CM/SEC
BILIRUB SERPL-MCNC: 0.4 MG/DL (ref 0–1.2)
BUN SERPL-MCNC: 12 MG/DL (ref 6–20)
BUN/CREAT SERPL: 15.2 (ref 7–25)
CALCIUM SPEC-SCNC: 8.9 MG/DL (ref 8.6–10.5)
CHLORIDE SERPL-SCNC: 106 MMOL/L (ref 98–107)
CHOLEST SERPL-MCNC: 251 MG/DL (ref 0–200)
CO2 SERPL-SCNC: 22.5 MMOL/L (ref 22–29)
CREAT SERPL-MCNC: 0.79 MG/DL (ref 0.76–1.27)
DEPRECATED RDW RBC AUTO: 47.1 FL (ref 37–54)
EOSINOPHIL # BLD AUTO: 0.25 10*3/MM3 (ref 0–0.4)
EOSINOPHIL NFR BLD AUTO: 2 % (ref 0.3–6.2)
ERYTHROCYTE [DISTWIDTH] IN BLOOD BY AUTOMATED COUNT: 14.1 % (ref 12.3–15.4)
GFR SERPL CREATININE-BSD FRML MDRD: 101 ML/MIN/1.73
GLOBULIN UR ELPH-MCNC: 3.1 GM/DL
GLUCOSE BLDC GLUCOMTR-MCNC: 111 MG/DL (ref 70–130)
GLUCOSE BLDC GLUCOMTR-MCNC: 112 MG/DL (ref 70–130)
GLUCOSE BLDC GLUCOMTR-MCNC: 123 MG/DL (ref 70–130)
GLUCOSE SERPL-MCNC: 106 MG/DL (ref 65–99)
HCT VFR BLD AUTO: 49.9 % (ref 37.5–51)
HDLC SERPL-MCNC: 29 MG/DL (ref 40–60)
HGB BLD-MCNC: 16.4 G/DL (ref 13–17.7)
IMM GRANULOCYTES # BLD AUTO: 0.04 10*3/MM3 (ref 0–0.05)
IMM GRANULOCYTES NFR BLD AUTO: 0.3 % (ref 0–0.5)
LDLC SERPL CALC-MCNC: 183 MG/DL (ref 0–100)
LDLC/HDLC SERPL: 6.24 {RATIO}
LEFT ATRIUM VOLUME INDEX: 20.2 ML/M2
LYMPHOCYTES # BLD AUTO: 4.84 10*3/MM3 (ref 0.7–3.1)
LYMPHOCYTES NFR BLD AUTO: 38.4 % (ref 19.6–45.3)
MAXIMAL PREDICTED HEART RATE: 162 BPM
MCH RBC QN AUTO: 30 PG (ref 26.6–33)
MCHC RBC AUTO-ENTMCNC: 32.9 G/DL (ref 31.5–35.7)
MCV RBC AUTO: 91.4 FL (ref 79–97)
MONOCYTES # BLD AUTO: 1.1 10*3/MM3 (ref 0.1–0.9)
MONOCYTES NFR BLD AUTO: 8.7 % (ref 5–12)
NEUTROPHILS NFR BLD AUTO: 49.4 % (ref 42.7–76)
NEUTROPHILS NFR BLD AUTO: 6.24 10*3/MM3 (ref 1.7–7)
NRBC BLD AUTO-RTO: 0 /100 WBC (ref 0–0.2)
PLATELET # BLD AUTO: 366 10*3/MM3 (ref 140–450)
PMV BLD AUTO: 9.6 FL (ref 6–12)
POTASSIUM SERPL-SCNC: 4.2 MMOL/L (ref 3.5–5.2)
PROT SERPL-MCNC: 7.1 G/DL (ref 6–8.5)
QT INTERVAL: 410 MS
RBC # BLD AUTO: 5.46 10*6/MM3 (ref 4.14–5.8)
SARS-COV-2 ORF1AB RESP QL NAA+PROBE: NOT DETECTED
SINUS: 2.9 CM
SODIUM SERPL-SCNC: 140 MMOL/L (ref 136–145)
STJ: 2.5 CM
STRESS TARGET HR: 138 BPM
TRIGL SERPL-MCNC: 205 MG/DL (ref 0–150)
TSH SERPL DL<=0.05 MIU/L-ACNC: 3.67 UIU/ML (ref 0.27–4.2)
VLDLC SERPL-MCNC: 39 MG/DL (ref 5–40)
WBC # BLD AUTO: 12.62 10*3/MM3 (ref 3.4–10.8)

## 2021-07-30 PROCEDURE — 99222 1ST HOSP IP/OBS MODERATE 55: CPT | Performed by: INTERNAL MEDICINE

## 2021-07-30 PROCEDURE — 82962 GLUCOSE BLOOD TEST: CPT

## 2021-07-30 PROCEDURE — 84443 ASSAY THYROID STIM HORMONE: CPT | Performed by: HOSPITALIST

## 2021-07-30 PROCEDURE — 85025 COMPLETE CBC W/AUTO DIFF WBC: CPT | Performed by: HOSPITALIST

## 2021-07-30 PROCEDURE — 80053 COMPREHEN METABOLIC PANEL: CPT | Performed by: HOSPITALIST

## 2021-07-30 PROCEDURE — U0004 COV-19 TEST NON-CDC HGH THRU: HCPCS | Performed by: HOSPITALIST

## 2021-07-30 PROCEDURE — 80061 LIPID PANEL: CPT | Performed by: HOSPITALIST

## 2021-07-30 PROCEDURE — 93005 ELECTROCARDIOGRAM TRACING: CPT | Performed by: INTERNAL MEDICINE

## 2021-07-30 RX ORDER — ATORVASTATIN CALCIUM 40 MG/1
40 TABLET, FILM COATED ORAL NIGHTLY
Qty: 30 TABLET | Refills: 12 | Status: SHIPPED | OUTPATIENT
Start: 2021-07-30 | End: 2021-08-31 | Stop reason: SDUPTHER

## 2021-07-30 RX ORDER — ASPIRIN 81 MG/1
81 TABLET ORAL DAILY
Qty: 30 TABLET | Refills: 12 | Status: SHIPPED | OUTPATIENT
Start: 2021-07-31 | End: 2021-08-31 | Stop reason: SDUPTHER

## 2021-07-30 RX ORDER — METOPROLOL SUCCINATE 25 MG/1
25 TABLET, EXTENDED RELEASE ORAL
Qty: 30 TABLET | Refills: 12 | Status: SHIPPED | OUTPATIENT
Start: 2021-07-31 | End: 2021-08-31 | Stop reason: SDUPTHER

## 2021-07-30 RX ORDER — PANTOPRAZOLE SODIUM 40 MG/1
40 TABLET, DELAYED RELEASE ORAL
Qty: 30 TABLET | Refills: 0 | Status: SHIPPED | OUTPATIENT
Start: 2021-07-31 | End: 2022-02-14

## 2021-07-30 RX ADMIN — METOPROLOL SUCCINATE 25 MG: 25 TABLET, EXTENDED RELEASE ORAL at 08:01

## 2021-07-30 RX ADMIN — PANTOPRAZOLE SODIUM 40 MG: 40 TABLET, DELAYED RELEASE ORAL at 06:11

## 2021-07-30 RX ADMIN — TICAGRELOR 90 MG: 90 TABLET ORAL at 08:01

## 2021-07-30 RX ADMIN — ASPIRIN 81 MG: 81 TABLET, COATED ORAL at 08:01

## 2021-07-31 ENCOUNTER — READMISSION MANAGEMENT (OUTPATIENT)
Dept: CALL CENTER | Facility: HOSPITAL | Age: 61
End: 2021-07-31

## 2021-07-31 NOTE — OUTREACH NOTE
Prep Survey      Responses   [facility]thodoxy facility patient discharged from?  [facility]   Is LACE score < 7 ?  No   Emergency Room discharge w/ pulse ox?  No   Eligibility  Readm Mgmt   Discharge diagnosis  Acute MI status post thrombectomy of RCA and angioplasty    Does the patient have one of the following disease processes/diagnoses(primary or secondary)?  Acute MI (STEMI,NSTEMI)   Does the patient have Home health ordered?  No   Is there a DME ordered?  No   Prep survey completed?  Yes          Marina Sheets RN

## 2021-08-01 LAB
QT INTERVAL: 386 MS
QT INTERVAL: 446 MS

## 2021-08-02 NOTE — PAYOR COMM NOTE
"Yuki Gaffney (60 y.o. Male)                       ATTENTION;   DC SUMMARY CASE REF # [id number]        Date of Birth Social Security Number Address Home Phone MRN    1960  [address] [place] [address] [phone] [id number]    Congregation Marital Status          Jew        Admission Date Admission Type Admitting Provider Attending Provider Department, Room/Bed    21 Urgent Yulissa Gonzales MD  [facility] CORON[facility]ARE, [address]/    Discharge Date Discharge Disposition Discharge Destination        2021 Home or Self Care              Attending Provider: (none)   Allergies: No Known Allergies    Isolation: None   Infection: None   Code Status: Prior    Ht: 172.7 cm (68\")   Wt: 99.7 kg (219 lb 12.8 oz)    Admission Cmt: None   Principal Problem: None                Active Insurance as of 2021     Primary Coverage     Payor Plan Insurance Group Employer/Plan Group    [employer]HEM BLUE CROSS ANTHEM BLUE CROSS BLUE SHIELD PPO [id number]     Payor Plan Address Payor Plan Phone Number Payor Plan Fax Number Effective Dates    [address] [phone]  2019 - None Entered    [place] [address]       Subscriber Name Subscriber Birth Date Member ID       YUKI GAFFNEY 1960 [id number]                 Emergency Contacts      (Rel.) Home Phone Work Phone Mobile Phone    ZAIRE GAFFNEY (Spouse) [phone] -- [phone]               Discharge Summary      Nicky Bess APRN at 21 [address] [place] [address] [facility]  Physician Discharge Summary    Patient Identification:  Name: Yuki Gaffney  Age: 60 y.o.  Sex: male  :  1960  MRN: [id number]    Admit date: 2021    Discharge date and time: 2021 at 1503      Admitting Physician: Yulissa Gonzales MD     Discharge Physician:Dr. Yulissa Gonzales  Discharge Diagnoses:   Acute MI status post thrombectomy of RCA and angioplasty "   Hyperlipidemia  Shoulder pain  Mild ischemic cardiomyopathy: EF 45%    Patient Active Problem List   Diagnosis   • Traumatic complete tear of left rotator cuff   • S/P rotator cuff surgery   • Arthrofibrosis of left shoulder   • Post-operative state   • AMI (acute myocardial infarction) (CMS/[facility])       Discharged Condition: stable    Hospital Course:     This is a 60-year-old male, who is new to our service.  He presented to [facility] with ongoing chest pain.  He had to be stat flight to [facility] for intervention.  He underwent a cardiac cath with stent placement to mid RCA and thrombectomy to the mid RCA.  The importance of antiplatelet therapy has been explained.  He is going home on aspirin, statin, beta-blockade and aspirin.   Echo revealed EF 45%, normal LV function.  No ACE due to low blood pressures.  He is to monitor his blood pressures and to call the office with any concerns.  Cardiovascular stable for discharge.  Discharge instructions as below.      Consults:   IP CONSULT TO INTERNAL MEDICINE    Discharge Exam:                    LABS:  Results from last 7 days   Lab Units 07/29/21  0014   CK TOTAL U/L 270*     Results from last 7 days   Lab Units 07/29/21  0014   MAGNESIUM mg/dL 2.1     Results from last 7 days   Lab Units 07/30/21  0356   SODIUM mmol/L 140   POTASSIUM mmol/L 4.2   BUN mg/dL 12   CREATININE mg/dL 0.79   CALCIUM mg/dL 8.9        Results from last 7 days   Lab Units 07/30/21  0356 07/29/21  0509 07/29/21  0014   WBC 10*3/mm3 12.62* 15.98* 16.76*   HEMOGLOBIN g/dL 16.4 15.4 16.7   HEMATOCRIT % 49.9 44.7 49.0   PLATELETS 10*3/mm3 366 367 374         Results from last 7 days   Lab Units 07/30/21  0356   CHOLESTEROL mg/dL 251*   TRIGLYCERIDES mg/dL 205*   HDL CHOL mg/dL 29*   LDL CHOL mg/dL 183*     Disposition:  Home    Discharge Medications:      Discharge Medications      New Medications      Instructions Start Date   atorvastatin 40 MG tablet  Commonly  known as: LIPITOR   40 mg, Oral, Nightly      metoprolol succinate XL 25 MG 24 hr tablet  Commonly known as: TOPROL-XL   25 mg, Oral, Every 24 Hours Scheduled   Start Date: July 31, 2021     pantoprazole 40 MG EC tablet  Commonly known as: PROTONIX   40 mg, Oral, Every Early Morning   Start Date: July 31, 2021     ticagrelor 90 MG tablet tablet  Commonly known as: BRILINTA   90 mg, Oral, 2 Times Daily         Continue These Medications      Instructions Start Date   aspirin 81 MG EC tablet   81 mg, Oral, Daily   Start Date: July 31, 2021            The following medical decision was discussed in detail with Dr. Gonzales    Discharge Home Instructions:   1. Follow-up with Dr. Gonzales on August 16 at 1 PM.  2.  Follow-up with your primary care physician in 1 week.  Please call for an appointment.  3.  Take all medications as prescribed.  4. The importance of taking dual antiplatelets for one year  has been explained, risk of stent thrombosis leading to the acute MI, which carries high morbidity and mortality has been explained. Discontinuation or interruptions of these medications should be under the strict guidance of appropriate health professional.  5.  Please monitor your blood pressure with these met new medications.  Call our office with any concerns.    6. Routine post cardiac catheterization/PCI discharge home care instructions:    1. No submerging procedure site below water for 7-10 days.  2. No lifting objects greater than 1 lbs for 3 days.  3. If groin site used, avoid climbing several flights of stairs or sitting for longer than 2 hours at a time for the next 24 hours.   4. Monitor puncture site for bleeding and/or knots;. If bleeding should occur at the groin site: lie flat, apply pressure and return to the ER. If bleeding should occur at the wrist site, apply pressure and return to the ER.  5.  You may apply a DRY Band-Aid over the puncture site if needed. Do not apply any lotions, salves or  "ointments to site.  6. No driving for 3 days.  7. Return to ER for recurrent symptoms.  8. No smoking.  9. Take all medications as prescribed.    Signed:     7/30/2021  15:03 EDT      EMR Dragon/Transcription:   \"Dictated utilizing Dragon dictation\".       Electronically signed by Nicky Bess APRN at 07/30/21 1507       "

## 2021-08-05 ENCOUNTER — READMISSION MANAGEMENT (OUTPATIENT)
Dept: CALL CENTER | Facility: HOSPITAL | Age: 61
End: 2021-08-05

## 2021-08-05 NOTE — OUTREACH NOTE
AMI Week 1 Survey      Responses   [facility]   Does the patient have one of the following disease processes/diagnoses(primary or secondary)?  Acute MI (STEMI,NSTEMI)   Week 1 attempt successful?  Yes   Call start time  1200   Call end time  1206   Discharge diagnosis  Acute MI status post thrombectomy of RCA and angioplasty    Meds reviewed with patient/caregiver?  Yes   Is the patient having any side effects they believe may be caused by any medication additions or changes?  No   Does the patient have all prescriptions related to this admission filled (includes statins,anticoagulants,HTN meds,anti-arrhythmia meds)  Yes   Is the patient taking all medications as directed (includes completed medication regime)?  Yes   Does the patient have a primary care provider?   Yes   Does the patient have an appointment with their PCP,cardiologist,or clinic within 7 days of discharge?  Yes   Comments regarding PCP  PCP    Has the patient kept scheduled appointments due by today?  Yes   Psychosocial issues?  No   Did the patient receive a copy of their discharge instructions?  Yes   Nursing interventions  Reviewed instructions with patient   What is the patient's perception of their health status since discharge?  Improving   Nursing interventions  Nurse provided patient education   Is the patient/caregiver able to teach back signs and symptoms of when to call for help immediately:  Sudden chest discomfort, Sudden discomfort in arms, back, neck or jaw, Shortness of breath at any time, Sudden sweating or clammy skin, Nausea or vomiting, Dizziness or lightheadedness, Irregular or rapid heart rate   Nursing interventions  Nurse provided patient education   Is the pateint /caregiver able to teach back the importance of cardiac rehab?  Yes   Nursing interventions  Provided education on importance of cardiac rehab   Is the patient/caregiver able to teach back lifestyle changes to help prevent  MIs  Maintaining a healthy weight, Reducing stress, Heart healthy diet, Regular exercise as approved by provider, Quit smoking   Is the patient/caregiver able to teach back ways to prevent a second heart attack:  Take medications   If the patient is a current smoker, are they able to teach back resources for cessation?  Smoking cessation medications   Is the patient/caregiver able to teach back the hierarchy of who to call/visit for symptoms/problems? PCP, Specialist, Home health nurse, Urgent Care, ED, 911  Yes   Week 1 call completed?  Yes          Hayes Buchanan RN

## 2021-08-06 ENCOUNTER — OFFICE VISIT (OUTPATIENT)
Dept: FAMILY MEDICINE CLINIC | Facility: CLINIC | Age: 61
End: 2021-08-06

## 2021-08-06 VITALS
WEIGHT: 228 LBS | RESPIRATION RATE: 16 BRPM | DIASTOLIC BLOOD PRESSURE: 65 MMHG | SYSTOLIC BLOOD PRESSURE: 119 MMHG | HEART RATE: 96 BPM | HEIGHT: 68 IN | TEMPERATURE: 97.6 F | BODY MASS INDEX: 34.56 KG/M2 | OXYGEN SATURATION: 93 %

## 2021-08-06 DIAGNOSIS — Z12.11 SCREENING FOR COLON CANCER: ICD-10-CM

## 2021-08-06 DIAGNOSIS — F17.200 SMOKER: Chronic | ICD-10-CM

## 2021-08-06 DIAGNOSIS — Z95.5 STENTED CORONARY ARTERY: ICD-10-CM

## 2021-08-06 DIAGNOSIS — Z09 HOSPITAL DISCHARGE FOLLOW-UP: ICD-10-CM

## 2021-08-06 DIAGNOSIS — Z98.890 S/P ROTATOR CUFF SURGERY: ICD-10-CM

## 2021-08-06 DIAGNOSIS — K21.9 GASTROESOPHAGEAL REFLUX DISEASE WITHOUT ESOPHAGITIS: ICD-10-CM

## 2021-08-06 DIAGNOSIS — E11.9 TYPE 2 DIABETES MELLITUS WITHOUT COMPLICATION, WITHOUT LONG-TERM CURRENT USE OF INSULIN (HCC): ICD-10-CM

## 2021-08-06 DIAGNOSIS — Z76.89 ENCOUNTER TO ESTABLISH CARE: Primary | ICD-10-CM

## 2021-08-06 DIAGNOSIS — R91.1 PULMONARY NODULE: ICD-10-CM

## 2021-08-06 DIAGNOSIS — I25.2 HISTORY OF ST ELEVATION MYOCARDIAL INFARCTION (STEMI): ICD-10-CM

## 2021-08-06 DIAGNOSIS — I51.9 CARDIAC DISEASE: ICD-10-CM

## 2021-08-06 PROCEDURE — 99214 OFFICE O/P EST MOD 30 MIN: CPT | Performed by: STUDENT IN AN ORGANIZED HEALTH CARE EDUCATION/TRAINING PROGRAM

## 2021-08-06 NOTE — PROGRESS NOTES
"Chief Complaint  Establishing care and hospital discharge follow-up    Subjective         Nba Gaffney is a 60 y.o. male who presents to [facility]  60 years old male with past medical history of recent MI, s/p stents, type 2 diabetes comes to the clinic today to establish care/follow-up on hospital discharge and medication management.    Patient was recently diagnosed with STEMI a week ago and s/p cardiac stent.  Patient is following up with cardiologist, next appointment in 2 weeks.    Patient also had A1c of 6.6; patient is currently taking aspirin/statin/metoprolol/Brilinta.    Patient denies any chest pain or shortness of breath since discharge.    Review of Systems   Objective   Vital Signs:   Vitals:    08/06/21 1539   BP: 119/65   Pulse: 96   Resp: 16   Temp: 97.6 °F (36.4 °C)   TempSrc: Infrared   SpO2: 93%   Weight: 103 kg (228 lb)   Height: 172.7 cm (68\")      Body mass index is 34.67 kg/m².   Physical Exam  Vitals reviewed.   Constitutional:       Appearance: Normal appearance. He is well-developed.   HENT:      Head: Normocephalic and atraumatic.      Right Ear: External ear normal.      Left Ear: External ear normal.      Mouth/Throat:      Pharynx: No oropharyngeal exudate.   Eyes:      Conjunctiva/sclera: Conjunctivae normal.      Pupils: Pupils are equal, round, and reactive to light.   Cardiovascular:      Rate and Rhythm: Normal rate and regular rhythm.      Heart sounds: No murmur heard.   No friction rub. No gallop.    Pulmonary:      Effort: Pulmonary effort is normal.      Breath sounds: Normal breath sounds. No wheezing or rhonchi.   Abdominal:      General: Bowel sounds are normal. There is no distension.      Palpations: Abdomen is soft.      Tenderness: There is no abdominal tenderness.   Skin:     General: Skin is warm and dry.   Neurological:      Mental Status: He is alert and oriented to person, place, and time.      Cranial Nerves: No cranial " nerve deficit.   Psychiatric:         Mood and Affect: Mood and affect normal.         Behavior: Behavior normal.         Thought Content: Thought content normal.         Judgment: Judgment normal.                 Assessment and Plan   Diagnoses and all orders for this visit:    1. Encounter to establish care (Primary)    2. Hospital discharge follow-up  Comments:  Patient was hospitalized for STEMI; status post cardiac cath/stents.  No ACE inhibitor due to low blood pressure.  Continue with aspirin/statin/beta-blocker and    3. Cardiac disease    4. History of ST elevation myocardial infarction (STEMI)    5. Stented coronary artery  Comments:  On 7/2021; continue follow-up with cardiologist    6. Type 2 diabetes mellitus without complication, without long-term current use of insulin (CMS/[facility])  Comments:  A1c 6.6 on 7/2021; we will start Metformin 500 mg from next week.  Repeat the blood work within 2 months or as per cardiologist  Orders:  -     Discontinue: metFORMIN (Glucophage) 500 MG tablet; Take 1 tablet by mouth 2 (Two) Times a Day With Meals.  Dispense: 60 tablet; Refill: 2  -     metFORMIN (Glucophage) 500 MG tablet; Take 1 tablet by mouth 2 (Two) Times a Day With Meals.  Dispense: 60 tablet; Refill: 2    7. S/P rotator cuff surgery    8. Screening for colon cancer  -     Cologuard - Stool, Per Rectum; Future    9. Gastroesophageal reflux disease without esophagitis  Comments:  Controlled on pantoprazole    10. Smoker  Comments:  2 pk/day for > 30 yrs.  Smoking cessation discussed  Orders:  -      CT Chest Low Dose Cancer Screening WO; Future            Follow Up   Return in about 2 months (around 10/6/2021) for Recheck.  Patient was given instructions and counseling regarding his condition or for health maintenance advice. Please see specific information pulled into the AVS if appropriate.

## 2021-08-16 ENCOUNTER — OFFICE VISIT (OUTPATIENT)
Dept: CARDIOLOGY | Facility: CLINIC | Age: 61
End: 2021-08-16

## 2021-08-16 VITALS
HEIGHT: 68 IN | BODY MASS INDEX: 34.4 KG/M2 | HEART RATE: 87 BPM | SYSTOLIC BLOOD PRESSURE: 150 MMHG | DIASTOLIC BLOOD PRESSURE: 80 MMHG | WEIGHT: 227 LBS

## 2021-08-16 DIAGNOSIS — I25.10 CORONARY ARTERY DISEASE INVOLVING NATIVE CORONARY ARTERY OF NATIVE HEART WITHOUT ANGINA PECTORIS: Primary | ICD-10-CM

## 2021-08-16 DIAGNOSIS — Z72.0 TOBACCO USE: ICD-10-CM

## 2021-08-16 PROCEDURE — 99213 OFFICE O/P EST LOW 20 MIN: CPT | Performed by: INTERNAL MEDICINE

## 2021-08-16 NOTE — PROGRESS NOTES
"CATH FOLLOW UP   Subjective:        Nba Gaffney is a 60 y.o. male who here for follow up    CC  POST MI  HPI  60-year-old male with known history of coronary artery disease with recent myocardial infarction diabetes hyperlipidemia here for the follow-up denies any chest pains or tightness in the chest     Problems Addressed this Visit        Cardiac and Vasculature    Coronary artery disease involving native coronary artery of native heart without angina pectoris - Primary       Tobacco    Tobacco use      Diagnoses       Codes Comments    Coronary artery disease involving native coronary artery of native heart without angina pectoris    -  Primary ICD-10-CM: I25.10  ICD-9-CM: 414.01     Tobacco use     ICD-10-CM: Z72.0  ICD-9-CM: 305.1         .    The following portions of the patient's history were reviewed and updated as appropriate: allergies, current medications, past family history, past medical history, past social history, past surgical history and problem list.    Past Medical History:   Diagnosis Date   • Environmental allergies    • Incisional hernia 2008   • Lumbago     LOW BACK PAIN   • Lumbar disc herniation 03/17/2014    L4-5   • Lumbar spinal stenosis 03/17/2014   • Shoulder pain, left      reports that he has been smoking cigarettes. He has a 23.00 pack-year smoking history. He has never used smokeless tobacco. He reports previous alcohol use. He reports previous drug use.   Family History   Problem Relation Age of Onset   • Malig Hyperthermia Neg Hx        Review of Systems  Constitutional: No wt loss, fever, fatigue  Gastrointestinal: No nausea, abdominal pain  Behavioral/Psych: No insomnia or anxiety   Cardiovascular no chest pains or tightness in the chest  Objective:       Physical Exam  /80   Pulse 87   Ht 172.7 cm (68\")   Wt 103 kg (227 lb)   BMI 34.52 kg/m²   General appearance: No acute changes   Neck: Trachea midline; NECK, supple, no thyromegaly or lymphadenopathy   Lungs: " Normal size and shape, normal breath sounds, equal distribution of air, no rales and rhonchi   CV: S1-S2 regular, no murmurs, no rub, no gallop   Abdomen: Soft, non-tender; no masses , no abnormal abdominal sounds   Extremities: No deformity , normal color , no peripheral edema   Skin: Normal temperature, turgor and texture; no rash, ulcers          Procedures      Echocardiogram:        Current Outpatient Medications:   •  aspirin (aspirin) 81 MG EC tablet, Take 1 tablet by mouth Daily., Disp: 30 tablet, Rfl: 12  •  atorvastatin (LIPITOR) 40 MG tablet, Take 1 tablet by mouth Every Night., Disp: 30 tablet, Rfl: 12  •  metFORMIN (Glucophage) 500 MG tablet, Take 1 tablet by mouth 2 (Two) Times a Day With Meals., Disp: 60 tablet, Rfl: 2  •  metoprolol succinate XL (TOPROL-XL) 25 MG 24 hr tablet, Take 1 tablet by mouth Daily., Disp: 30 tablet, Rfl: 12  •  pantoprazole (PROTONIX) 40 MG EC tablet, Take 1 tablet by mouth Every Morning., Disp: 30 tablet, Rfl: 0  •  ticagrelor (BRILINTA) 90 MG tablet tablet, Take 1 tablet by mouth 2 (Two) Times a Day., Disp: 60 tablet, Rfl: 12   Assessment:        Patient Active Problem List   Diagnosis   • Traumatic complete tear of left rotator cuff   • S/P rotator cuff surgery   • Arthrofibrosis of left shoulder   • Post-operative state   • AMI (acute myocardial infarction) (CMS/[facility])               Plan:            ICD-10-CM ICD-9-CM   1. Coronary artery disease involving native coronary artery of native heart without angina pectoris  I25.10 414.01   2. Tobacco use  Z72.0 305.1     1. Coronary artery disease involving native coronary artery of native heart without angina pectoris  No angina pectoris    2. Tobacco use  Counseling has been done  ETT BEFORE GOING TO WORK  [facility] PER PCP  6 MONTHS  WITH ECHO  COUNSELING:    Nba Marie was given to patient for the following topics: diagnostic results, risk factor reductions, impressions, risks and benefits of treatment options  and importance of treatment compliance .       SMOKING COUNSELING:    [unfilled]    Dictated using Dragon dictation

## 2021-08-17 ENCOUNTER — HOSPITAL ENCOUNTER (OUTPATIENT)
Dept: CT IMAGING | Facility: HOSPITAL | Age: 61
Discharge: HOME | End: 2021-08-17
Admitting: STUDENT IN AN ORGANIZED HEALTH CARE EDUCATION/TRAINING PROGRAM

## 2021-08-17 DIAGNOSIS — F17.200 SMOKER: Chronic | ICD-10-CM

## 2021-08-17 PROCEDURE — 71271 CT THORAX LUNG CANCER SCR C-: CPT

## 2021-08-27 ENCOUNTER — HOSPITAL ENCOUNTER (OUTPATIENT)
Dept: CARDIOLOGY | Facility: HOSPITAL | Age: 61
Discharge: HOME | End: 2021-08-27
Admitting: INTERNAL MEDICINE

## 2021-08-27 VITALS — BODY MASS INDEX: 34.56 KG/M2 | WEIGHT: 228 LBS | HEIGHT: 68 IN

## 2021-08-27 PROCEDURE — 93017 CV STRESS TEST TRACING ONLY: CPT

## 2021-08-30 ENCOUNTER — TELEPHONE (OUTPATIENT)
Dept: CARDIOLOGY | Facility: CLINIC | Age: 61
End: 2021-08-30

## 2021-08-30 NOTE — TELEPHONE ENCOUNTER
----- Message from Hailey Isbell sent at 8/30/2021  2:07 PM EDT -----  Regarding: TMT RESULTS  Contact: [phone]  HAD TREADMILL DONE AT [employer] AND WANTS TO KNOW THE RESULTS SO HE CAN RETURN TO WORK

## 2021-08-31 LAB
BH CV IMMEDIATE POST RECOVERY TECH DATA SYMPTOMS: NORMAL
BH CV IMMEDIATE POST TECH DATA BLOOD PRESSURE: NORMAL MMHG
BH CV IMMEDIATE POST TECH DATA HEART RATE: 128 BPM
BH CV NINE MINUTE RECOVERY TECH DATA BLOOD PRESSURE: NORMAL MMHG
BH CV NINE MINUTE RECOVERY TECH DATA HEART RATE: 108 BPM
BH CV NINE MINUTE RECOVERY TECH DATA SYMPTOMS: NORMAL
BH CV SIX MINUTE RECOVERY TECH DATA BLOOD PRESSURE: NORMAL
BH CV SIX MINUTE RECOVERY TECH DATA HEART RATE: 105 BPM
BH CV SIX MINUTE RECOVERY TECH DATA SYMPTOMS: NORMAL
BH CV STRESS BP STAGE 1: NORMAL
BH CV STRESS BP STAGE 2: NORMAL
BH CV STRESS DURATION MIN STAGE 1: 3
BH CV STRESS DURATION MIN STAGE 2: 3
BH CV STRESS DURATION SEC STAGE 1: 0
BH CV STRESS DURATION SEC STAGE 2: 0
BH CV STRESS GRADE STAGE 1: 10
BH CV STRESS GRADE STAGE 2: 12
BH CV STRESS HR STAGE 1: 125
BH CV STRESS HR STAGE 2: 145
BH CV STRESS METS STAGE 1: 5
BH CV STRESS METS STAGE 2: 7.5
BH CV STRESS O2 STAGE 2: 96
BH CV STRESS PROTOCOL 1: NORMAL
BH CV STRESS SPEED STAGE 1: 1.7
BH CV STRESS SPEED STAGE 2: 2.5
BH CV STRESS STAGE 1: 1
BH CV STRESS STAGE 2: 2
BH CV THREE MINUTE POST TECH DATA BLOOD PRESSURE: NORMAL MMHG
BH CV THREE MINUTE POST TECH DATA HEART RATE: 108 BPM
MAXIMAL PREDICTED HEART RATE: 162 BPM
PERCENT MAX PREDICTED HR: 89.51 %
STRESS BASELINE BP: NORMAL MMHG
STRESS BASELINE HR: 81 BPM
STRESS PERCENT HR: 105 %
STRESS POST ESTIMATED WORKLOAD: 6.3 METS
STRESS POST EXERCISE DUR MIN: 6 MIN
STRESS POST EXERCISE DUR SEC: 0 SEC
STRESS POST O2 SAT PEAK: 96 %
STRESS POST PEAK BP: NORMAL MMHG
STRESS POST PEAK HR: 145 BPM
STRESS TARGET HR: 138 BPM

## 2021-09-02 ENCOUNTER — OFFICE VISIT (OUTPATIENT)
Dept: ORTHOPEDIC SURGERY | Facility: CLINIC | Age: 61
End: 2021-09-02

## 2021-09-02 ENCOUNTER — TELEPHONE (OUTPATIENT)
Dept: ORTHOPEDIC SURGERY | Facility: CLINIC | Age: 61
End: 2021-09-02

## 2021-09-02 VITALS — BODY MASS INDEX: 34.56 KG/M2 | TEMPERATURE: 98 F | WEIGHT: 228 LBS | HEIGHT: 68 IN

## 2021-09-02 DIAGNOSIS — Z98.890 S/P ROTATOR CUFF SURGERY: Primary | ICD-10-CM

## 2021-09-02 PROCEDURE — 99213 OFFICE O/P EST LOW 20 MIN: CPT | Performed by: ORTHOPAEDIC SURGERY

## 2021-09-02 NOTE — TELEPHONE ENCOUNTER
Caller: ANIBAL WITH [employer]    Relationship: PATIENT'S HR EMPLOYER    Best call back number: [phone]     What is the best time to reach you: FROM 7:30AM TO 4:30 PM TODAY (09/02/21), AND TOMORROW 7:30AM TO 3:30PM (09/03/21)    Who are you requesting to speak with (clinical staff, provider,  specific staff member): ANY STAFF MEMBER    What was the call regarding: A RETURN TO WORK LETTER. EMPLOYER WANTING TO KNOW IF PATIENT HAS ANY WORK RESTRICTIONS.    Do you require a callback: YES, PLEASE LEAVE VM IF NO ANSWER

## 2021-09-02 NOTE — PROGRESS NOTES
"Chief Complaint  Follow-up and Pain of the Left Shoulder    Subjective    History of Present Illness      Nba Gaffney is a 60 y.o. male who presents to [facility] for follow-up on left shoulder injury with a rotator cuff repair.  History of Present Illness the patient has made excellent progress over the past few weeks with physical therapy.  He did develop arthrofibrosis initially but has overcome that with a combination of manipulation which was performed by me on 21 June 2021.  The patient states that he does not have any significant pain or discomfort.  He has a mild popping sensation in mid abduction but that is not symptomatic for the patient.  He states that he recently suffered with an MI and had 2 stents placed.  He states that he would like to go on back to work on 6 September 2021.  Pain Location: LEFT shoulder  Radiation: none  Quality: dull, aching  Intensity/Severity: mild   Duration: since 09/08/2020  Progression of symptoms: no worsening, symptoms stable/unchanged  Onset quality: sudden  Timing: intermittent  Aggravating Factors: overhead reaching, rotating motion, repetitive motion  Alleviating Factors: NSAIDs, rest, ice  Previous Episodes: yes  Associated Symptoms: pain  ADLs Affected: work related activities, recreational activities/sports  Previous Treatment: physical/occupational therapy and prior surgery       Objective   Vital Signs:   Temp 98 °F (36.7 °C)   Ht 172.7 cm (67.99\")   Wt 103 kg (228 lb)   BMI 34.68 kg/m²     Physical Exam  Physical Exam  Vitals signs and nursing note reviewed.   Constitutional:       Appearance: Normal appearance.   Pulmonary:      Effort: Pulmonary effort is normal.   Skin:     General: Skin is warm and dry.      Capillary Refill: Capillary refill takes less than 2 seconds.   Neurological:      General: No focal deficit present.      Mental Status: He is alert and oriented to person, place, and time. Mental status is at " baseline.   Psychiatric:         Mood and Affect: Mood normal.         Behavior: Behavior normal.         Thought Content: Thought content normal.         Judgment: Judgment normal.     Ortho Exam   Left shoulder. The patient is status-post rotator cuff repair 8 month(s) . Incision is clean and healing well. Arthroscopic portals are clean. Appropriate amounts of swelling and bruising are noted. The patients pain is well controlled. The passive range of motion is abduction 0-125 degrees, flexion 0-125 degrees. Axillary nerve function is well preserved. Radial artery pulses are palpable.  Sulcus sign is negative.  Apprehension sign is negative.        Result Review :   The following data was reviewed by: Noah Shaw MD on 09/02/2021:  Radiologic studies - see below for interpretation  no diagnostic testing performed this visit            Procedures           Assessment   Assessment and Plan    Diagnoses and all orders for this visit:    1. S/P rotator cuff surgery (Primary)          Follow Up   · Continue with aggressive physical therapy to the shoulder with stretching and strengthening exercises specifically focused on the rotator cuff rehabilitation.  · Calcium and vitamin D for bone health.  · Falls precautions discussed with the patient at length.  · Reinjury precautions discussed with the patient as well.  · Discussed with him about going back to work in an unrestricted unlimited duty fashion but with precautions.  · Continue physical therapy   · Rest, ice, compression, and elevation (RICE) therapy  · Stretching and strengthening exercises of the flexors and abductor the shoulder.  · Tablet meloxicam 7.5 mg tab 1 p.o. nightly for pain swelling and discomfort.  · Return to work on 09/06/2021  · OTC Alternate Ibuprofen and Tylenol as needed  · Follow up in 3 month(s)  • Patient was given instructions and counseling regarding his condition or for health maintenance advice. Please see specific information pulled  into the AVS if appropriate.     Noah Shaw MD   Date of Encounter: 9/2/2021   Electronically signed by Rhoda Albarado MA, 09/02/21, 9:04 AM EDT.     EMR Dragon/Transcription disclaimer:  Much of this encounter note is an electronic transcription/translation of spoken language to printed text. The electronic translation of spoken language may permit erroneous, or at times, nonsensical words or phrases to be inadvertently transcribed; Although I have reviewed the note for such errors, some may still exist.

## 2021-09-02 NOTE — TELEPHONE ENCOUNTER
Caller: JESSICA    Relationship to patient: [employer]     Best call back number: [phone]    Patient is needing: JESSICA WITH [employer] WAS CALLING TO REQUEST THE WORK NOTE JESE JUST FAXED OVER TO INCLUDE RESTRICTIONS IF ANY. JESSICA STATED THE VOICEMAIL STATED NO RESTRICITIONS BUT THEY NEEDED THE LETTER TO SAY NO RESTRICTIONS IF THAT IS THE CASE. THE FAX # IS  [phone].

## 2021-09-08 ENCOUNTER — TELEPHONE (OUTPATIENT)
Dept: FAMILY MEDICINE CLINIC | Facility: CLINIC | Age: 61
End: 2021-09-08

## 2021-09-08 DIAGNOSIS — R19.5 POSITIVE COLORECTAL CANCER SCREENING USING COLOGUARD TEST: ICD-10-CM

## 2021-09-08 DIAGNOSIS — Z12.11 SCREENING FOR COLON CANCER: Primary | ICD-10-CM

## 2021-09-08 NOTE — TELEPHONE ENCOUNTER
----- Message from Eugene Campos MD sent at 9/3/2021  5:40 PM EDT -----  Please inform patient that your Cologuard is positive means you will need to have colonoscopy for further evaluation.  If patient agrees; please order colonoscopy for abnormal Cologuard/colon cancer screening.

## 2021-09-14 ENCOUNTER — PREP FOR SURGERY (OUTPATIENT)
Dept: OTHER | Facility: HOSPITAL | Age: 61
End: 2021-09-14

## 2021-09-14 ENCOUNTER — TELEPHONE (OUTPATIENT)
Dept: GASTROENTEROLOGY | Facility: CLINIC | Age: 61
End: 2021-09-14

## 2021-09-14 ENCOUNTER — CLINICAL SUPPORT (OUTPATIENT)
Dept: GASTROENTEROLOGY | Facility: CLINIC | Age: 61
End: 2021-09-14

## 2021-09-14 DIAGNOSIS — R19.5 POSITIVE COLORECTAL CANCER SCREENING USING COLOGUARD TEST: Primary | ICD-10-CM

## 2021-09-14 DIAGNOSIS — Z12.11 SCREENING FOR COLON CANCER: ICD-10-CM

## 2021-09-14 RX ORDER — SODIUM, POTASSIUM,MAG SULFATES 17.5-3.13G
2 SOLUTION, RECONSTITUTED, ORAL ORAL EVERY 12 HOURS
Qty: 177 ML | Refills: 0 | Status: SHIPPED | OUTPATIENT
Start: 2021-09-14 | End: 2022-09-09 | Stop reason: SDUPTHER

## 2021-09-14 NOTE — TELEPHONE ENCOUNTER
Nba Gaffnye  REASON FOR CALL encounter for colon screening/+ cologuard  SENT IN [facility]  Past Medical History:   Diagnosis Date   • Environmental allergies    • Hyperlipidemia    • Hypertension    • Incisional hernia 2008   • Lumbago     LOW BACK PAIN   • Lumbar disc herniation 03/17/2014    L4-5   • Lumbar spinal stenosis 03/17/2014   • Shoulder pain, left      No Known Allergies  Past Surgical History:   Procedure Laterality Date   • CARDIAC CATHETERIZATION N/A 7/29/2021    Procedure: Left Heart Cath;  Surgeon: Yulissa Gonzales MD;  Location:  [facility];  Service: Cardiovascular;  Laterality: N/A;   • CARDIAC CATHETERIZATION N/A 7/29/2021    Procedure: Coronary angiography;  Surgeon: Yulissa Gonzales MD;  Location:  [facility];  Service: Cardiovascular;  Laterality: N/A;   • CARDIAC CATHETERIZATION N/A 7/29/2021    Procedure: Stent KAYY coronary;  Surgeon: Yulissa Gonzales MD;  Location:  [facility];  Service: Cardiovascular;  Laterality: N/A;   • CARDIAC CATHETERIZATION N/A 7/29/2021    Procedure: Left ventriculography;  Surgeon: Yulissa Gonzales MD;  Location:  [facility];  Service: Cardiovascular;  Laterality: N/A;   • CARDIAC CATHETERIZATION  7/29/2021    Procedure: Percutaneous Manual Thrombectomy - Bonham;  Surgeon: Yulissa Gonzales MD;  Location:  [facility];  Service: Cardiovascular;;   • ELBOW PROCEDURE Right    • FEMUR FRACTURE SURGERY     • HIP SURGERY Left    • LEG SURGERY     • PERIPHERAL ARTERIAL STENT GRAFT     • SHOULDER ARTHROSCOPY W/ ROTATOR CUFF REPAIR Left 1/15/2021    Procedure: SHOULDER ARTHROSCOPY WITH ROTATOR CUFF REPAIR;  Surgeon: Noah Shaw MD;  Location:  [facility] [place] [facility];  Service: Orthopedics;  Laterality: Left;   • SHOULDER SURGERY Right    • SPLENECTOMY     • WRIST SURGERY Right     wrist orif     Social History     Socioeconomic History   • Marital status:       Spouse name: Not on file   • Number of children: Not on file   • Years of education: Not on file   • Highest education level: Not on file   Tobacco Use   • Smoking status: Current Every Day Smoker     Packs/day: 0.50     Years: 46.00     Pack years: 23.00     Types: Cigarettes   • Smokeless tobacco: Never Used   Vaping Use   • Vaping Use: Never used   Substance and Sexual Activity   • Alcohol use: Not Currently   • Drug use: Not Currently     Family History   Problem Relation Age of Onset   • Malig Hyperthermia Neg Hx        Current Outpatient Medications:   •  aspirin 81 MG EC tablet, Take 1 tablet by mouth Daily., Disp: 30 tablet, Rfl: 11  •  atorvastatin (LIPITOR) 40 MG tablet, Take 1 tablet by mouth Every Night., Disp: 30 tablet, Rfl: 11  •  metFORMIN (Glucophage) 500 MG tablet, Take 1 tablet by mouth 2 (Two) Times a Day With Meals., Disp: 60 tablet, Rfl: 2  •  metoprolol succinate XL (TOPROL-XL) 25 MG 24 hr tablet, Take 1 tablet by mouth Daily., Disp: 30 tablet, Rfl: 11  •  pantoprazole (PROTONIX) 40 MG EC tablet, Take 1 tablet by mouth Every Morning., Disp: 30 tablet, Rfl: 0  •  ticagrelor (Brilinta) 90 MG tablet tablet, Take 1 tablet by mouth 2 (two) times a day., Disp: 60 tablet, Rfl: 11

## 2021-09-14 NOTE — PROGRESS NOTES
SPOKE WITH PT ON A DATE FOR COLONOSCOPY OF TBD . MADE SURE CHART WAS UP TO DATE. WENT OVER PREP AND MAILED OUT INSTRUCTIONS. PUT IN ORDER FOR COLON AND SENT PREP TO PHARMACY

## 2021-10-22 ENCOUNTER — OFFICE VISIT (OUTPATIENT)
Dept: FAMILY MEDICINE CLINIC | Facility: CLINIC | Age: 61
End: 2021-10-22

## 2021-10-22 VITALS
OXYGEN SATURATION: 96 % | TEMPERATURE: 97.1 F | DIASTOLIC BLOOD PRESSURE: 76 MMHG | SYSTOLIC BLOOD PRESSURE: 130 MMHG | WEIGHT: 226 LBS | HEIGHT: 68 IN | HEART RATE: 80 BPM | BODY MASS INDEX: 34.25 KG/M2 | RESPIRATION RATE: 18 BRPM

## 2021-10-22 DIAGNOSIS — Z95.5 STENTED CORONARY ARTERY: ICD-10-CM

## 2021-10-22 DIAGNOSIS — K21.9 GASTROESOPHAGEAL REFLUX DISEASE WITHOUT ESOPHAGITIS: ICD-10-CM

## 2021-10-22 DIAGNOSIS — E11.9 TYPE 2 DIABETES MELLITUS WITHOUT COMPLICATION, WITHOUT LONG-TERM CURRENT USE OF INSULIN (HCC): Primary | ICD-10-CM

## 2021-10-22 DIAGNOSIS — R91.1 PULMONARY NODULE: ICD-10-CM

## 2021-10-22 DIAGNOSIS — F17.200 SMOKER: ICD-10-CM

## 2021-10-22 DIAGNOSIS — R19.5 POSITIVE COLORECTAL CANCER SCREENING USING COLOGUARD TEST: ICD-10-CM

## 2021-10-22 DIAGNOSIS — I25.2 HISTORY OF ST ELEVATION MYOCARDIAL INFARCTION (STEMI): ICD-10-CM

## 2021-10-22 PROCEDURE — 99214 OFFICE O/P EST MOD 30 MIN: CPT | Performed by: STUDENT IN AN ORGANIZED HEALTH CARE EDUCATION/TRAINING PROGRAM

## 2021-10-22 NOTE — PROGRESS NOTES
"Chief Complaint  Follow-up on diabetes/pulmonary nodule/positive Cologuard and cardiac disease    Subjective         Nba Gaffney is a 61 y.o. male who presents to [facility]  61 years old male with past medical history of recently diagnosed type 2 diabetes, stented coronary artery, cardiac disease, pulmonary nodule, current smoker, positive Cologuard comes to the clinic today to follow-up on chronic conditions and medication management.    Patient is following up with cardiologist, taking aspirin/Plavix/beta-blocker  Patient was referred to the GI for colonoscopy for positive Cologuard; pending colonoscopy likely due to dual antiplatelets use s/p stented coronary artery.    Type 2 diabetes; patient was started on Metformin but reports he would like to start with diet and exercise first.  We will recheck the blood work and decide if necessary to start Metformin or not.    Smoking; cutting down    Pulmonary nodule; found on recent CT, repeated in 1 year.    Denies any other chest pain or shortness of breath on exertion.    Review of Systems   Objective   Vital Signs:   Vitals:    10/22/21 0742   BP: 130/76   Pulse: 80   Resp: 18   Temp: 97.1 °F (36.2 °C)   SpO2: 96%   Weight: 103 kg (226 lb)   Height: 172.7 cm (67.99\")      Body mass index is 34.37 kg/m².   Physical Exam  Vitals reviewed.   Constitutional:       Appearance: Normal appearance. He is well-developed.   HENT:      Head: Normocephalic and atraumatic.      Right Ear: External ear normal.      Left Ear: External ear normal.      Mouth/Throat:      Pharynx: No oropharyngeal exudate.   Eyes:      Conjunctiva/sclera: Conjunctivae normal.      Pupils: Pupils are equal, round, and reactive to light.   Cardiovascular:      Rate and Rhythm: Normal rate and regular rhythm.      Heart sounds: No murmur heard.  No friction rub. No gallop.    Pulmonary:      Effort: Pulmonary effort is normal.      Breath sounds: Normal breath " sounds. No wheezing or rhonchi.   Abdominal:      General: Bowel sounds are normal. There is no distension.      Palpations: Abdomen is soft.      Tenderness: There is no abdominal tenderness.   Skin:     General: Skin is warm and dry.   Neurological:      Mental Status: He is alert and oriented to person, place, and time.      Cranial Nerves: No cranial nerve deficit.   Psychiatric:         Mood and Affect: Mood and affect normal.         Behavior: Behavior normal.         Thought Content: Thought content normal.         Judgment: Judgment normal.                       Assessment and Plan   Diagnoses and all orders for this visit:    1. Type 2 diabetes mellitus without complication, without long-term current use of insulin (HCC) (Primary)  Comments:  A1C 6.6, wants to try diet and healthy lifestyle. will start metformin if not controlled with next BW  Orders:  -     CBC & Differential; Future  -     Comprehensive Metabolic Panel; Future  -     Hemoglobin A1c; Future  -     Lipid Panel; Future  -     MicroAlbumin, Urine, Random - Urine, Clean Catch; Future    2. History of ST elevation myocardial infarction (STEMI)  Comments:  c/w cardiology   Orders:  -     CBC & Differential; Future  -     Comprehensive Metabolic Panel; Future  -     Hemoglobin A1c; Future  -     Lipid Panel; Future  -     MicroAlbumin, Urine, Random - Urine, Clean Catch; Future    3. Stented coronary artery  Comments:  c/w ASA/Plavix and BB  Orders:  -     CBC & Differential; Future  -     Comprehensive Metabolic Panel; Future  -     Hemoglobin A1c; Future  -     Lipid Panel; Future  -     MicroAlbumin, Urine, Random - Urine, Clean Catch; Future    4. Gastroesophageal reflux disease without esophagitis  -     CBC & Differential; Future  -     Comprehensive Metabolic Panel; Future  -     Hemoglobin A1c; Future  -     Lipid Panel; Future  -     MicroAlbumin, Urine, Random - Urine, Clean Catch; Future    5. Pulmonary nodule  Comments:  rpt CT in  2022  Orders:  -     CBC & Differential; Future  -     Comprehensive Metabolic Panel; Future  -     Hemoglobin A1c; Future  -     Lipid Panel; Future  -     MicroAlbumin, Urine, Random - Urine, Clean Catch; Future    6. Smoker  -     CBC & Differential; Future  -     Comprehensive Metabolic Panel; Future  -     Hemoglobin A1c; Future  -     Lipid Panel; Future  -     MicroAlbumin, Urine, Random - Urine, Clean Catch; Future    7. Positive colorectal cancer screening using Cologuard test  Comments:  Pending colonoscopy. (off note: recentlly stented CA and dual antiplts), may need clearance from cardiology. patient is aware   Orders:  -     CBC & Differential; Future  -     Comprehensive Metabolic Panel; Future  -     Hemoglobin A1c; Future  -     Lipid Panel; Future  -     MicroAlbumin, Urine, Random - Urine, Clean Catch; Future            Follow Up   Return in about 3 months (around 1/22/2022) for Recheck.  Patient was given instructions and counseling regarding his condition or for health maintenance advice. Please see specific information pulled into the AVS if appropriate.

## 2021-11-05 PROBLEM — Z12.11 SCREENING FOR COLON CANCER: Status: ACTIVE | Noted: 2021-11-05

## 2021-11-05 RX ORDER — SODIUM, POTASSIUM,MAG SULFATES 17.5-3.13G
1 SOLUTION, RECONSTITUTED, ORAL ORAL EVERY 12 HOURS
Qty: 354 ML | Refills: 0 | Status: SHIPPED | OUTPATIENT
Start: 2021-11-05 | End: 2022-02-14 | Stop reason: SDUPTHER

## 2022-01-13 ENCOUNTER — TELEPHONE (OUTPATIENT)
Dept: GASTROENTEROLOGY | Facility: CLINIC | Age: 62
End: 2022-01-13

## 2022-01-13 ENCOUNTER — TELEPHONE (OUTPATIENT)
Dept: CARDIOLOGY | Facility: CLINIC | Age: 62
End: 2022-01-13

## 2022-01-13 NOTE — TELEPHONE ENCOUNTER
----- Message from Janay PARKER Flores sent at 1/13/2022  1:23 PM EST -----  Regarding: FW: COLONOSCOPY TOMORROW 1/13 OK TO HOLD BRILINTA AND ASA?  CALLED AGAIN NEEDS ANSWER BEFORE 3:00 TODAY  LETTER IS IN CHART FROM September RE THIS   ----- Message -----  From: Mark Janay PARKER  Sent: 1/13/2022  10:19 AM EST  To: [facility] Clinical Pool  Subject: COLONOSCOPY TOMORROW 1/13 OK TO HOLD BRILINT#    JEAN-PAUL W/ERINN CHEEMA  [phone]    Procedure has been canceled due to has not been 6 months since his stent placement and can't stop blood thinners.

## 2022-01-13 NOTE — TELEPHONE ENCOUNTER
Blood Thinner/Cardiac Clearance                                                                                                                 [facility]    1/13/2022    Dear Dr Gonzales,     Patient: Nba Gaffney   YOB: 1960        This patient is waiting to have a Colonoscopy and/or Esophagogastroduodenoscopy which I will perform at [facility] on.     Our records indicate this patient is currently taking Brilinta. This procedure requires the patient to suspend their anticoagulant medication prior to surgery.     Please respond to this request noting your recommendations. You may contact our office at [phone] Option 3 with any questions. I appreciate your prompt response in this matter.     Please return this form to our office as soon as possible to [phone]    ____ I approve my patient to stop taking their Anticoagulant Therapy medication 5 days prior to the scheduled procedure.    ____ I do NOT approve my patient to stop taking their Anticoagulant Therapy medication at this time.    ____ I approve my patient from a cardiac standpoint.    ____ I do NOT approve my patient from a cardiac standpoint at this time.    Approving physician name (please print):     _____________________________________________    Approving physician signature:     ________________________________    Date:________________      Sincerely,  [facility]   Gastroenterology -

## 2022-01-20 ENCOUNTER — LAB (OUTPATIENT)
Dept: LAB | Facility: HOSPITAL | Age: 62
End: 2022-01-20

## 2022-01-20 DIAGNOSIS — F17.200 SMOKER: ICD-10-CM

## 2022-01-20 DIAGNOSIS — R91.1 PULMONARY NODULE: ICD-10-CM

## 2022-01-20 DIAGNOSIS — K21.9 GASTROESOPHAGEAL REFLUX DISEASE WITHOUT ESOPHAGITIS: ICD-10-CM

## 2022-01-20 DIAGNOSIS — R19.5 POSITIVE COLORECTAL CANCER SCREENING USING COLOGUARD TEST: ICD-10-CM

## 2022-01-20 DIAGNOSIS — I25.2 HISTORY OF ST ELEVATION MYOCARDIAL INFARCTION (STEMI): ICD-10-CM

## 2022-01-20 DIAGNOSIS — Z95.5 STENTED CORONARY ARTERY: ICD-10-CM

## 2022-01-20 DIAGNOSIS — E11.9 TYPE 2 DIABETES MELLITUS WITHOUT COMPLICATION, WITHOUT LONG-TERM CURRENT USE OF INSULIN: ICD-10-CM

## 2022-01-20 LAB
ALBUMIN SERPL-MCNC: 4.4 G/DL (ref 3.5–5.2)
ALBUMIN UR-MCNC: <1.2 MG/DL
ALBUMIN/GLOB SERPL: 1.6 G/DL
ALP SERPL-CCNC: 80 U/L (ref 39–117)
ALT SERPL W P-5'-P-CCNC: 24 U/L (ref 1–41)
ANION GAP SERPL CALCULATED.3IONS-SCNC: 8.6 MMOL/L (ref 5–15)
AST SERPL-CCNC: 18 U/L (ref 1–40)
BASOPHILS # BLD AUTO: 0.17 10*3/MM3 (ref 0–0.2)
BASOPHILS NFR BLD AUTO: 1.8 % (ref 0–1.5)
BILIRUB SERPL-MCNC: 0.4 MG/DL (ref 0–1.2)
BUN SERPL-MCNC: 9 MG/DL (ref 6–20)
BUN/CREAT SERPL: 11 (ref 7–25)
CALCIUM SPEC-SCNC: 9.2 MG/DL (ref 8.6–10.5)
CHLORIDE SERPL-SCNC: 106 MMOL/L (ref 98–107)
CHOLEST SERPL-MCNC: 141 MG/DL (ref 0–200)
CO2 SERPL-SCNC: 25.4 MMOL/L (ref 22–29)
CREAT SERPL-MCNC: 0.82 MG/DL (ref 0.76–1.27)
DEPRECATED RDW RBC AUTO: 43.4 FL (ref 37–54)
EOSINOPHIL # BLD AUTO: 0.28 10*3/MM3 (ref 0–0.4)
EOSINOPHIL NFR BLD AUTO: 2.9 % (ref 0.3–6.2)
ERYTHROCYTE [DISTWIDTH] IN BLOOD BY AUTOMATED COUNT: 13.3 % (ref 12.3–15.4)
GFR SERPL CREATININE-BSD FRML MDRD: 96 ML/MIN/1.73
GLOBULIN UR ELPH-MCNC: 2.8 GM/DL
GLUCOSE SERPL-MCNC: 117 MG/DL (ref 65–99)
HBA1C MFR BLD: 7.19 % (ref 4.8–5.6)
HCT VFR BLD AUTO: 46.9 % (ref 37.5–51)
HDLC SERPL-MCNC: 34 MG/DL (ref 40–60)
HGB BLD-MCNC: 15.8 G/DL (ref 13–17.7)
IMM GRANULOCYTES # BLD AUTO: 0.01 10*3/MM3 (ref 0–0.05)
IMM GRANULOCYTES NFR BLD AUTO: 0.1 % (ref 0–0.5)
LDLC SERPL CALC-MCNC: 85 MG/DL (ref 0–100)
LDLC/HDLC SERPL: 2.44 {RATIO}
LYMPHOCYTES # BLD AUTO: 3.83 10*3/MM3 (ref 0.7–3.1)
LYMPHOCYTES NFR BLD AUTO: 40 % (ref 19.6–45.3)
MCH RBC QN AUTO: 30.1 PG (ref 26.6–33)
MCHC RBC AUTO-ENTMCNC: 33.7 G/DL (ref 31.5–35.7)
MCV RBC AUTO: 89.3 FL (ref 79–97)
MONOCYTES # BLD AUTO: 0.77 10*3/MM3 (ref 0.1–0.9)
MONOCYTES NFR BLD AUTO: 8 % (ref 5–12)
NEUTROPHILS NFR BLD AUTO: 4.51 10*3/MM3 (ref 1.7–7)
NEUTROPHILS NFR BLD AUTO: 47.2 % (ref 42.7–76)
NRBC BLD AUTO-RTO: 0 /100 WBC (ref 0–0.2)
PLATELET # BLD AUTO: 409 10*3/MM3 (ref 140–450)
PMV BLD AUTO: 9.8 FL (ref 6–12)
POTASSIUM SERPL-SCNC: 4.5 MMOL/L (ref 3.5–5.2)
PROT SERPL-MCNC: 7.2 G/DL (ref 6–8.5)
RBC # BLD AUTO: 5.25 10*6/MM3 (ref 4.14–5.8)
SODIUM SERPL-SCNC: 140 MMOL/L (ref 136–145)
TRIGL SERPL-MCNC: 121 MG/DL (ref 0–150)
VLDLC SERPL-MCNC: 22 MG/DL (ref 5–40)
WBC NRBC COR # BLD: 9.57 10*3/MM3 (ref 3.4–10.8)

## 2022-01-20 PROCEDURE — 80061 LIPID PANEL: CPT

## 2022-01-20 PROCEDURE — 83036 HEMOGLOBIN GLYCOSYLATED A1C: CPT

## 2022-01-20 PROCEDURE — 85025 COMPLETE CBC W/AUTO DIFF WBC: CPT

## 2022-01-20 PROCEDURE — 80053 COMPREHEN METABOLIC PANEL: CPT

## 2022-01-20 PROCEDURE — 82043 UR ALBUMIN QUANTITATIVE: CPT

## 2022-01-20 PROCEDURE — 36415 COLL VENOUS BLD VENIPUNCTURE: CPT

## 2022-01-21 ENCOUNTER — OFFICE VISIT (OUTPATIENT)
Dept: FAMILY MEDICINE CLINIC | Facility: CLINIC | Age: 62
End: 2022-01-21

## 2022-01-21 VITALS
SYSTOLIC BLOOD PRESSURE: 140 MMHG | RESPIRATION RATE: 18 BRPM | WEIGHT: 229.4 LBS | DIASTOLIC BLOOD PRESSURE: 74 MMHG | BODY MASS INDEX: 34.77 KG/M2 | TEMPERATURE: 97.7 F | HEIGHT: 68 IN | OXYGEN SATURATION: 98 % | HEART RATE: 75 BPM

## 2022-01-21 DIAGNOSIS — Z95.5 STENTED CORONARY ARTERY: ICD-10-CM

## 2022-01-21 DIAGNOSIS — Z00.00 ANNUAL PHYSICAL EXAM: Primary | ICD-10-CM

## 2022-01-21 DIAGNOSIS — E11.9 TYPE 2 DIABETES MELLITUS WITHOUT COMPLICATION, WITHOUT LONG-TERM CURRENT USE OF INSULIN: ICD-10-CM

## 2022-01-21 DIAGNOSIS — I25.2 HISTORY OF ST ELEVATION MYOCARDIAL INFARCTION (STEMI): ICD-10-CM

## 2022-01-21 DIAGNOSIS — R91.1 PULMONARY NODULE: ICD-10-CM

## 2022-01-21 PROCEDURE — 99396 PREV VISIT EST AGE 40-64: CPT | Performed by: STUDENT IN AN ORGANIZED HEALTH CARE EDUCATION/TRAINING PROGRAM

## 2022-01-21 PROCEDURE — 99214 OFFICE O/P EST MOD 30 MIN: CPT | Performed by: STUDENT IN AN ORGANIZED HEALTH CARE EDUCATION/TRAINING PROGRAM

## 2022-01-21 NOTE — PROGRESS NOTES
"Chief Complaint  Follow-up on type 2 diabetes/cardiac disease/pulmonary nodules and recently abnormal blood work, also annual physical exam    Subjective         Nba Gaffney is a 61 y.o. male who presents to [facility]  61 years old male with past medical history of STEMI/recently stented coronary artery, pulmonary nodule, former smoker, comes to the clinic today to follow-up on chronic conditions/medication management and annual physical.    Following up with cardiologist.  Denies any chest pain or shortness of breath on exertion.    Type 2 diabetes; worsening, patient agrees to start metformin.  Lifestyle modifications discussed    Cardiac disease//history of STEMI/stented coronary artery; continue with dual antiplatelets/statin and beta-blocker.    No other acute complaints at this time    Review of Systems   Objective   Vital Signs:   Vitals:    01/21/22 0723   BP: 140/74   Pulse: 75   Resp: 18   Temp: 97.7 °F (36.5 °C)   SpO2: 98%   Weight: 104 kg (229 lb 6.4 oz)   Height: 172.7 cm (68\")      Body mass index is 34.88 kg/m².   Physical Exam  Vitals reviewed.   Constitutional:       Appearance: Normal appearance. He is well-developed.   HENT:      Head: Normocephalic and atraumatic.      Right Ear: External ear normal.      Left Ear: External ear normal.      Mouth/Throat:      Pharynx: No oropharyngeal exudate.   Eyes:      Conjunctiva/sclera: Conjunctivae normal.      Pupils: Pupils are equal, round, and reactive to light.   Cardiovascular:      Rate and Rhythm: Normal rate and regular rhythm.      Heart sounds: No murmur heard.  No friction rub. No gallop.    Pulmonary:      Effort: Pulmonary effort is normal.      Breath sounds: Normal breath sounds. No wheezing or rhonchi.   Abdominal:      General: Bowel sounds are normal. There is no distension.      Palpations: Abdomen is soft.      Tenderness: There is no abdominal tenderness.   Skin:     General: Skin is warm and " dry.   Neurological:      Mental Status: He is alert and oriented to person, place, and time.      Cranial Nerves: No cranial nerve deficit.   Psychiatric:         Mood and Affect: Mood and affect normal.         Behavior: Behavior normal.         Thought Content: Thought content normal.         Judgment: Judgment normal.                 Assessment and Plan   Diagnoses and all orders for this visit:    1. Annual physical exam (Primary)  Comments:  Daily exercise and healthy diet recommended  Orders:  -     TSH Rfx On Abnormal To Free T4; Future  -     CBC & Differential; Future  -     Comprehensive Metabolic Panel; Future  -     Hemoglobin A1c; Future  -     Lipid Panel; Future    2. Type 2 diabetes mellitus without complication, without long-term current use of insulin (HCC)  Comments:  Worsening A1c.  We will start patient on metformin, will titrate up as tolerated.  Continue with statin and consider adding lisinopril in future.  Lifestyle mod  Orders:  -     metFORMIN (Glucophage) 850 MG tablet; Take 1 tablet by mouth 2 (Two) Times a Day With Meals.  Dispense: 180 tablet; Refill: 0  -     TSH Rfx On Abnormal To Free T4; Future  -     CBC & Differential; Future  -     Comprehensive Metabolic Panel; Future  -     Hemoglobin A1c; Future  -     Lipid Panel; Future    3. History of ST elevation myocardial infarction (STEMI)  Comments:  Continue with cardiologist, recently stented coronary arteries, continue with dual antiplatelets/beta-blocker and statin  Orders:  -     TSH Rfx On Abnormal To Free T4; Future  -     CBC & Differential; Future  -     Comprehensive Metabolic Panel; Future  -     Hemoglobin A1c; Future  -     Lipid Panel; Future    4. Stented coronary artery  -     TSH Rfx On Abnormal To Free T4; Future  -     CBC & Differential; Future  -     Comprehensive Metabolic Panel; Future  -     Hemoglobin A1c; Future  -     Lipid Panel; Future    5. Pulmonary nodule  Comments:  Repeat CT scan scheduled for this  year  Orders:  -     TSH Rfx On Abnormal To Free T4; Future  -     CBC & Differential; Future  -     Comprehensive Metabolic Panel; Future  -     Hemoglobin A1c; Future  -     Lipid Panel; Future    Recent blood work reviewed with patient        Follow Up   Return in about 6 months (around 7/21/2022) for Recheck, Next scheduled follow up.  Patient was given instructions and counseling regarding his condition or for health maintenance advice. Please see specific information pulled into the AVS if appropriate.

## 2022-02-14 ENCOUNTER — OFFICE VISIT (OUTPATIENT)
Dept: CARDIOLOGY | Facility: CLINIC | Age: 62
End: 2022-02-14

## 2022-02-14 VITALS
BODY MASS INDEX: 33.95 KG/M2 | WEIGHT: 224 LBS | DIASTOLIC BLOOD PRESSURE: 72 MMHG | HEART RATE: 73 BPM | SYSTOLIC BLOOD PRESSURE: 129 MMHG | HEIGHT: 68 IN

## 2022-02-14 DIAGNOSIS — E78.5 HYPERLIPIDEMIA, UNSPECIFIED HYPERLIPIDEMIA TYPE: ICD-10-CM

## 2022-02-14 DIAGNOSIS — I25.10 CORONARY ARTERY DISEASE INVOLVING NATIVE CORONARY ARTERY OF NATIVE HEART WITHOUT ANGINA PECTORIS: Primary | ICD-10-CM

## 2022-02-14 PROCEDURE — 99213 OFFICE O/P EST LOW 20 MIN: CPT | Performed by: INTERNAL MEDICINE

## 2022-02-14 NOTE — PROGRESS NOTES
6 MO FOLLOW UP   Subjective:        Nba Gaffney is a 61 y.o. male who here for follow up    CC  Follow-up coronary artery disease hyperlipidemia  HPI  61-year-old male with coronary artery disease, hyperlipidemia here for the follow-up with no complaints of chest pains tightness heaviness or the pressure sensation     Problems Addressed this Visit        Cardiac and Vasculature    Coronary artery disease involving native coronary artery of native heart without angina pectoris - Primary      Other Visit Diagnoses     Hyperlipidemia, unspecified hyperlipidemia type          Diagnoses       Codes Comments    Coronary artery disease involving native coronary artery of native heart without angina pectoris    -  Primary ICD-10-CM: I25.10  ICD-9-CM: 414.01     Hyperlipidemia, unspecified hyperlipidemia type     ICD-10-CM: E78.5  ICD-9-CM: 272.4         .    The following portions of the patient's history were reviewed and updated as appropriate: allergies, current medications, past family history, past medical history, past social history, past surgical history and problem list.    Past Medical History:   Diagnosis Date   • Environmental allergies     SEASONAL ALLERGIES   • Hyperlipidemia    • Hypertension    • Incisional hernia 2008   • Lumbago     LOW BACK PAIN   • Lumbar disc herniation 03/17/2014    L4-5   • Lumbar spinal stenosis 03/17/2014   • MI (myocardial infarction) ([facility]) 07/28/2021    SURGERY - 2 STENTS PLACED  7-2021- SEE'S DR Gonzales, DENIED  CP/SOB   • Shoulder pain, left      reports that he has been smoking cigarettes. He has a 46.00 pack-year smoking history. He has never used smokeless tobacco. He reports previous alcohol use. He reports previous drug use.   Family History   Problem Relation Age of Onset   • Malig Hyperthermia Neg Hx        Review of Systems  Constitutional: No wt loss, fever, fatigue  Gastrointestinal: No nausea, abdominal pain  Behavioral/Psych: No insomnia or anxiety    "Cardiovascular no chest pains or tightness in the chest  Objective:       Physical Exam  /72   Pulse 73   Ht 172.7 cm (68\")   Wt 102 kg (224 lb)   BMI 34.06 kg/m²   General appearance: No acute changes   Neck: Trachea midline; NECK, supple, no thyromegaly or lymphadenopathy   Lungs: Normal size and shape, normal breath sounds, equal distribution of air, no rales and rhonchi   CV: S1-S2 regular, no murmurs, no rub, no gallop   Abdomen: Soft, nontender; no masses , no abnormal abdominal sounds   Extremities: No deformity , normal color , no peripheral edema   Skin: Normal temperature, turgor and texture; no rash, ulcers          Procedures    Conclusion       · Successful right and left coronary angiogram and LV gram  · Successful thrombectomy of the right coronary artery  · Successful angioplasty and stent to the mid % reduced to 0% with 3.5/15 Xience stent  · Normal left main  · Left anterior descending shows early atherosclerotic plaque including the diagonal and  branches  · Circumflex artery ostial approximately 50% narrowed midportion 50% narrowed  · Right coronary artery dominant 100% occluded proximal to mid, reduced to 0% with thrombectomy as well as angioplasty with 3.5/15 Xience stent, moderate distal disease of 50 to 60% and PDA posterolateral branches  · LV gram showed normal LV function EF of 50%       Echocardiogram:        Current Outpatient Medications:   •  aspirin 81 MG EC tablet, Take 1 tablet by mouth Daily. (Patient taking differently: Take 81 mg by mouth Daily. PT REPORTED UNSURE WHEN TO STOP MEDICINE, NOTIFIED DR YOUNGBLOOD OFFICE ([facility]), OFFICE TO CALL PT- CINTHIA SHORE AT THE OFFICE OK TO CONTINUE ASA FOR THE PROCEDURE.), Disp: 30 tablet, Rfl: 11  •  atorvastatin (LIPITOR) 40 MG tablet, Take 1 tablet by mouth Every Night., Disp: 30 tablet, Rfl: 11  •  metFORMIN (Glucophage) 850 MG tablet, Take 1 tablet by mouth 2 (Two) Times a Day With Meals., Disp: 180 tablet, Rfl: " 0  •  metoprolol succinate XL (TOPROL-XL) 25 MG 24 hr tablet, Take 1 tablet by mouth Daily., Disp: 30 tablet, Rfl: 11  •  ticagrelor (Brilinta) 90 MG tablet tablet, Take 1 tablet by mouth 2 (two) times a day. (Patient taking differently: Take 90 mg by mouth. PT REPORTED UNSURE WHEN TO STOP MEDICINE, NOTIFIED DR YOUNGBLOOD OFFICE ([facility]), PER THE OFFICE ANTICOAGULANT CLEARANCE SENT TO THE CARDIOLOGY, NO RESPONSE FROM MD. OFFICE STATED THEY WILL FOLLOW UP WITH CARDIOL), Disp: 60 tablet, Rfl: 11  •  sodium-potassium-magnesium sulfates (Suprep Bowel Prep Kit) 17.5-3.13-1.6 GM/177ML solution oral solution, Take 2 bottles by mouth Every 12 (Twelve) Hours. Take as directed by prescriber's office, Disp: 177 mL, Rfl: 0   Assessment:        Patient Active Problem List   Diagnosis   • Traumatic complete tear of left rotator cuff   • S/P rotator cuff surgery   • Arthrofibrosis of left shoulder   • Post-operative state   • AMI (acute myocardial infarction) (HCC)   • Coronary artery disease involving native coronary artery of native heart without angina pectoris   • Tobacco use   • History of ST elevation myocardial infarction (STEMI)   • Stented coronary artery   • Type 2 diabetes mellitus without complication, without long-term current use of insulin (HCC)   • Gastroesophageal reflux disease without esophagitis   • Pulmonary nodule   • Smoker   • Positive colorectal cancer screening using Cologuard test   • Screening for colon cancer               Plan:            ICD-10-CM ICD-9-CM   1. Coronary artery disease involving native coronary artery of native heart without angina pectoris  I25.10 414.01   2. Hyperlipidemia, unspecified hyperlipidemia type  E78.5 272.4     1. Coronary artery disease involving native coronary artery of native heart without angina pectoris  No angina pectoris we will stop the Brilinta in July    2. Hyperlipidemia, unspecified hyperlipidemia type  Continue current treatment     STOP BRILLINTA IN July  SEE  IN 1 YR  COUNSELING:    Nba Marie was given to patient for the following topics: diagnostic results, risk factor reductions, impressions, risks and benefits of treatment options and importance of treatment compliance .       SMOKING COUNSELING:    [unfilled]    Dictated using Dragon dictation

## 2022-02-24 ENCOUNTER — OFFICE VISIT (OUTPATIENT)
Dept: ORTHOPEDIC SURGERY | Facility: CLINIC | Age: 62
End: 2022-02-24

## 2022-02-24 DIAGNOSIS — Z98.890 S/P ROTATOR CUFF SURGERY: Primary | ICD-10-CM

## 2022-02-24 PROCEDURE — 99213 OFFICE O/P EST LOW 20 MIN: CPT | Performed by: ORTHOPAEDIC SURGERY

## 2022-05-04 DIAGNOSIS — E11.9 TYPE 2 DIABETES MELLITUS WITHOUT COMPLICATION, WITHOUT LONG-TERM CURRENT USE OF INSULIN: ICD-10-CM

## 2022-07-06 ENCOUNTER — TRANSCRIBE ORDERS (OUTPATIENT)
Dept: ADMINISTRATIVE | Facility: HOSPITAL | Age: 62
End: 2022-07-06

## 2022-07-06 DIAGNOSIS — S39.012A LUMBAR STRAIN, INITIAL ENCOUNTER: Primary | ICD-10-CM

## 2022-07-08 ENCOUNTER — HOSPITAL ENCOUNTER (OUTPATIENT)
Dept: MRI IMAGING | Facility: HOSPITAL | Age: 62
Discharge: HOME | End: 2022-07-08
Admitting: EMERGENCY MEDICINE

## 2022-07-08 DIAGNOSIS — S39.012A LUMBAR STRAIN, INITIAL ENCOUNTER: ICD-10-CM

## 2022-07-08 PROCEDURE — 72148 MRI LUMBAR SPINE W/O DYE: CPT

## 2022-07-28 ENCOUNTER — OFFICE VISIT (OUTPATIENT)
Dept: NEUROSURGERY | Facility: CLINIC | Age: 62
End: 2022-07-28

## 2022-07-28 VITALS
HEART RATE: 110 BPM | DIASTOLIC BLOOD PRESSURE: 82 MMHG | BODY MASS INDEX: 31.67 KG/M2 | SYSTOLIC BLOOD PRESSURE: 107 MMHG | WEIGHT: 209 LBS | HEIGHT: 68 IN

## 2022-07-28 DIAGNOSIS — M51.26 HERNIATED NUCLEUS PULPOSUS, L4-5 LEFT: Primary | ICD-10-CM

## 2022-07-28 DIAGNOSIS — M47.816 FACET ARTHROPATHY, LUMBAR: ICD-10-CM

## 2022-07-28 DIAGNOSIS — G89.29 CHRONIC MIDLINE LOW BACK PAIN WITH RIGHT-SIDED SCIATICA: ICD-10-CM

## 2022-07-28 DIAGNOSIS — M54.41 CHRONIC MIDLINE LOW BACK PAIN WITH RIGHT-SIDED SCIATICA: ICD-10-CM

## 2022-07-28 PROCEDURE — 99214 OFFICE O/P EST MOD 30 MIN: CPT | Performed by: PHYSICIAN ASSISTANT

## 2022-07-28 NOTE — PROGRESS NOTES
"Chief Complaint  Leg Pain (Right  /Pain goes to below knee), Back Pain, Numbness (Right  leg/Pain goes to below knee), and Tingling (Right  leg/Pain goes to below knee)    Subjective          Nba Gaffney who is a 61 y.o. year old male who presents to [facility] for Evaluation of the Spine.     The patient complains of pain located in the Lumbar Spine.  Patients states the pain has been present for 6 weeks.  The pain came on acutely.  The pain scaled level is 9.  The pain does radiate. Dermatomes are located on right Lumbar at: just below the knee.  The pain is Intermittent and described as sharp and burning.  The pain is worse in the morning. Patient states applying pressure to his back in a certain place makes the pain better.  Patient states sitting makes the pain worse.    Associated Symptoms Include: Numbness and Tingling in the right thigh. He denies weakness or loss of bowel or bladder control.  Conservative Interventions Include: Oral Steroids that were ineffective., Pain Medications that were not very effective. and Muscle Relaxants that were not very effective.    Was this the result of an injury or accident?: Yes, he lifted a trailer on 6/13/22 which caused his pain    History of Previous Spinal Surgery?: No     reports that he has been smoking cigarettes. He has a 23.00 pack-year smoking history. He has never used smokeless tobacco.    Review of Systems   Musculoskeletal: Positive for back pain, gait problem and myalgias.   Neurological: Positive for numbness.        Objective   Vital Signs:   /82   Pulse 110   Ht 172.7 cm (68\")   Wt 94.8 kg (209 lb)   BMI 31.78 kg/m²       Physical Exam  Constitutional:       Appearance: Normal appearance. He is obese.   Pulmonary:      Effort: Pulmonary effort is normal.   Musculoskeletal:         General: Tenderness (midline lumbar spine, right SI joint area) present.      Comments: SLR on the left causes " "\"pressure\" in the right lower back.   Neurological:      General: No focal deficit present.      Mental Status: He is alert and oriented to person, place, and time.      Sensory: No sensory deficit.      Motor: No weakness.      Deep Tendon Reflexes: Reflexes normal.   Psychiatric:         Mood and Affect: Mood normal.         Behavior: Behavior normal.        Neurologic Exam     Mental Status   Oriented to person, place, and time.        Result Review     I have personally reviewed the MRI of lumbar spine without contrast from 7/8/2022 which shows severe multilevel degenerative disc disease and facet arthritis, there is moderate to severe central canal stenosis at L4-L5 and L2-L3, there appears to be a disc extrusion on the left at L4-L5 possibly contacting the descending left L5 nerve root. There is mild to moderate central canal stenosis at L5-S1, there is bilateral foraminal stenosis at all of these levels most severe on the right at L5-S1 and on the left at L4-L5 and L5-S1.     Assessment and Plan    Diagnoses and all orders for this visit:    1. Herniated nucleus pulposus, L4-5 left (Primary)  -     Ambulatory Referral to Physical Therapy Evaluate and treat; Heat, Electrotherapy; E-stim; Stretching (CORE STRENGTH), ROM, Strengthening  -     Ambulatory Referral to Pain Management    2. Facet arthropathy, lumbar  -     Ambulatory Referral to Physical Therapy Evaluate and treat; Heat, Electrotherapy; E-stim; Stretching (CORE STRENGTH), ROM, Strengthening  -     Ambulatory Referral to Pain Management    3. Chronic midline low back pain with right-sided sciatica  -     Ambulatory Referral to Physical Therapy Evaluate and treat; Heat, Electrotherapy; E-stim; Stretching (CORE STRENGTH), ROM, Strengthening  -     Ambulatory Referral to Pain Management    His pain is worse in the right leg to just below the knee.    He does have relatively severe stenosis in the central canal at L2-L3. I suspect he may have L3 nerve " irritation on the right which contributes to his right leg pain.    I do think he may benefit from a laminectomy and possibly a discectomy at this level.    He could consider physical therapy, he is going to defer for now.    He could consider a trial of LESB. He may be willing to consider this - he did discuss the possibility of surgery with Dr. Padron today. He was recommended to try PT and injection therapy prior to considering surgery, but Dr. Padron may consider a discectomy at L2-L3 later if conservative treatment fails.    I am recommending the following restrictions: No heavy lifting greater than 10 lb, limit twisting and bending at the waist, avoidance of strenuous or jarring activity to the spine. I do expect him to be off work at least for the next 4 weeks until he can be reassessed.    We discussed the importance of smoking/nicotine cessation. Smoking/nicotine use has multiple health risks. In particular related to the spine, nicotine increases the incidence of lower back pain, speeds up the progression of degenerative disc disease and dramatically reduces healing after spine surgery (particularly a fusion operation).    The patient was counseled on basic recommendations for the reduction and prevention of back, neck, or spine pain in association with spinal disorders, including: cessation/avoidance of nicotine use, maintenance of a healthy BMI and weight, focusing on building/maintaining core strength through core exercise, and avoidance of activities which worsen the pain. The patient will monitor for changes in symptoms and notify our clinic of these changes as needed.    He will follow-up in 4 weeks to reassess.    Follow Up   Return in about 4 weeks (around 8/25/2022).  Patient was given instructions and counseling regarding his condition or for health maintenance advice. Please see specific information pulled into the AVS if appropriate.

## 2022-08-01 ENCOUNTER — TELEPHONE (OUTPATIENT)
Dept: NEUROSURGERY | Facility: CLINIC | Age: 62
End: 2022-08-01

## 2022-08-01 DIAGNOSIS — M51.26 HERNIATED NUCLEUS PULPOSUS, L4-5 LEFT: Primary | ICD-10-CM

## 2022-08-01 RX ORDER — CYCLOBENZAPRINE HCL 10 MG
10 TABLET ORAL 2 TIMES DAILY PRN
Qty: 30 TABLET | Refills: 0 | Status: SHIPPED | OUTPATIENT
Start: 2022-08-01 | End: 2022-08-19 | Stop reason: SDUPTHER

## 2022-08-01 RX ORDER — CYCLOBENZAPRINE HCL 10 MG
10 TABLET ORAL 2 TIMES DAILY PRN
Qty: 30 TABLET | Refills: 0 | Status: CANCELLED | OUTPATIENT
Start: 2022-08-01

## 2022-08-01 RX ORDER — HYDROCODONE BITARTRATE AND ACETAMINOPHEN 10; 325 MG/1; MG/1
1 TABLET ORAL EVERY 8 HOURS PRN
Qty: 40 TABLET | Refills: 0 | Status: SHIPPED | OUTPATIENT
Start: 2022-08-03 | End: 2022-08-19 | Stop reason: SDUPTHER

## 2022-08-01 RX ORDER — HYDROCODONE BITARTRATE AND ACETAMINOPHEN 10; 325 MG/1; MG/1
1 TABLET ORAL 3 TIMES DAILY PRN
Qty: 40 TABLET | Refills: 0 | Status: CANCELLED | OUTPATIENT
Start: 2022-08-01

## 2022-08-01 NOTE — TELEPHONE ENCOUNTER
Caller: ASHLEY FUNESSANTY    Relationship: Emergency Contact ( [employer])    Best call back number: [phone]    Requested Prescriptions:   Requested Prescriptions     Pending Prescriptions Disp Refills   • HYDROcodone-acetaminophen (NORCO)  MG per tablet 40 tablet 0     Sig: Take 1 tablet by mouth 3 (Three) Times a Day As Needed.   • cyclobenzaprine (FLEXERIL) 10 MG tablet 30 tablet 0     Sig: Take 1 tablet by mouth 2 (Two) Times a Day As Needed.        Pharmacy where request should be sent:    [facility] [place] [address] [place] [address]     Additional details provided by patient:   PATIENT'S WIFE STATES PATIENT IS OUT OF HYDROCODONE RX, AND MUSCLE RELAXER; SHE THOUGHT FROM APPT ON 7/27/22 W/ [facility] THAT WE WERE GOING TO BE TAKING OVER REFILLING THESE MEDICATIONS.     PLEASE CALL PATIENT IF THIS IS NOT THE CASE, OR CALL ONCE THESE HAVE BEEN ORDERED TO PHARMACY    Does the patient have less than a 3 day supply:  [x] Yes  [] No    Pernell PURVIS Rep   08/01/22 10:17 EDT

## 2022-08-02 ENCOUNTER — TELEPHONE (OUTPATIENT)
Dept: NEUROSURGERY | Facility: CLINIC | Age: 62
End: 2022-08-02

## 2022-08-02 NOTE — TELEPHONE ENCOUNTER
Caller: ZAIRE FUNES    Relationship: Emergency Contact    Best call back number: [phone]    What form or medical record are you requesting: REFERRAL ORDER    Who is requesting this form or medical record from you: [employer]    How would you like to receive the form or medical records (pick-up, mail, fax):   If fax, what is the fax number: [phone]  ATT: GRICELDA MEDRANO    Timeframe paperwork needed: ASAP    Additional notes: PT CALLED [employer] AND WAS TOLD THAT THEY HAVE NOT RECEIVED REFERRALS FOR PHYSICAL THERAPY AND PAIN MANAGEMENT-INJECTIONS.    THIS NEEDS TO BE DONE ASAP, PT IS IN A LOT OF PAIN AND IS WANTING TO GET THESE APPT SCHEDULED.    PLEASE CONTACT PIT TO ADVISE ONCE REFERRALS HAVE BEEN FAXED TO GRICELDA MEDRANO.

## 2022-08-03 RX ORDER — METOPROLOL SUCCINATE 25 MG/1
25 TABLET, EXTENDED RELEASE ORAL DAILY
Qty: 30 TABLET | Refills: 3 | Status: SHIPPED | OUTPATIENT
Start: 2022-08-03 | End: 2022-11-30 | Stop reason: SDUPTHER

## 2022-08-08 DIAGNOSIS — E11.9 TYPE 2 DIABETES MELLITUS WITHOUT COMPLICATION, WITHOUT LONG-TERM CURRENT USE OF INSULIN: ICD-10-CM

## 2022-08-08 NOTE — TELEPHONE ENCOUNTER
Please advise medication   Last visit:1/21/22  Next visit:na      Added pharmacy note - patient needs an appt

## 2022-08-09 ENCOUNTER — TELEPHONE (OUTPATIENT)
Dept: NEUROSURGERY | Facility: CLINIC | Age: 62
End: 2022-08-09

## 2022-08-09 ENCOUNTER — TELEPHONE (OUTPATIENT)
Dept: FAMILY MEDICINE CLINIC | Facility: CLINIC | Age: 62
End: 2022-08-09

## 2022-08-09 RX ORDER — ASPIRIN 81 MG/1
81 TABLET, COATED ORAL DAILY
Qty: 90 TABLET | Refills: 11 | Status: SHIPPED | OUTPATIENT
Start: 2022-08-09

## 2022-08-09 NOTE — TELEPHONE ENCOUNTER
Caller: Nba Gaffney    Relationship: Self    Best call back number: [phone]    Requested Prescriptions:   aspirin 81 MG EC tablet  Requested Prescriptions     Pending Prescriptions Disp Refills   • metFORMIN (Glucophage) 850 MG tablet 180 tablet 1     Sig: Take 1 tablet by mouth 2 (Two) Times a Day With Meals.        Pharmacy where request should be sent: [facility] [place]     Does the patient have less than a 3 day supply:  [x] Yes  [] No    Pernell Alcala Rep   08/09/22 13:34 EDT

## 2022-08-09 NOTE — TELEPHONE ENCOUNTER
Tried to call Watson back but there was no answer.  Unfortunately we can only refer your  to PT and Pain Management but we have no control on their scheduling.

## 2022-08-09 NOTE — TELEPHONE ENCOUNTER
PT WIFE CALLED AND STATED THAT [employer] IS NOW REQUESTING A LETTER THAT STATES THAT PAIN MANAGEMENT AND PHYSICAL THERAPY ARE TO BE DONE SIMULTANEOUSLY.      SHE STATED THAT PHYSICAL THERAPY HAS CONTACTED THEM BUT PAIN MANAGEMENT HAS NOT AND THEY WANTED THEM TO BE DONE ON THE SAME DAY.     PLEASE CALL PT AND ADVISE [phone]    THANK YOU

## 2022-08-10 ENCOUNTER — TELEPHONE (OUTPATIENT)
Dept: NEUROSURGERY | Facility: CLINIC | Age: 62
End: 2022-08-10

## 2022-08-10 NOTE — TELEPHONE ENCOUNTER
NOTIFIED THAT A LETTER HAS BEEN FAXED TO MARIFER BESS STATING THAT BOTH PT AND EPIDURAL STERIOD INJECTIONS NEEDS TO BE COMPLETED SIMULTANEOUSLY.

## 2022-08-10 NOTE — TELEPHONE ENCOUNTER
"Pts wife (Watson) called and states that PT has called and is ready to sched yet pain management has not. I reiterated the fact that we have no control over other offices schedules. Pt's wife verbally confirmed that she understood that. Pt's wife states that the pt's place of employment is going to end the Workers Comp if the pt does not take the appt given by PT. Pt's wife states that \"Dr. Padron specifically said that he needs to do physical therapy and pain management simultaneously and the note needs to say that and if Mr. Hair isn't going to do this then we will just go to Dr. Padron.\" Pt's wife was advised that a note was going in for the MA and they would reach out when they are available to.   "
I would have him do both PT and pain management simulatneously, IF workers comp approved them both, but it sounds like workers comp won't let him consult pain management without completing physical therapy first.    If that is the case, he would need to complete PT first and then be re-evaluated following PT to make further recommendations for a pain management consultation if needed at that point.
I would prefer both at the same time, but if not approved through WC will have to do what they allow. 
Note written and faxed to Lisa Pulido. Q[place]a will contact patient to notify.  
Patient of Lexa's that you discussed possible surgery with. Does patient specifically need to do PT and pain management simultaneously or ok to do separately if one is approved first?  
General Sunscreen Counseling: I recommended a broad spectrum sunscreen with a SPF of 30 or higher.  I explained that SPF 30 sunscreens block approximately 97 percent of the sun's harmful rays.  Sunscreens should be applied at least 15 minutes prior to expected sun exposure and then every 2 hours after that as long as sun exposure continues. If swimming or exercising sunscreen should be reapplied every 45 minutes to an hour after getting wet or sweating.  One ounce, or the equivalent of a shot glass full of sunscreen, is adequate to protect the skin not covered by a bathing suit. I also recommended a lip balm with a sunscreen as well. Sun protective clothing can be used in lieu of sunscreen but must be worn the entire time you are exposed to the sun's rays.ABCD,s and E of melanoma reviewed
Detail Level: Detailed

## 2022-08-19 ENCOUNTER — LAB (OUTPATIENT)
Dept: LAB | Facility: HOSPITAL | Age: 62
End: 2022-08-19

## 2022-08-19 ENCOUNTER — OFFICE VISIT (OUTPATIENT)
Dept: FAMILY MEDICINE CLINIC | Facility: CLINIC | Age: 62
End: 2022-08-19

## 2022-08-19 ENCOUNTER — TRANSCRIBE ORDERS (OUTPATIENT)
Dept: ADMINISTRATIVE | Facility: HOSPITAL | Age: 62
End: 2022-08-19

## 2022-08-19 VITALS
TEMPERATURE: 97.8 F | RESPIRATION RATE: 19 BRPM | HEART RATE: 97 BPM | WEIGHT: 210.5 LBS | HEIGHT: 68 IN | OXYGEN SATURATION: 97 % | DIASTOLIC BLOOD PRESSURE: 74 MMHG | BODY MASS INDEX: 31.9 KG/M2 | SYSTOLIC BLOOD PRESSURE: 126 MMHG

## 2022-08-19 DIAGNOSIS — Z00.00 ANNUAL PHYSICAL EXAM: ICD-10-CM

## 2022-08-19 DIAGNOSIS — E11.9 TYPE 2 DIABETES MELLITUS WITHOUT COMPLICATION, WITHOUT LONG-TERM CURRENT USE OF INSULIN: ICD-10-CM

## 2022-08-19 DIAGNOSIS — I25.84 CORONARY ARTERY CALCIFICATION: ICD-10-CM

## 2022-08-19 DIAGNOSIS — I25.10 CORONARY ARTERY CALCIFICATION: ICD-10-CM

## 2022-08-19 DIAGNOSIS — M51.26 HERNIATED NUCLEUS PULPOSUS, L4-5 LEFT: ICD-10-CM

## 2022-08-19 DIAGNOSIS — R91.1 PULMONARY NODULE: ICD-10-CM

## 2022-08-19 DIAGNOSIS — Z13.9 SCREENING DUE: Primary | ICD-10-CM

## 2022-08-19 DIAGNOSIS — Z95.5 STENTED CORONARY ARTERY: ICD-10-CM

## 2022-08-19 DIAGNOSIS — I25.2 HISTORY OF ST ELEVATION MYOCARDIAL INFARCTION (STEMI): ICD-10-CM

## 2022-08-19 DIAGNOSIS — E11.9 TYPE 2 DIABETES MELLITUS WITHOUT COMPLICATION, WITHOUT LONG-TERM CURRENT USE OF INSULIN: Primary | ICD-10-CM

## 2022-08-19 LAB
ALBUMIN SERPL-MCNC: 4.7 G/DL (ref 3.5–5.2)
ALBUMIN/GLOB SERPL: 1.7 G/DL
ALP SERPL-CCNC: 72 U/L (ref 39–117)
ALT SERPL W P-5'-P-CCNC: 53 U/L (ref 1–41)
ANION GAP SERPL CALCULATED.3IONS-SCNC: 14.4 MMOL/L (ref 5–15)
ANISOCYTOSIS BLD QL: ABNORMAL
AST SERPL-CCNC: 23 U/L (ref 1–40)
BILIRUB SERPL-MCNC: 0.3 MG/DL (ref 0–1.2)
BUN SERPL-MCNC: 10 MG/DL (ref 6–20)
BUN/CREAT SERPL: 11.4 (ref 7–25)
BURR CELLS BLD QL SMEAR: ABNORMAL
CALCIUM SPEC-SCNC: 9.4 MG/DL (ref 8.6–10.5)
CHLORIDE SERPL-SCNC: 101 MMOL/L (ref 98–107)
CHOLEST SERPL-MCNC: 141 MG/DL (ref 0–200)
CO2 SERPL-SCNC: 23.6 MMOL/L (ref 22–29)
CREAT SERPL-MCNC: 0.88 MG/DL (ref 0.76–1.27)
DEPRECATED RDW RBC AUTO: 41.5 FL (ref 37–54)
EGFRCR SERPLBLD CKD-EPI 2021: 99.1 ML/MIN/1.73
EOSINOPHIL # BLD MANUAL: 0.38 10*3/MM3 (ref 0–0.4)
EOSINOPHIL NFR BLD MANUAL: 2.2 % (ref 0.3–6.2)
ERYTHROCYTE [DISTWIDTH] IN BLOOD BY AUTOMATED COUNT: 13.5 % (ref 12.3–15.4)
GLOBULIN UR ELPH-MCNC: 2.7 GM/DL
GLUCOSE SERPL-MCNC: 121 MG/DL (ref 65–99)
HBA1C MFR BLD: 6.6 % (ref 4.8–5.6)
HCT VFR BLD AUTO: 43.5 % (ref 37.5–51)
HDLC SERPL-MCNC: 42 MG/DL (ref 40–60)
HGB BLD-MCNC: 15.3 G/DL (ref 13–17.7)
LDLC SERPL CALC-MCNC: 70 MG/DL (ref 0–100)
LDLC/HDLC SERPL: 1.54 {RATIO}
LYMPHOCYTES # BLD MANUAL: 5.26 10*3/MM3 (ref 0.7–3.1)
LYMPHOCYTES NFR BLD MANUAL: 11.8 % (ref 5–12)
MCH RBC QN AUTO: 30.5 PG (ref 26.6–33)
MCHC RBC AUTO-ENTMCNC: 35.2 G/DL (ref 31.5–35.7)
MCV RBC AUTO: 86.7 FL (ref 79–97)
MONOCYTES # BLD: 2.06 10*3/MM3 (ref 0.1–0.9)
NEUTROPHILS # BLD AUTO: 9.78 10*3/MM3 (ref 1.7–7)
NEUTROPHILS NFR BLD MANUAL: 55.9 % (ref 42.7–76)
PLAT MORPH BLD: NORMAL
PLATELET # BLD AUTO: 432 10*3/MM3 (ref 140–450)
PMV BLD AUTO: 9.6 FL (ref 6–12)
POIKILOCYTOSIS BLD QL SMEAR: ABNORMAL
POTASSIUM SERPL-SCNC: 4.2 MMOL/L (ref 3.5–5.2)
PROT SERPL-MCNC: 7.4 G/DL (ref 6–8.5)
RBC # BLD AUTO: 5.02 10*6/MM3 (ref 4.14–5.8)
SODIUM SERPL-SCNC: 139 MMOL/L (ref 136–145)
TARGETS BLD QL SMEAR: ABNORMAL
TRIGL SERPL-MCNC: 171 MG/DL (ref 0–150)
TSH SERPL DL<=0.05 MIU/L-ACNC: 3.67 UIU/ML (ref 0.27–4.2)
VARIANT LYMPHS NFR BLD MANUAL: 30.1 % (ref 19.6–45.3)
VLDLC SERPL-MCNC: 29 MG/DL (ref 5–40)
WBC MORPH BLD: NORMAL
WBC NRBC COR # BLD: 17.49 10*3/MM3 (ref 3.4–10.8)

## 2022-08-19 PROCEDURE — 99214 OFFICE O/P EST MOD 30 MIN: CPT | Performed by: STUDENT IN AN ORGANIZED HEALTH CARE EDUCATION/TRAINING PROGRAM

## 2022-08-19 PROCEDURE — 80050 GENERAL HEALTH PANEL: CPT

## 2022-08-19 PROCEDURE — 80061 LIPID PANEL: CPT

## 2022-08-19 PROCEDURE — 83036 HEMOGLOBIN GLYCOSYLATED A1C: CPT

## 2022-08-19 PROCEDURE — 85007 BL SMEAR W/DIFF WBC COUNT: CPT

## 2022-08-19 PROCEDURE — 36415 COLL VENOUS BLD VENIPUNCTURE: CPT

## 2022-08-19 RX ORDER — ATORVASTATIN CALCIUM 40 MG/1
40 TABLET, FILM COATED ORAL NIGHTLY
Qty: 90 TABLET | Refills: 1 | Status: SHIPPED | OUTPATIENT
Start: 2022-08-19 | End: 2023-03-22 | Stop reason: SDUPTHER

## 2022-08-19 RX ORDER — HYDROCODONE BITARTRATE AND ACETAMINOPHEN 10; 325 MG/1; MG/1
1 TABLET ORAL EVERY 8 HOURS PRN
Qty: 40 TABLET | Refills: 0 | Status: SHIPPED | OUTPATIENT
Start: 2022-08-19 | End: 2022-09-14 | Stop reason: SDUPTHER

## 2022-08-19 RX ORDER — CYCLOBENZAPRINE HCL 10 MG
10 TABLET ORAL 2 TIMES DAILY PRN
Qty: 30 TABLET | Refills: 0 | Status: SHIPPED | OUTPATIENT
Start: 2022-08-19 | End: 2022-09-14 | Stop reason: SDUPTHER

## 2022-08-19 NOTE — PROGRESS NOTES
"Chief Complaint  Following up on diabetes/pulmonary nodule and cardiac disease    Subjective         Nba Gaffney is a 61 y.o. male who presents to [facility]    61 years old male comes to the clinic today to follow-up.    Diabetes type 2; new blood work needed, last A1c was 7.1.  Patient is taking metformin and following good diet.    Cardiac disease/stented coronary artery; asymptomatic, taking aspirin/beta-blocker and statin.    Pulmonary nodule; repeat chest CT needed, patient to call if he does not hear anything from scheduling.    Denies any chest pain or shortness of breath on exertion.        Objective   Vital Signs:   Vitals:    08/19/22 0950   BP: 126/74   Pulse: 97   Resp: 19   Temp: 97.8 °F (36.6 °C)   SpO2: 97%   Weight: 95.5 kg (210 lb 8 oz)   Height: 172.7 cm (68\")      Body mass index is 32.01 kg/m².   Wt Readings from Last 3 Encounters:   08/19/22 95.5 kg (210 lb 8 oz)   07/28/22 94.8 kg (209 lb)   02/14/22 102 kg (224 lb)      BP Readings from Last 3 Encounters:   08/19/22 126/74   07/28/22 107/82   02/14/22 129/72        Patient Care Team:  Eugene Campos MD as PCP - General (Family Medicine)     Physical Exam  Vitals reviewed.   Constitutional:       Appearance: Normal appearance. He is well-developed.   HENT:      Head: Normocephalic and atraumatic.      Right Ear: External ear normal.      Left Ear: External ear normal.      Mouth/Throat:      Pharynx: No oropharyngeal exudate.   Eyes:      Conjunctiva/sclera: Conjunctivae normal.      Pupils: Pupils are equal, round, and reactive to light.   Cardiovascular:      Rate and Rhythm: Normal rate and regular rhythm.      Heart sounds: No murmur heard.    No friction rub. No gallop.   Pulmonary:      Effort: Pulmonary effort is normal.      Breath sounds: Normal breath sounds. No wheezing or rhonchi.   Abdominal:      General: Bowel sounds are normal. There is no distension.      Palpations: Abdomen is soft.      " Tenderness: There is no abdominal tenderness.   Skin:     General: Skin is warm and dry.   Neurological:      Mental Status: He is alert and oriented to person, place, and time.      Cranial Nerves: No cranial nerve deficit.   Psychiatric:         Mood and Affect: Mood and affect normal.         Behavior: Behavior normal.         Thought Content: Thought content normal.         Judgment: Judgment normal.                 Assessment and Plan   Diagnoses and all orders for this visit:    1. Type 2 diabetes mellitus without complication, without long-term current use of insulin (HCC) (Primary)  Comments:  Repeat blood work needed, patient is taking metformin.  Lifestyle modification discussed again.    2. History of ST elevation myocardial infarction (STEMI)    3. Stented coronary artery  Comments:  Patient is only taking aspirin, told by cardiologist to stop Brilinta after 1 year as per patient.  Asymptomatic    4. Pulmonary nodule  Comments:  Chest CT was ordered to repeat this month but not scheduled yet. Nurses (juan and Merna) working to schedule it.    5. Coronary artery calcification  Comments:  Continue with Lipitor and daily exercise          Tobacco Use: High Risk   • Smoking Tobacco Use: Current Every Day Smoker   • Smokeless Tobacco Use: Never Used            Follow Up   Return in about 6 months (around 2/19/2023).  Patient was given instructions and counseling regarding his condition or for health maintenance advice. Please see specific information pulled into the AVS if appropriate.

## 2022-08-23 DIAGNOSIS — D72.828 OTHER ELEVATED WHITE BLOOD CELL (WBC) COUNT: Primary | ICD-10-CM

## 2022-08-25 ENCOUNTER — OFFICE VISIT (OUTPATIENT)
Dept: NEUROSURGERY | Facility: CLINIC | Age: 62
End: 2022-08-25

## 2022-08-25 VITALS
SYSTOLIC BLOOD PRESSURE: 133 MMHG | DIASTOLIC BLOOD PRESSURE: 73 MMHG | HEIGHT: 68 IN | WEIGHT: 209 LBS | HEART RATE: 85 BPM | BODY MASS INDEX: 31.67 KG/M2

## 2022-08-25 DIAGNOSIS — M51.26 HERNIATED NUCLEUS PULPOSUS, L4-5 LEFT: Primary | ICD-10-CM

## 2022-08-25 DIAGNOSIS — M54.41 CHRONIC MIDLINE LOW BACK PAIN WITH RIGHT-SIDED SCIATICA: ICD-10-CM

## 2022-08-25 DIAGNOSIS — G89.29 CHRONIC MIDLINE LOW BACK PAIN WITH RIGHT-SIDED SCIATICA: ICD-10-CM

## 2022-08-25 DIAGNOSIS — M47.816 FACET ARTHROPATHY, LUMBAR: ICD-10-CM

## 2022-08-25 PROCEDURE — 99213 OFFICE O/P EST LOW 20 MIN: CPT | Performed by: PHYSICIAN ASSISTANT

## 2022-08-25 NOTE — PROGRESS NOTES
Patient being seen for today for Follow-up  .    Subjective    Nba Gaffney is a 61 y.o. male that presents with Follow-up  .    HPI  Previously: Last seen on 7/28/2022 for his worst pain in the right leg to the just below the knee, he had severe stenosis of the central canal at L2-L3, he did discuss possible surgical approach with Dr. Padron at that visit, who recommended trial of physical therapy and injection therapy prior to surgical intervention but would consider L2-L3 discectomy if conservative treatment failed.  He was referred for both physical therapy and pain management consultation.  There was plan to have him follow-up in 4 weeks to reassess.    Today: He did complete 4 physical therapy visits, which has been helpful.    He also completed a #1 epidural injection about 1 weeks ago which has been significantly helpful.    He does have have pain in the back and right leg still, but he rates this 5/10 today.    He is taking Norco, but has reduced his use. It does seem to help.    He denies any new complaints today.     reports that he has been smoking cigarettes. He has a 23.00 pack-year smoking history. He has never used smokeless tobacco.    Review of Systems   Musculoskeletal: Positive for back pain.   Neurological: Negative for weakness and numbness.       Objective   Vitals:    08/25/22 0821   BP: 133/73   Pulse: 85        Physical Exam  Constitutional:       Appearance: Normal appearance. He is obese.   Pulmonary:      Effort: Pulmonary effort is normal.   Musculoskeletal:         General: No tenderness.      Comments: SLR on the right causes mild pain into the right upper thigh/groin area   Neurological:      General: No focal deficit present.      Mental Status: He is alert and oriented to person, place, and time.      Sensory: No sensory deficit.      Motor: No weakness.      Deep Tendon Reflexes: Reflexes normal.   Psychiatric:         Mood and Affect: Mood normal.         Behavior: Behavior  normal.          Result Review   None.     Assessment and Plan {CC Problem List  Visit Diagnosis  ROS  Review (Popup)  Health Maintenance  Quality  BestPractice  Medications  SmartSets  SnapShot Encounters  Media :23}   Diagnoses and all orders for this visit:    1. Herniated nucleus pulposus, L4-5 left (Primary)    2. Facet arthropathy, lumbar    3. Chronic midline low back pain with right-sided sciatica    He is improved today after some PT visits and especially the #1 spinal epidural about 1 weeks ago.    He does plan to complete another spinal epidural when possible, considering his relief with the #1 injection, I certainly do agree that he should continue with his injection trial and then maintenance injections as long as they are helpful.    He is also going to continue with physical therapy at this time.    He does still have some pain and he wants to try to get back to work starting 8/29/22, but considering his continued pain we are going to recommend physical restrictions for the time being.    I am recommending the following restrictions: No heavy lifting greater than 10 lb, limited use a ladder as this worsens his pain, limit twisting and bending at the waist, avoidance of strenuous or jarring activity to the spine.    He is going to continue the Norco PRN for now.    The patient was counseled on basic recommendations for the reduction and prevention of back, neck, or spine pain in association with spinal disorders, including: cessation/avoidance of nicotine use, maintenance of a healthy BMI and weight, focusing on building/maintaining core strength through core exercise, and avoidance of activities which worsen the pain. The patient will monitor for changes in symptoms and notify our clinic of these changes as needed.    He is going to follow-up here after his next spinal epidural injection, he will call to schedule that appointment once he knows when his next injection is going to  be.  Follow Up {Instructions Charge Capture  Follow-up Communications :23}   Return for Recheck, he will contact to schedule after his next spinal epidural.

## 2022-08-26 ENCOUNTER — LAB (OUTPATIENT)
Dept: LAB | Facility: HOSPITAL | Age: 62
End: 2022-08-26

## 2022-08-26 DIAGNOSIS — D72.828 OTHER ELEVATED WHITE BLOOD CELL (WBC) COUNT: ICD-10-CM

## 2022-08-26 LAB
ANISOCYTOSIS BLD QL: ABNORMAL
DEPRECATED RDW RBC AUTO: 43.5 FL (ref 37–54)
ELLIPTOCYTES BLD QL SMEAR: ABNORMAL
EOSINOPHIL # BLD MANUAL: 0.27 10*3/MM3 (ref 0–0.4)
EOSINOPHIL NFR BLD MANUAL: 2 % (ref 0.3–6.2)
ERYTHROCYTE [DISTWIDTH] IN BLOOD BY AUTOMATED COUNT: 13.6 % (ref 12.3–15.4)
HCT VFR BLD AUTO: 41.4 % (ref 37.5–51)
HGB BLD-MCNC: 13.9 G/DL (ref 13–17.7)
LYMPHOCYTES # BLD MANUAL: 5.3 10*3/MM3 (ref 0.7–3.1)
LYMPHOCYTES NFR BLD MANUAL: 10.2 % (ref 5–12)
MCH RBC QN AUTO: 29.8 PG (ref 26.6–33)
MCHC RBC AUTO-ENTMCNC: 33.6 G/DL (ref 31.5–35.7)
MCV RBC AUTO: 88.7 FL (ref 79–97)
MICROCYTES BLD QL: ABNORMAL
MONOCYTES # BLD: 1.36 10*3/MM3 (ref 0.1–0.9)
NEUTROPHILS # BLD AUTO: 6.39 10*3/MM3 (ref 1.7–7)
NEUTROPHILS NFR BLD MANUAL: 48 % (ref 42.7–76)
PLAT MORPH BLD: NORMAL
PLATELET # BLD AUTO: 421 10*3/MM3 (ref 140–450)
PMV BLD AUTO: 9.7 FL (ref 6–12)
POIKILOCYTOSIS BLD QL SMEAR: ABNORMAL
RBC # BLD AUTO: 4.67 10*6/MM3 (ref 4.14–5.8)
VARIANT LYMPHS NFR BLD MANUAL: 39.8 % (ref 19.6–45.3)
WBC MORPH BLD: NORMAL
WBC NRBC COR # BLD: 13.31 10*3/MM3 (ref 3.4–10.8)

## 2022-08-26 PROCEDURE — 85025 COMPLETE CBC W/AUTO DIFF WBC: CPT

## 2022-08-26 PROCEDURE — 36415 COLL VENOUS BLD VENIPUNCTURE: CPT

## 2022-08-26 PROCEDURE — 85007 BL SMEAR W/DIFF WBC COUNT: CPT

## 2022-08-30 DIAGNOSIS — D72.821 MONOCYTOSIS: Primary | ICD-10-CM

## 2022-09-01 ENCOUNTER — TELEPHONE (OUTPATIENT)
Dept: FAMILY MEDICINE CLINIC | Facility: CLINIC | Age: 62
End: 2022-09-01

## 2022-09-01 DIAGNOSIS — F17.200 SMOKER: Primary | ICD-10-CM

## 2022-09-01 NOTE — TELEPHONE ENCOUNTER
Margaret from [facility] financial clearance is needing the order for CT Chest low Dose Wo signed in Epic.

## 2022-09-09 ENCOUNTER — CONSULT (OUTPATIENT)
Dept: ONCOLOGY | Facility: HOSPITAL | Age: 62
End: 2022-09-09

## 2022-09-09 ENCOUNTER — LAB (OUTPATIENT)
Dept: ONCOLOGY | Facility: HOSPITAL | Age: 62
End: 2022-09-09

## 2022-09-09 VITALS
WEIGHT: 218.7 LBS | TEMPERATURE: 98.4 F | DIASTOLIC BLOOD PRESSURE: 69 MMHG | RESPIRATION RATE: 18 BRPM | BODY MASS INDEX: 33.25 KG/M2 | OXYGEN SATURATION: 97 % | SYSTOLIC BLOOD PRESSURE: 124 MMHG | HEART RATE: 83 BPM

## 2022-09-09 DIAGNOSIS — D72.821 MONOCYTOSIS: Primary | ICD-10-CM

## 2022-09-09 PROCEDURE — 99204 OFFICE O/P NEW MOD 45 MIN: CPT | Performed by: INTERNAL MEDICINE

## 2022-09-09 PROCEDURE — G0463 HOSPITAL OUTPT CLINIC VISIT: HCPCS | Performed by: INTERNAL MEDICINE

## 2022-09-09 NOTE — PROGRESS NOTES
Chief Complaint  monocytosis    Eugene Campos MD Patel, Jahin, MD    Subjective          Nba Gaffney presents to [facility] for monocytosis with leukocytosis.    Patient reports he feels well overall. He has no acute complaints. He states he has never been told he has a high white blood cell count. Patient with recent back injury at work in July. He went to pain management for shots and has recovered from this. He did have heart attack in July of 2021 and remains on aspirin for this. He currently works full time. He does smoke about a pack per day and has done so for several years.  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    Patient's prior CBCs reviewed patient has a chronic history of leukocytosis with a range of 10,000-17,000.  Labs go back to January 2021.  Differential showing lymphocytosis with a mild monocytosis.  Hemoglobin and platelets have always been normal.  CBC from August 26, 2020 show white blood cell count 13.3 thousand hemoglobin 13.9 g/dL and platelets of 421,000 absolute lymphocytes 5.3 and absolute monocytes 1.3.      Review of Systems   Review of systems negative for fevers chills weight loss night sweats.  Negative for diarrhea or abdominal pain.  Negative for rash.  Negative for chest pain or trouble breathing.    Current Outpatient Medications on File Prior to Visit   Medication Sig Dispense Refill   • aspirin (Aspirin Low Dose) 81 MG EC tablet Take 1 tablet by mouth Daily. 90 tablet 11   • atorvastatin (LIPITOR) 40 MG tablet Take 1 tablet by mouth Every Night. 90 tablet 1   • cyclobenzaprine (FLEXERIL) 10 MG tablet Take 1 tablet by mouth 2 (Two) Times a Day As Needed for Muscle Spasms. 30 tablet 0   • HYDROcodone-acetaminophen (NORCO)  MG per tablet Take 1 tablet by mouth Every 8 (Eight) Hours As Needed for  Severe Pain . 40 tablet 0   • metFORMIN (Glucophage) 850 MG tablet Take 1 tablet by mouth 2 (Two) Times a Day With Meals. 180 tablet 1   • metoprolol succinate XL (TOPROL-XL) 25 MG 24 hr tablet Take 1 tablet by mouth Daily. 30 tablet 3   • sodium-potassium-magnesium sulfates (Suprep Bowel Prep Kit) 17.5-3.13-1.6 GM/177ML solution oral solution Take 2 bottles by mouth Every 12 (Twelve) Hours. Take as directed by prescriber's office 177 mL 0     No current facility-administered medications on file prior to visit.       No Known Allergies  Past Medical History:   Diagnosis Date   • Environmental allergies     SEASONAL ALLERGIES   • Hyperlipidemia    • Hypertension    • Incisional hernia 2008   • Lumbago     LOW BACK PAIN   • Lumbar disc herniation 03/17/2014    L4-5   • Lumbar spinal stenosis 03/17/2014   • MI (myocardial infarction) ([facility]) 07/28/2021    SURGERY - 2 STENTS PLACED  7-2021- SEE'S DR Gonzales, DENIED  CP/SOB   • Shoulder pain, left      Past Surgical History:   Procedure Laterality Date   • CARDIAC CATHETERIZATION N/A 7/29/2021    Procedure: Left Heart Cath;  Surgeon: Yulissa Gonzales MD;  Location: [facility] CATH INVASIVE LOC[facility];  Service: Cardiovascular;  Laterality: N/A;   • CARDIAC CATHETERIZATION N/A 7/29/2021    Procedure: Coronary angiography;  Surgeon: Yulissa Gonzales MD;  Location: [facility] CATH INVASIVE LOCATION;  Service: Cardiovascular;  Laterality: N/A;   • CARDIAC CATHETERIZATION N/A 7/29/2021    Procedure: Stent KAYY coronary;  Surgeon: Yulissa Gonzales MD;  Location: [facility] CATH INVASIVE LOCATION;  Service: Cardiovascular;  Laterality: N/A;   • CARDIAC CATHETERIZATION N/A 7/29/2021    Procedure: Left ventriculography;  Surgeon: Yulissa Gonzales MD;  Location: [facility] CATH INVASIVE LOCATION;  Service: Cardiovascular;  Laterality: N/A;   • CARDIAC CATHETERIZATION  7/29/2021    Procedure: Percutaneous Manual Thrombectomy - [place];  Surgeon: Yulissa Gonzales MD;   Location: [facility] CATH INVASIVE LOCATION;  Service: Cardiovascular;;   • ELBOW PROCEDURE Right    • FEMUR FRACTURE SURGERY     • HIP SURGERY Left    • LEG SURGERY     • PERIPHERAL ARTERIAL STENT GRAFT     • SHOULDER ARTHROSCOPY W/ ROTATOR CUFF REPAIR Left 1/15/2021    Procedure: SHOULDER ARTHROSCOPY WITH ROTATOR CUFF REPAIR;  Surgeon: Noah Shaw MD;  Location: [facility] OR [facility];  Service: Orthopedics;  Laterality: Left;   • SHOULDER SURGERY Right    • SPLENECTOMY     • WRIST SURGERY Right     wrist orif     Social History     Socioeconomic History   • Marital status:    Tobacco Use   • Smoking status: Current Every Day Smoker     Packs/day: 0.50     Years: 46.00     Pack years: 23.00     Types: Cigarettes   • Smokeless tobacco: Never Used   Vaping Use   • Vaping Use: Never used   Substance and Sexual Activity   • Alcohol use: Not Currently   • Drug use: Not Currently   • Sexual activity: Defer     Family History   Problem Relation Age of Onset   • Malig Hyperthermia Neg Hx        Objective   Physical Exam   Patient resting in chair.  Mask in place.  Appears comfortable and of stated age.  Anicteric sclera.  Extraocular movements intact.  No rash on exposed skin.  Abdomen soft and nontender.  No splenomegaly.  Speech intact awake alert and oriented x4.  No gross neurologic defect noted.    Vitals:    09/09/22 1525   BP: 124/69   Pulse: 83   Resp: 18   Temp: 98.4 °F (36.9 °C)   SpO2: 97%   Weight: 99.2 kg (218 lb 11.1 oz)   PainSc: 0-No pain     ECOG score: 0         PHQ-9 Total Score:         The patient is not  experiencing fatigue.             Result Review :   The following data was reviewed by: Yuval Snider MD on 09/09/2022:  Lab Results   Component Value Date    HGB 13.9 08/26/2022    HCT 41.4 08/26/2022    MCV 88.7 08/26/2022     08/26/2022    WBC 13.31 (H) 08/26/2022    NEUTROABS 6.39 08/26/2022    LYMPHSABS 3.83 (H) 01/20/2022    MONOSABS 0.77 01/20/2022    EOSABS 0.27 08/26/2022     BASOSABS 0.17 01/20/2022     Lab Results   Component Value Date    GLUCOSE 121 (H) 08/19/2022    BUN 10 08/19/2022    CREATININE 0.88 08/19/2022     08/19/2022    K 4.2 08/19/2022     08/19/2022    CO2 23.6 08/19/2022    CALCIUM 9.4 08/19/2022    PROTEINTOT 7.4 08/19/2022    ALBUMIN 4.70 08/19/2022    BILITOT 0.3 08/19/2022    ALKPHOS 72 08/19/2022    AST 23 08/19/2022    ALT 53 (H) 08/19/2022     Lab Results   Component Value Date    MG 2.1 07/29/2021    TSH 3.670 08/19/2022               Assessment and Plan    Diagnoses and all orders for this visit:    1. Monocytosis (Primary)  -     Flow Cytometry, Blood; Future  -     PERIPHERAL SMEAR, [facility]; Future  -     CBC & Differential; Future    Patient with longstanding mild leukocytosis.  As of 8/26/22 white blood cell count 13.3K.  He has evidence of lymphocytosis and monocytosis.  Patient is a tobacco smoker which is a known cause of leukocytosis.  Patient also had a splenectomy in 2001 due to traumatic fall.  Being asplenic is a known cause of leukocytosis and monocytosis as well.  This was discussed with patient who was in agreement. However due to an absolute lymphocytosis about 5000 we will order a flow cytometry with peripheral blood smear.  We will have him follow-up in 2 weeks for these results.        Patient Follow Up: 2-3 weeks for lab review  Patient was given instructions and counseling regarding his condition or for health maintenance advice. Please see specific information pulled into the AVS if appropriate.     Electronically signed by Yuval Snider MD, 09/09/22, 3:59 PM EDT.

## 2022-09-10 ENCOUNTER — LAB (OUTPATIENT)
Dept: LAB | Facility: HOSPITAL | Age: 62
End: 2022-09-10

## 2022-09-10 DIAGNOSIS — D72.821 MONOCYTOSIS: ICD-10-CM

## 2022-09-10 LAB
BASOPHILS # BLD AUTO: 0.13 10*3/MM3 (ref 0–0.2)
BASOPHILS NFR BLD AUTO: 1.2 % (ref 0–1.5)
DEPRECATED RDW RBC AUTO: 47.3 FL (ref 37–54)
EOSINOPHIL # BLD AUTO: 0.24 10*3/MM3 (ref 0–0.4)
EOSINOPHIL NFR BLD AUTO: 2.2 % (ref 0.3–6.2)
ERYTHROCYTE [DISTWIDTH] IN BLOOD BY AUTOMATED COUNT: 14.6 % (ref 12.3–15.4)
HCT VFR BLD AUTO: 41.8 % (ref 37.5–51)
HGB BLD-MCNC: 14.4 G/DL (ref 13–17.7)
IMM GRANULOCYTES # BLD AUTO: 0.03 10*3/MM3 (ref 0–0.05)
IMM GRANULOCYTES NFR BLD AUTO: 0.3 % (ref 0–0.5)
LYMPHOCYTES # BLD AUTO: 4.38 10*3/MM3 (ref 0.7–3.1)
LYMPHOCYTES NFR BLD AUTO: 40.5 % (ref 19.6–45.3)
MCH RBC QN AUTO: 30.5 PG (ref 26.6–33)
MCHC RBC AUTO-ENTMCNC: 34.4 G/DL (ref 31.5–35.7)
MCV RBC AUTO: 88.6 FL (ref 79–97)
MONOCYTES # BLD AUTO: 0.88 10*3/MM3 (ref 0.1–0.9)
MONOCYTES NFR BLD AUTO: 8.1 % (ref 5–12)
NEUTROPHILS NFR BLD AUTO: 47.7 % (ref 42.7–76)
NEUTROPHILS NFR BLD AUTO: 5.16 10*3/MM3 (ref 1.7–7)
NRBC BLD AUTO-RTO: 0 /100 WBC (ref 0–0.2)
PATHOLOGY REVIEW: YES
PLATELET # BLD AUTO: 469 10*3/MM3 (ref 140–450)
PMV BLD AUTO: 9 FL (ref 6–12)
RBC # BLD AUTO: 4.72 10*6/MM3 (ref 4.14–5.8)
WBC NRBC COR # BLD: 10.82 10*3/MM3 (ref 3.4–10.8)

## 2022-09-10 PROCEDURE — 36415 COLL VENOUS BLD VENIPUNCTURE: CPT

## 2022-09-10 PROCEDURE — 85025 COMPLETE CBC W/AUTO DIFF WBC: CPT

## 2022-09-12 ENCOUNTER — APPOINTMENT (OUTPATIENT)
Dept: CT IMAGING | Facility: HOSPITAL | Age: 62
End: 2022-09-12

## 2022-09-12 LAB — Lab: NORMAL

## 2022-09-14 ENCOUNTER — TELEPHONE (OUTPATIENT)
Dept: FAMILY MEDICINE CLINIC | Facility: CLINIC | Age: 62
End: 2022-09-14

## 2022-09-14 DIAGNOSIS — M51.26 HERNIATED NUCLEUS PULPOSUS, L4-5 LEFT: Primary | ICD-10-CM

## 2022-09-14 LAB
LAB AP CASE REPORT: NORMAL
LAB AP CLINICAL INFORMATION: NORMAL
PATH REPORT.FINAL DX SPEC: NORMAL
PATH REPORT.GROSS SPEC: NORMAL
REF LAB TEST METHOD: NORMAL

## 2022-09-14 RX ORDER — CYCLOBENZAPRINE HCL 10 MG
10 TABLET ORAL 2 TIMES DAILY PRN
Qty: 30 TABLET | Refills: 0 | Status: SHIPPED | OUTPATIENT
Start: 2022-09-14 | End: 2022-10-18 | Stop reason: SDUPTHER

## 2022-09-14 RX ORDER — HYDROCODONE BITARTRATE AND ACETAMINOPHEN 10; 325 MG/1; MG/1
1 TABLET ORAL EVERY 8 HOURS PRN
Qty: 40 TABLET | Refills: 0 | Status: SHIPPED | OUTPATIENT
Start: 2022-09-14 | End: 2022-10-18 | Stop reason: SDUPTHER

## 2022-09-14 NOTE — TELEPHONE ENCOUNTER
DELETE AFTER REVIEWING: Send the encounter HIGH priority, If patient has less than a 3 day supply. If the patient will run out of medication over the weekend add that information to the additional details line. Send this encounter to the clinical pool.    Caller: ZAIRE FUNES    Relationship: WIFE      Best call back number: [phone]      Requested Prescriptions: HYDROCODONE AND FLEXERIL  Requested Prescriptions      No prescriptions requested or ordered in this encounter        Pharmacy where request should be sent:  [facility]    Additional details provided by patient:     Does the patient have less than a 3 day supply:  [x] Yes  [] No    Nayla Ricardo   09/14/22 11:42 EDT         DELETE AFTER READING TO PATIENT: “Thank you for sharing this information with me. I will send a message to the clinical team. Please allow 48 hours for the clinical staff to follow up on this request.”

## 2022-09-14 NOTE — TELEPHONE ENCOUNTER
Caller: ZAIRE FUNES    Relationship: Emergency Contact    Best call back number: [phone]    Requested Prescriptions:   Requested Prescriptions     Pending Prescriptions Disp Refills   • metFORMIN (Glucophage) 850 MG tablet 180 tablet 1     Sig: Take 1 tablet by mouth 2 (Two) Times a Day With Meals.        Pharmacy where request should be sent: [facility] [place]     Additional details provided by patient: PATIENT IS COMPLETELY OUT OF THIS MEDICATION. HE IS ALSO OUT OF REFILLS. PLEASE SUBMIT NEW PRESCRIPTION TO THE PHARMACY WITH REFILLS ASAP TODAY.    Does the patient have less than a 3 day supply:  [x] Yes  [] No    Pernell Ortega Rep   09/14/22 11:24 EDT

## 2022-09-23 ENCOUNTER — OFFICE VISIT (OUTPATIENT)
Dept: ONCOLOGY | Facility: HOSPITAL | Age: 62
End: 2022-09-23

## 2022-09-23 VITALS
DIASTOLIC BLOOD PRESSURE: 69 MMHG | HEART RATE: 88 BPM | BODY MASS INDEX: 33.22 KG/M2 | WEIGHT: 218.48 LBS | TEMPERATURE: 98.1 F | RESPIRATION RATE: 16 BRPM | OXYGEN SATURATION: 98 % | SYSTOLIC BLOOD PRESSURE: 138 MMHG

## 2022-09-23 DIAGNOSIS — D72.821 MONOCYTOSIS: ICD-10-CM

## 2022-09-23 DIAGNOSIS — D72.829 LEUKOCYTOSIS, UNSPECIFIED TYPE: Primary | ICD-10-CM

## 2022-09-23 PROCEDURE — G0463 HOSPITAL OUTPT CLINIC VISIT: HCPCS | Performed by: NURSE PRACTITIONER

## 2022-09-23 PROCEDURE — 99213 OFFICE O/P EST LOW 20 MIN: CPT | Performed by: NURSE PRACTITIONER

## 2022-09-30 ENCOUNTER — APPOINTMENT (OUTPATIENT)
Dept: CT IMAGING | Facility: HOSPITAL | Age: 62
End: 2022-09-30

## 2022-10-18 DIAGNOSIS — M51.26 HERNIATED NUCLEUS PULPOSUS, L4-5 LEFT: ICD-10-CM

## 2022-10-18 RX ORDER — CYCLOBENZAPRINE HCL 10 MG
10 TABLET ORAL 2 TIMES DAILY PRN
Qty: 30 TABLET | Refills: 0 | Status: SHIPPED | OUTPATIENT
Start: 2022-10-18 | End: 2022-12-02 | Stop reason: SDUPTHER

## 2022-10-18 RX ORDER — HYDROCODONE BITARTRATE AND ACETAMINOPHEN 10; 325 MG/1; MG/1
1 TABLET ORAL EVERY 8 HOURS PRN
Qty: 40 TABLET | Refills: 0 | Status: SHIPPED | OUTPATIENT
Start: 2022-10-18 | End: 2022-12-15

## 2022-10-26 ENCOUNTER — HOSPITAL ENCOUNTER (OUTPATIENT)
Dept: CT IMAGING | Facility: HOSPITAL | Age: 62
Discharge: HOME | End: 2022-10-26
Admitting: STUDENT IN AN ORGANIZED HEALTH CARE EDUCATION/TRAINING PROGRAM

## 2022-10-26 DIAGNOSIS — F17.200 SMOKER: ICD-10-CM

## 2022-10-26 PROCEDURE — 71271 CT THORAX LUNG CANCER SCR C-: CPT

## 2022-10-27 DIAGNOSIS — F17.200 SMOKER: ICD-10-CM

## 2022-10-27 DIAGNOSIS — R91.1 PULMONARY NODULE: Primary | ICD-10-CM

## 2022-11-29 DIAGNOSIS — M51.26 HERNIATED NUCLEUS PULPOSUS, L4-5 LEFT: ICD-10-CM

## 2022-11-30 RX ORDER — METOPROLOL SUCCINATE 25 MG/1
25 TABLET, EXTENDED RELEASE ORAL DAILY
Qty: 30 TABLET | Refills: 3 | Status: CANCELLED | OUTPATIENT
Start: 2022-11-30

## 2022-11-30 RX ORDER — HYDROCODONE BITARTRATE AND ACETAMINOPHEN 10; 325 MG/1; MG/1
1 TABLET ORAL EVERY 8 HOURS PRN
Qty: 40 TABLET | Refills: 0 | OUTPATIENT
Start: 2022-11-30

## 2022-12-01 RX ORDER — METOPROLOL SUCCINATE 25 MG/1
25 TABLET, EXTENDED RELEASE ORAL DAILY
Qty: 30 TABLET | Refills: 3 | Status: SHIPPED | OUTPATIENT
Start: 2022-12-01

## 2022-12-02 DIAGNOSIS — M51.26 HERNIATED NUCLEUS PULPOSUS, L4-5 LEFT: ICD-10-CM

## 2022-12-02 RX ORDER — CYCLOBENZAPRINE HCL 10 MG
10 TABLET ORAL 2 TIMES DAILY PRN
Qty: 30 TABLET | Refills: 0 | Status: SHIPPED | OUTPATIENT
Start: 2022-12-02 | End: 2022-12-14 | Stop reason: SDUPTHER

## 2022-12-02 NOTE — TELEPHONE ENCOUNTER
Patient called to request med refills. Advised it has been over three months since seen in office so we are unable to send norco, but can refill cyclobenzaprine. Pharmacy verified.

## 2022-12-08 ENCOUNTER — TELEPHONE (OUTPATIENT)
Dept: NEUROSURGERY | Facility: CLINIC | Age: 62
End: 2022-12-08

## 2022-12-08 NOTE — TELEPHONE ENCOUNTER
Caller: Nba Gaffney    Relationship to patient: Self    Best call back number: [phone]    Patient is needing:    PATIENT CALLED BECAUSE HE IS TRYING TO GET HIS SECOND EPIDURAL INJECTION FROM [facility], BUT [facility] IS TELLING HIM THEY CANNOT DO IT UNTIL A SECOND ORDER IS REC'D FROM DR SAAB'S OFFICE.    PATIENT ALSO WOULD LIKE TO SCHEDULE A F/U WITH DR SAAB OR QUAN, SO THAT HE CAN GET A NEW SCRIPT FOR [place].  BUT IT WAS NOTED THAT MAGY STATED TO RETURN TO OFFICE AFTER SECOND INJECTION.    PATIENT STARTING A NEW JOB SOON AND WOULD LIKE TO GET THIS ALL TAKEN CARE OF BEFORE.    PLEASE CALL TO ADVISE

## 2022-12-09 ENCOUNTER — TELEPHONE (OUTPATIENT)
Dept: NEUROLOGY | Facility: CLINIC | Age: 62
End: 2022-12-09

## 2022-12-09 NOTE — TELEPHONE ENCOUNTER
CALLED PT TO SCHEDULE A FOLLOW UP VISIT WITH BARRETT LYNCH, PT PHONE DISCONNECTED. OKAY FOR HUB TO SCHEDULE

## 2022-12-14 ENCOUNTER — OFFICE VISIT (OUTPATIENT)
Dept: NEUROSURGERY | Facility: CLINIC | Age: 62
End: 2022-12-14

## 2022-12-14 VITALS
SYSTOLIC BLOOD PRESSURE: 159 MMHG | WEIGHT: 220 LBS | DIASTOLIC BLOOD PRESSURE: 96 MMHG | HEART RATE: 97 BPM | BODY MASS INDEX: 33.34 KG/M2 | HEIGHT: 68 IN

## 2022-12-14 DIAGNOSIS — M54.41 CHRONIC MIDLINE LOW BACK PAIN WITH RIGHT-SIDED SCIATICA: ICD-10-CM

## 2022-12-14 DIAGNOSIS — M51.26 HERNIATED NUCLEUS PULPOSUS, L4-5 LEFT: Primary | ICD-10-CM

## 2022-12-14 DIAGNOSIS — G89.29 CHRONIC MIDLINE LOW BACK PAIN WITH RIGHT-SIDED SCIATICA: ICD-10-CM

## 2022-12-14 DIAGNOSIS — M47.816 FACET ARTHROPATHY, LUMBAR: ICD-10-CM

## 2022-12-14 PROBLEM — M25.512 SHOULDER PAIN, LEFT: Status: ACTIVE | Noted: 2022-12-14

## 2022-12-14 PROBLEM — I21.9 MI (MYOCARDIAL INFARCTION): Status: ACTIVE | Noted: 2021-07-28

## 2022-12-14 PROBLEM — M54.50 LUMBAGO: Status: ACTIVE | Noted: 2022-12-14

## 2022-12-14 PROBLEM — Z91.09 ENVIRONMENTAL ALLERGIES: Status: ACTIVE | Noted: 2022-12-14

## 2022-12-14 PROBLEM — I10 HYPERTENSION: Status: ACTIVE | Noted: 2022-12-14

## 2022-12-14 PROBLEM — E78.5 HYPERLIPIDEMIA: Status: ACTIVE | Noted: 2022-12-14

## 2022-12-14 PROCEDURE — 99214 OFFICE O/P EST MOD 30 MIN: CPT | Performed by: PHYSICIAN ASSISTANT

## 2022-12-14 RX ORDER — CYCLOBENZAPRINE HCL 10 MG
10 TABLET ORAL 2 TIMES DAILY PRN
Qty: 30 TABLET | Refills: 5 | Status: SHIPPED | OUTPATIENT
Start: 2022-12-14

## 2022-12-14 NOTE — PROGRESS NOTES
Patient being seen for today for Follow-up  .    Subjective    Nba Gaffney is a 62 y.o. male that presents with Follow-up  .    HPI  Previously: Last seen on 8/25/2022 for improved back pain after physical therapy visits and #1 spinal epidural approximately 1 week prior.  There was plan to continue with spinal epidural injections.  He was also going to continue physical therapy at that time.  There was plan to follow-up after his next spinal epidural injection but he was going to call for appointment.    Today: He reports worsened pain in the back and the right leg. The pain goes down to about the knee.    He had previous #1 epidural which was helpful for his pain. There was plan to have a #2 injection, but pain management requested he follow-up with us again for another referral.    He was previously taking Norco up to once a day and this was helpful.    He has been back to his regular work duties and seems to tolerate it ok.    He did complete physical therapy and does not feel that this was significantly helpful.    He denies any new complaints today.     reports that he has been smoking cigarettes. He has a 23.00 pack-year smoking history. He has never used smokeless tobacco.    Review of Systems   Musculoskeletal: Positive for back pain.       Objective   Vitals:    12/14/22 1515   BP: 159/96   Pulse: 97        Physical Exam  Constitutional:       Appearance: Normal appearance. He is obese.   Pulmonary:      Effort: Pulmonary effort is normal.   Musculoskeletal:         General: No tenderness.      Comments: SLR on the right is positive with pain in the right lower back down to the knee   Neurological:      General: No focal deficit present.      Mental Status: He is alert and oriented to person, place, and time.      Sensory: No sensory deficit.      Motor: No weakness.      Deep Tendon Reflexes: Reflexes normal.   Psychiatric:         Mood and Affect: Mood normal.         Behavior: Behavior normal.           Result Review   None.     Assessment and Plan {CC Problem List  Visit Diagnosis  ROS  Review (Popup)  [facility]  Quality  BestPractice  Medications  SmartSets  SnapShot Encounters  Media :23}   Diagnoses and all orders for this visit:    1. Herniated nucleus pulposus, L4-5 left (Primary)    2. Facet arthropathy, lumbar    3. Chronic midline low back pain with right-sided sciatica  -     Ambulatory Referral to Pain Management  -     cyclobenzaprine (FLEXERIL) 10 MG tablet; Take 1 tablet by mouth 2 (Two) Times a Day As Needed for Muscle Spasms.  Dispense: 30 tablet; Refill: 5    He had a #1 TFESI: Right L2/L3, L3/L4 which he reports was helpful. He had planned to continue with injections but reported that pain management needed a new order from neurosurgery for this. Considering his benefit previously, I certainly would expect that continuing with the injections will help his leg pain. I will place a new order to [facility] recommending continuing the injections.    As far as pain medication goes, he is requesitng a refill on the [place] that Dr. Padron previously prescribed. We discussed that he should discuss long-term management of the pain medication with pain management. He did verbalize understanding of this.    I am going to refill the Flexeril 10 mg PO BID.    He is going to follow-up here after his next spinal epidural to reassess, sooner if needed.  Follow Up {Instructions Charge Capture  Follow-up Communications :23}   Return for After #2 lumbar epidural.

## 2022-12-15 DIAGNOSIS — M51.26 HERNIATED NUCLEUS PULPOSUS, L4-5 LEFT: ICD-10-CM

## 2022-12-15 RX ORDER — HYDROCODONE BITARTRATE AND ACETAMINOPHEN 10; 325 MG/1; MG/1
1 TABLET ORAL NIGHTLY PRN
Qty: 20 TABLET | Refills: 0 | Status: SHIPPED | OUTPATIENT
Start: 2022-12-15

## 2023-02-07 ENCOUNTER — TELEPHONE (OUTPATIENT)
Dept: ONCOLOGY | Facility: HOSPITAL | Age: 63
End: 2023-02-07
Payer: COMMERCIAL

## 2023-02-07 NOTE — TELEPHONE ENCOUNTER
Hub to tell:  Called patient with follow up appointment.  No vm available so send a reminder letter to patient.

## 2023-02-27 ENCOUNTER — TELEPHONE (OUTPATIENT)
Dept: ONCOLOGY | Facility: HOSPITAL | Age: 63
End: 2023-02-27
Payer: OTHER MISCELLANEOUS

## 2023-02-27 NOTE — TELEPHONE ENCOUNTER
Hub to tell:  Spoke with patient to r/s.  He started a new job and is waiting for his new insurance to start and will call us to r/s once he has new insurance .

## 2023-03-22 ENCOUNTER — TELEPHONE (OUTPATIENT)
Dept: ONCOLOGY | Facility: HOSPITAL | Age: 63
End: 2023-03-22

## 2023-03-22 RX ORDER — ATORVASTATIN CALCIUM 40 MG/1
40 TABLET, FILM COATED ORAL NIGHTLY
Qty: 90 TABLET | Refills: 1 | Status: SHIPPED | OUTPATIENT
Start: 2023-03-22

## 2023-03-22 NOTE — TELEPHONE ENCOUNTER
Caller: ZAIRE FUNES    Relationship to patient: Emergency Contact    Best call back number: [phone]    Chief complaint: PATIENT NEEDS TO RESCHEDULE     Type of visit: FOLLOW UP    Requested date: 4-28-23 AROUND 8:30     If rescheduling, when is the original appointment: 2-21-23    Additional notes:PLEASE CALL TO ADVISE

## 2023-03-22 NOTE — TELEPHONE ENCOUNTER
Caller: ZAIRE FUNES    Relationship: Emergency Contact    Best call back number: [phone]     Requested Prescriptions:   Requested Prescriptions     Pending Prescriptions Disp Refills   • metFORMIN (Glucophage) 850 MG tablet 180 tablet 1     Sig: Take 1 tablet by mouth 2 (Two) Times a Day With Meals.   • atorvastatin (LIPITOR) 40 MG tablet 90 tablet 1     Sig: Take 1 tablet by mouth Every Night.        Pharmacy where request should be sent: [facility]     Last office visit with prescribing clinician: 8/19/2022   Last telemedicine visit with prescribing clinician: 4/28/2023   Next office visit with prescribing clinician: 4/28/2023     Additional details provided by patient: PATIENT IS OUT OF MEDICATION// PATIENT MADE APPOINTMENT WITH PROVIDER 4-28-23  PATIENT IS REQUESTING A REFILL OR ENOUGH MEDICATION UNTIL APPOINTMENT.    Does the patient have less than a 3 day supply:  [x] Yes  [] No    Would you like a call back once the refill request has been completed: [x] Yes [] No    If the office needs to give you a call back, can they leave a voicemail: [] Yes [x] No    Pernell Taylor Rep   03/22/23 09:31 EDT

## 2023-04-28 ENCOUNTER — LAB (OUTPATIENT)
Dept: LAB | Facility: HOSPITAL | Age: 63
End: 2023-04-28
Payer: COMMERCIAL

## 2023-04-28 ENCOUNTER — OFFICE VISIT (OUTPATIENT)
Dept: ONCOLOGY | Facility: HOSPITAL | Age: 63
End: 2023-04-28
Payer: COMMERCIAL

## 2023-04-28 ENCOUNTER — OFFICE VISIT (OUTPATIENT)
Dept: FAMILY MEDICINE CLINIC | Facility: CLINIC | Age: 63
End: 2023-04-28
Payer: COMMERCIAL

## 2023-04-28 ENCOUNTER — OFFICE VISIT (OUTPATIENT)
Dept: CARDIOLOGY | Facility: CLINIC | Age: 63
End: 2023-04-28
Payer: COMMERCIAL

## 2023-04-28 VITALS
BODY MASS INDEX: 31.93 KG/M2 | RESPIRATION RATE: 19 BRPM | HEIGHT: 68 IN | SYSTOLIC BLOOD PRESSURE: 137 MMHG | WEIGHT: 210.7 LBS | TEMPERATURE: 98.4 F | HEART RATE: 89 BPM | DIASTOLIC BLOOD PRESSURE: 76 MMHG | OXYGEN SATURATION: 96 %

## 2023-04-28 VITALS
HEIGHT: 68 IN | WEIGHT: 213 LBS | BODY MASS INDEX: 32.28 KG/M2 | SYSTOLIC BLOOD PRESSURE: 158 MMHG | DIASTOLIC BLOOD PRESSURE: 68 MMHG

## 2023-04-28 VITALS
SYSTOLIC BLOOD PRESSURE: 139 MMHG | BODY MASS INDEX: 32.01 KG/M2 | DIASTOLIC BLOOD PRESSURE: 82 MMHG | HEART RATE: 80 BPM | OXYGEN SATURATION: 95 % | WEIGHT: 210.54 LBS | TEMPERATURE: 97.6 F | RESPIRATION RATE: 16 BRPM

## 2023-04-28 DIAGNOSIS — E11.9 TYPE 2 DIABETES MELLITUS WITHOUT COMPLICATION, WITHOUT LONG-TERM CURRENT USE OF INSULIN: Primary | ICD-10-CM

## 2023-04-28 DIAGNOSIS — R05.1 ACUTE COUGH: ICD-10-CM

## 2023-04-28 DIAGNOSIS — Z11.59 NEED FOR HEPATITIS C SCREENING TEST: ICD-10-CM

## 2023-04-28 DIAGNOSIS — Z00.00 ANNUAL PHYSICAL EXAM: ICD-10-CM

## 2023-04-28 DIAGNOSIS — Z95.5 STENTED CORONARY ARTERY: ICD-10-CM

## 2023-04-28 DIAGNOSIS — I25.10 CORONARY ARTERY DISEASE INVOLVING NATIVE CORONARY ARTERY OF NATIVE HEART WITHOUT ANGINA PECTORIS: ICD-10-CM

## 2023-04-28 DIAGNOSIS — R91.1 PULMONARY NODULE: ICD-10-CM

## 2023-04-28 DIAGNOSIS — D72.829 LEUKOCYTOSIS, UNSPECIFIED TYPE: Primary | ICD-10-CM

## 2023-04-28 DIAGNOSIS — J02.9 ACUTE PHARYNGITIS, UNSPECIFIED ETIOLOGY: ICD-10-CM

## 2023-04-28 DIAGNOSIS — D72.829 LEUKOCYTOSIS, UNSPECIFIED TYPE: ICD-10-CM

## 2023-04-28 DIAGNOSIS — R06.02 SOB (SHORTNESS OF BREATH): Primary | ICD-10-CM

## 2023-04-28 DIAGNOSIS — E11.9 TYPE 2 DIABETES MELLITUS WITHOUT COMPLICATION, WITHOUT LONG-TERM CURRENT USE OF INSULIN: ICD-10-CM

## 2023-04-28 DIAGNOSIS — I25.2 HISTORY OF ST ELEVATION MYOCARDIAL INFARCTION (STEMI): ICD-10-CM

## 2023-04-28 LAB
ALBUMIN SERPL-MCNC: 4.7 G/DL (ref 3.5–5.2)
ALBUMIN UR-MCNC: 1.4 MG/DL
ALBUMIN/GLOB SERPL: 1.6 G/DL
ALP SERPL-CCNC: 64 U/L (ref 39–117)
ALT SERPL W P-5'-P-CCNC: 26 U/L (ref 1–41)
ANION GAP SERPL CALCULATED.3IONS-SCNC: 10 MMOL/L (ref 5–15)
AST SERPL-CCNC: 25 U/L (ref 1–40)
BACTERIA UR QL AUTO: NORMAL /HPF
BASOPHILS # BLD AUTO: 0.14 10*3/MM3 (ref 0–0.2)
BASOPHILS NFR BLD AUTO: 1.4 % (ref 0–1.5)
BILIRUB SERPL-MCNC: 0.2 MG/DL (ref 0–1.2)
BILIRUB UR QL STRIP: NEGATIVE
BUN SERPL-MCNC: 11 MG/DL (ref 8–23)
BUN/CREAT SERPL: 13.4 (ref 7–25)
CALCIUM SPEC-SCNC: 9.3 MG/DL (ref 8.6–10.5)
CHLORIDE SERPL-SCNC: 103 MMOL/L (ref 98–107)
CHOLEST SERPL-MCNC: 212 MG/DL (ref 0–200)
CLARITY UR: CLEAR
CO2 SERPL-SCNC: 27 MMOL/L (ref 22–29)
COLOR UR: YELLOW
CREAT SERPL-MCNC: 0.82 MG/DL (ref 0.76–1.27)
CREAT UR-MCNC: 45.1 MG/DL
DEPRECATED RDW RBC AUTO: 49.5 FL (ref 37–54)
EGFRCR SERPLBLD CKD-EPI 2021: 100.6 ML/MIN/1.73
EOSINOPHIL # BLD AUTO: 0.39 10*3/MM3 (ref 0–0.4)
EOSINOPHIL NFR BLD AUTO: 4 % (ref 0.3–6.2)
ERYTHROCYTE [DISTWIDTH] IN BLOOD BY AUTOMATED COUNT: 15.1 % (ref 12.3–15.4)
FOLATE SERPL-MCNC: 19.6 NG/ML (ref 4.78–24.2)
GLOBULIN UR ELPH-MCNC: 3 GM/DL
GLUCOSE SERPL-MCNC: 96 MG/DL (ref 65–99)
GLUCOSE UR STRIP-MCNC: NEGATIVE MG/DL
HBA1C MFR BLD: 6.4 % (ref 4.8–5.6)
HCT VFR BLD AUTO: 46.5 % (ref 37.5–51)
HCV AB SER DONR QL: NORMAL
HDLC SERPL-MCNC: 42 MG/DL (ref 40–60)
HGB BLD-MCNC: 15.8 G/DL (ref 13–17.7)
HGB UR QL STRIP.AUTO: NEGATIVE
HIV1+2 AB SER QL: NORMAL
HYALINE CASTS UR QL AUTO: NORMAL /LPF
IMM GRANULOCYTES # BLD AUTO: 0.02 10*3/MM3 (ref 0–0.05)
IMM GRANULOCYTES NFR BLD AUTO: 0.2 % (ref 0–0.5)
KETONES UR QL STRIP: NEGATIVE
LDLC SERPL CALC-MCNC: 153 MG/DL (ref 0–100)
LDLC/HDLC SERPL: 3.61 {RATIO}
LEUKOCYTE ESTERASE UR QL STRIP.AUTO: NEGATIVE
LYMPHOCYTES # BLD AUTO: 3.31 10*3/MM3 (ref 0.7–3.1)
LYMPHOCYTES NFR BLD AUTO: 34.3 % (ref 19.6–45.3)
MCH RBC QN AUTO: 30.4 PG (ref 26.6–33)
MCHC RBC AUTO-ENTMCNC: 34 G/DL (ref 31.5–35.7)
MCV RBC AUTO: 89.4 FL (ref 79–97)
MICROALBUMIN/CREAT UR: 31 MG/G
MONOCYTES # BLD AUTO: 0.95 10*3/MM3 (ref 0.1–0.9)
MONOCYTES NFR BLD AUTO: 9.8 % (ref 5–12)
NEUTROPHILS NFR BLD AUTO: 4.85 10*3/MM3 (ref 1.7–7)
NEUTROPHILS NFR BLD AUTO: 50.3 % (ref 42.7–76)
NITRITE UR QL STRIP: NEGATIVE
NRBC BLD AUTO-RTO: 0 /100 WBC (ref 0–0.2)
PH UR STRIP.AUTO: 6 [PH] (ref 5–8)
PLATELET # BLD AUTO: 394 10*3/MM3 (ref 140–450)
PMV BLD AUTO: 9.1 FL (ref 6–12)
POTASSIUM SERPL-SCNC: 4.4 MMOL/L (ref 3.5–5.2)
PROT SERPL-MCNC: 7.7 G/DL (ref 6–8.5)
PROT UR QL STRIP: NEGATIVE
RBC # BLD AUTO: 5.2 10*6/MM3 (ref 4.14–5.8)
RBC # UR STRIP: NORMAL /HPF
REF LAB TEST METHOD: NORMAL
SODIUM SERPL-SCNC: 140 MMOL/L (ref 136–145)
SP GR UR STRIP: 1.01 (ref 1–1.03)
SQUAMOUS #/AREA URNS HPF: NORMAL /HPF
TRIGL SERPL-MCNC: 92 MG/DL (ref 0–150)
TSH SERPL DL<=0.05 MIU/L-ACNC: 1.97 UIU/ML (ref 0.27–4.2)
UROBILINOGEN UR QL STRIP: NORMAL
VIT B12 BLD-MCNC: 520 PG/ML (ref 211–946)
VLDLC SERPL-MCNC: 17 MG/DL (ref 5–40)
WBC # UR STRIP: NORMAL /HPF
WBC NRBC COR # BLD: 9.66 10*3/MM3 (ref 3.4–10.8)

## 2023-04-28 PROCEDURE — 36415 COLL VENOUS BLD VENIPUNCTURE: CPT

## 2023-04-28 PROCEDURE — 86803 HEPATITIS C AB TEST: CPT

## 2023-04-28 PROCEDURE — G0432 EIA HIV-1/HIV-2 SCREEN: HCPCS | Performed by: STUDENT IN AN ORGANIZED HEALTH CARE EDUCATION/TRAINING PROGRAM

## 2023-04-28 PROCEDURE — 82570 ASSAY OF URINE CREATININE: CPT | Performed by: STUDENT IN AN ORGANIZED HEALTH CARE EDUCATION/TRAINING PROGRAM

## 2023-04-28 PROCEDURE — 81001 URINALYSIS AUTO W/SCOPE: CPT | Performed by: STUDENT IN AN ORGANIZED HEALTH CARE EDUCATION/TRAINING PROGRAM

## 2023-04-28 PROCEDURE — 82043 UR ALBUMIN QUANTITATIVE: CPT | Performed by: STUDENT IN AN ORGANIZED HEALTH CARE EDUCATION/TRAINING PROGRAM

## 2023-04-28 PROCEDURE — 80061 LIPID PANEL: CPT

## 2023-04-28 PROCEDURE — 82607 VITAMIN B-12: CPT

## 2023-04-28 PROCEDURE — 83036 HEMOGLOBIN GLYCOSYLATED A1C: CPT

## 2023-04-28 PROCEDURE — 82746 ASSAY OF FOLIC ACID SERUM: CPT

## 2023-04-28 PROCEDURE — 80050 GENERAL HEALTH PANEL: CPT

## 2023-04-28 RX ORDER — PREDNISONE 20 MG/1
20 TABLET ORAL DAILY
Qty: 5 TABLET | Refills: 0 | Status: SHIPPED | OUTPATIENT
Start: 2023-04-28

## 2023-04-28 RX ORDER — METOPROLOL SUCCINATE 25 MG/1
25 TABLET, EXTENDED RELEASE ORAL DAILY
Qty: 90 TABLET | Refills: 3 | Status: SHIPPED | OUTPATIENT
Start: 2023-04-28

## 2023-04-28 RX ORDER — BROMPHENIRAMINE MALEATE, PSEUDOEPHEDRINE HYDROCHLORIDE, AND DEXTROMETHORPHAN HYDROBROMIDE 2; 30; 10 MG/5ML; MG/5ML; MG/5ML
5 SYRUP ORAL 4 TIMES DAILY PRN
Qty: 118 ML | Refills: 0 | Status: SHIPPED | OUTPATIENT
Start: 2023-04-28

## 2023-04-28 RX ORDER — METOPROLOL SUCCINATE 25 MG/1
25 TABLET, EXTENDED RELEASE ORAL DAILY
Qty: 90 TABLET | Refills: 3 | Status: SHIPPED | OUTPATIENT
Start: 2023-04-28 | End: 2023-04-28 | Stop reason: SDUPTHER

## 2023-04-28 RX ORDER — ASPIRIN 81 MG/1
81 TABLET ORAL DAILY
Qty: 90 TABLET | Refills: 11 | Status: SHIPPED | OUTPATIENT
Start: 2023-04-28

## 2023-04-28 RX ORDER — ATORVASTATIN CALCIUM 40 MG/1
40 TABLET, FILM COATED ORAL NIGHTLY
Qty: 90 TABLET | Refills: 1 | Status: SHIPPED | OUTPATIENT
Start: 2023-04-28

## 2023-04-28 RX ORDER — AZITHROMYCIN 250 MG/1
TABLET, FILM COATED ORAL
Qty: 6 TABLET | Refills: 0 | Status: SHIPPED | OUTPATIENT
Start: 2023-04-28

## 2023-04-28 NOTE — PROGRESS NOTES
Subjective:        Nba Gaffney is a 62 y.o. male who here for follow up    No chief complaint on file.    1 year follow-up for hypertension    HPI    This is a 62-year-old male who is new to this provider.  He has a history of hyper hyperlipidemia, tobacco abuse, seasonal allergies, and history of CAD with stent placements in 2021.  Here today for refill of his beta-blocker.  He states he has been out of his medication for about a month.  He has been having shortness of breath.    Lipid panel in August 2022 revealed , HDL 42, LDL 70 and triglycerides 171.  Echo in 2021 revealed echo EF 45%, LV wall segments are hypokinetic: Apical septal, mid inferior septal, and apex hypokinetic.  LV diastolic function was normal and no evidence of pericardial effusion.  Cardiac cath in 2021 with successful thrombectomy of the mid RCA.  Successful angioplasty and stent to the mid % reduced to 0%.  Circumflex artery ostial approximately 50% narrowed.  Midportion 50% narrowed.  LAD shows early atherosclerotic plaque.          The following portions of the patient's history were reviewed and updated as appropriate: allergies, current medications, past family history, past medical history, past social history, past surgical history and problem list.    Past Medical History:   Diagnosis Date   • Environmental allergies     SEASONAL ALLERGIES   • Hyperlipidemia    • Hypertension    • Incisional hernia 2008   • Lumbago     LOW BACK PAIN   • Lumbar disc herniation 03/17/2014    L4-5   • Lumbar spinal stenosis 03/17/2014   • MI (myocardial infarction) 07/28/2021    SURGERY - 2 STENTS PLACED  7-2021- SEE'S DR Gonzales, DENIED  CP/SOB   • Shoulder pain, left          reports that he has been smoking cigarettes. He has a 23.00 pack-year smoking history. He has never used smokeless tobacco. He reports that he does not currently use alcohol. He reports that he does not currently use drugs.     Family History   Problem  Relation Age of Onset   • Malig Hyperthermia Neg Hx        ROS     Review of Systems  Constitutional: No wt loss, fever, fatigue  Gastrointestinal: No nausea, abdominal pain  Behavioral/Psych: No insomnia or anxiety  Cardiovascular: denies chest pain, and +shortness of breath      Objective:           Vitals and nursing note reviewed.   Constitutional:       Appearance: Well-developed.   HENT:      Head: Normocephalic.      Right Ear: External ear normal.      Left Ear: External ear normal.   Neck:      Vascular: No JVD.   Pulmonary:      Effort: Pulmonary effort is normal. No respiratory distress.      Breath sounds: Normal breath sounds. No stridor. No rales.   Cardiovascular:      Normal rate. Regular rhythm.      No gallop.   Pulses:     Intact distal pulses.   Abdominal:      General: Bowel sounds are normal. There is no distension.      Palpations: Abdomen is soft.      Tenderness: There is no abdominal tenderness. There is no guarding.   Musculoskeletal: Normal range of motion.         General: No tenderness.      Cervical back: Normal range of motion. Skin:     General: Skin is warm.   Neurological:      Mental Status: Alert and oriented to person, place, and time.      Deep Tendon Reflexes: Reflexes are normal and symmetric.   Psychiatric:         Judgment: Judgment normal.           ECG 12 Lead    Date/Time: 4/28/2023 1:35 PM  Performed by: Nicky Bess APRN  Authorized by: Nicky Bess APRN   Comparison: compared with previous ECG from 7/30/2021  Similar to previous ECG  Comparison to previous ECG: Heart rate has increased some  Rhythm: sinus rhythm  Rate: normal    Clinical impression: non-specific ECG          2021    Interpretation Summary    • The following left ventricular wall segments are hypokinetic: apical septal, mid inferoseptal and apex hypokinetic.  • Calculated left ventricular EF = 45% Estimated left ventricular EF was in agreement with the calculated left ventricular  EF.  • Left ventricular diastolic function was normal.  • There is no evidence of pericardial effusion. .     Interpretation Summary    The stress treadmill was not diagnostic, I will recommend to do a stress echo or a stress sestamibi in this patient for better evaluation of ischemia and coronary disease  2021  HEMODYNAMIC / ANGIOGRAPHIC DATA:    1. Left ventricular end diastolic pressure was 10 mmHg.  2. Left ventriculography revealed an EF around 60%.    3. The left main is normal left main.  4. The left anterior descending artery is .Left anterior descending shows early atherosclerotic plaque including the diagonal and  branches  5. The left circumflex is .Circumflex artery ostial approximately 50% narrowed midportion 50% narrowed  6. The right coronary artery is .Right coronary artery dominant 100% occluded proximal to mid, reduced to 0% with thrombectomy as well as angioplasty with 3.5/15 Xience stent, moderate distal disease of 50 to 60% and PDA posterolateral branches  7. Successful angioplasty and stent to the mid % reduced to 0% with 3.5/15 Xience stent  8. Successful thrombectomy of the mid RCA     CHAYO FLOW    PRE.0......       POST..3....     TYPE OF LESION.......C......     RECOMMENDATIONS:  Post-procedure care will focus on prevention of any ischemic events and congestive complications.  Aggressive risk factor modification will be carried out.     Importance of taking dual antiplatelets for one year  has been explained, risk of stent thrombosis leading to the acute MI, which carries high morbidity and mortality has been explained     Discontinuation or interruptions of these medications should be under the strict guidance of appropriate health professional                 Current Outpatient Medications:   •  aspirin (Aspirin Low Dose) 81 MG EC tablet, Take 1 tablet by mouth Daily., Disp: 90 tablet, Rfl: 11  •  atorvastatin (LIPITOR) 40 MG tablet, Take 1 tablet by mouth Every Night.,  Disp: 90 tablet, Rfl: 1  •  azithromycin (Zithromax Z-Matthias) 250 MG tablet, Take 2 tablets by mouth on day 1, then 1 tablet daily on days 2-5, Disp: 6 tablet, Rfl: 0  •  brompheniramine-pseudoephedrine-DM 30-2-10 MG/5ML syrup, Take 5 mL by mouth 4 (Four) Times a Day As Needed for Congestion or Cough., Disp: 118 mL, Rfl: 0  •  cyclobenzaprine (FLEXERIL) 10 MG tablet, Take 1 tablet by mouth 2 (Two) Times a Day As Needed for Muscle Spasms. (Patient not taking: Reported on 4/28/2023), Disp: 30 tablet, Rfl: 5  •  HYDROcodone-acetaminophen (NORCO)  MG per tablet, Take 1 tablet by mouth At Night As Needed for Severe Pain. (Patient not taking: Reported on 4/28/2023), Disp: 20 tablet, Rfl: 0  •  metFORMIN (Glucophage) 850 MG tablet, Take 1 tablet by mouth 2 (Two) Times a Day With Meals., Disp: 180 tablet, Rfl: 1  •  metoprolol succinate XL (TOPROL-XL) 25 MG 24 hr tablet, Take 1 tablet by mouth Daily., Disp: 90 tablet, Rfl: 3  •  predniSONE (DELTASONE) 20 MG tablet, Take 1 tablet by mouth Daily., Disp: 5 tablet, Rfl: 0     Assessment:        Patient Active Problem List   Diagnosis   • Traumatic complete tear of left rotator cuff   • S/P rotator cuff surgery   • Arthrofibrosis of left shoulder   • Post-operative state   • MI (myocardial infarction)   • Coronary artery disease involving native coronary artery of native heart without angina pectoris   • Tobacco use   • History of ST elevation myocardial infarction (STEMI)   • Stented coronary artery   • Type 2 diabetes mellitus without complication, without long-term current use of insulin   • Gastroesophageal reflux disease without esophagitis   • Pulmonary nodule   • Smoker   • Positive colorectal cancer screening using Cologuard test   • Screening for colon cancer   • Environmental allergies   • Hyperlipidemia   • Hypertension   • Incisional hernia   • Lumbago   • Lumbar disc herniation   • Lumbar spinal stenosis   • Shoulder pain, left               Plan:   1.  CAD with  history of stent placement in 2021.  He denies any chest pain, but has been having shortness of breath.  I will do an echo.  Previous ischemic work-up as above.  Refilll beta-blocker, and continue current management.       It was explained to the patient that stress testing carries 85% specificity/sensitivity, and does not rule out future cardiac event.  Risks of the procedure were explained to the patient including shortness of breath, induction of myocardial infarction, and dizziness.  Patient is agreeable to proceeding with stress testing.       Risk reduction for the coronary artery disease, controlling the blood pressure, blood sugar management, cholesterol management, exercise, stress management, and proper compliance with medications and follow-up has been discussed    2.  Hypertension: Today in the office blood pressure is elevated today.  He states this morning his blood pressure was 130s systolically.  He has been without his beta-blocker for approximately a month according to him and his wife.  I will refill today    Educated patient on exercising for at least 30 minutes a day for 2 to 3 days a week. Importance of controlling hypertension and blood pressure checkup on the regular basis has been explained. Hypertension as a silent killer has been discussed. Risk reduction of the weight and regular exercises to control the hypertension has been explained.        3.  Hyperlipidemia: He states his primary care manages his labs and cholesterol.    Risk of the hyperlipidemia, importance of the treatment has been explained. Pros and cons of the statins has been explained. Regular blood workup as well as side effects including the liver failure, myelopathy death has been explained.     4.  Shortness of breath, he will have an echo.    5. Tobacco abuse: He states he smokes 1 pack to a pack to 2 packs a day.  He does not have a date to quit.    Nba Gaffney has been explained that tobacco abuse is extremely  harmful to the body including to the cardiovascular, it significantly increases the risk of atherosclerosis, myocardial infarction, strokes and peripheral vascular disease  Strongly advised to stop tobacco abuse  Secondhand smoking also has been explained       No diagnosis found.    There are no diagnoses linked to this encounter.    COUNSELING: sukhwinder Marie was given to patient for the following topics: diagnostic results, risk factor reductions, impressions, risks and benefits of treatment options and importance of treatment compliance .       SMOKING COUNSELING: as above    Refill BB. F/U for results of treadmill and echo.    Sincerely,   QUNA Alexander  [facility]  04/28/23  11:38 EDT    EMR Dragon/Transcription disclaimer:   Much of this encounter note is an electronic transcription/translation of spoken language to printed text. The electronic translation of spoken language may permit erroneous, or at times, nonsensical words or phrases to be inadvertently transcribed; Although I have reviewed the note for such errors, some may still exist.

## 2023-04-28 NOTE — PROGRESS NOTES
"Chief Complaint  Following up on diabetes/medication/sick symptoms  Subjective         Nba Gaffney is a 62 y.o. male who presents to [facility]    62 years old comes to the clinic today to follow-up.    Noncompliant patient, currently not taking any of his medications.  Patient ran out of insurance and reporting that doctors offices did not refill his medications.    Type 2 diabetes; new A1c needed, was controlled on metformin    Cardiac disease; was supposed to be taking aspirin/statin with metoprolol.    Patient has not followed up with cardiologist or hematologist recently but has scheduled appointments today    Patient is complaining of head cold, reports spending some time outside on Monday where he was not prepared for cold weather and since then he has been having sore throat/nasal discharge without any fever/chest pain/shortness of breath.    Denies any other acute complaint  Objective   Vital Signs:   Vitals:    04/28/23 0659   BP: 137/76   Pulse: 89   Resp: 19   Temp: 98.4 °F (36.9 °C)   SpO2: 96%   Weight: 95.6 kg (210 lb 11.2 oz)   Height: 172.7 cm (68\")      Body mass index is 32.04 kg/m².   Wt Readings from Last 3 Encounters:   04/28/23 95.6 kg (210 lb 11.2 oz)   12/14/22 99.8 kg (220 lb)   09/23/22 99.1 kg (218 lb 7.6 oz)      BP Readings from Last 3 Encounters:   04/28/23 137/76   12/14/22 159/96   09/23/22 138/69        Patient Care Team:  Eugene Campos MD as PCP - General (Family Medicine)  Yuval Snider MD as Consulting Physician (Hematology and Oncology)     Physical Exam  Vitals reviewed.   Constitutional:       Appearance: Normal appearance. He is well-developed.   HENT:      Head: Normocephalic and atraumatic.      Right Ear: External ear normal. Tympanic membrane has decreased mobility.      Left Ear: External ear normal. Tympanic membrane has decreased mobility.      Mouth/Throat:      Pharynx: Pharyngeal swelling and posterior oropharyngeal " erythema present. No oropharyngeal exudate.   Eyes:      Conjunctiva/sclera: Conjunctivae normal.      Pupils: Pupils are equal, round, and reactive to light.   Cardiovascular:      Rate and Rhythm: Normal rate and regular rhythm.      Heart sounds: No murmur heard.    No friction rub. No gallop.   Pulmonary:      Effort: Pulmonary effort is normal.      Breath sounds: Normal breath sounds. No wheezing or rhonchi.   Abdominal:      General: Bowel sounds are normal. There is no distension.      Palpations: Abdomen is soft.      Tenderness: There is no abdominal tenderness.   Skin:     General: Skin is warm and dry.   Neurological:      Mental Status: He is alert and oriented to person, place, and time.      Cranial Nerves: No cranial nerve deficit.   Psychiatric:         Mood and Affect: Mood and affect normal.         Behavior: Behavior normal.         Thought Content: Thought content normal.         Judgment: Judgment normal.                       Assessment and Plan   Diagnoses and all orders for this visit:    1. Type 2 diabetes mellitus without complication, without long-term current use of insulin (Primary)  Comments:  New A1c needed, restart metformin, lifestyle modifications discussed  Orders:  -     aspirin (Aspirin Low Dose) 81 MG EC tablet; Take 1 tablet by mouth Daily.  Dispense: 90 tablet; Refill: 11  -     atorvastatin (LIPITOR) 40 MG tablet; Take 1 tablet by mouth Every Night.  Dispense: 90 tablet; Refill: 1  -     metFORMIN (Glucophage) 850 MG tablet; Take 1 tablet by mouth 2 (Two) Times a Day With Meals.  Dispense: 180 tablet; Refill: 1  -     metoprolol succinate XL (TOPROL-XL) 25 MG 24 hr tablet; Take 1 tablet by mouth Daily.  Dispense: 90 tablet; Refill: 3  -     TSH Rfx On Abnormal To Free T4; Future  -     CBC & Differential; Future  -     Comprehensive Metabolic Panel; Future  -     Hemoglobin A1c; Future  -     Lipid Panel; Future  -     Vitamin B12; Future  -     Folate; Future  -      Microalbumin / Creatinine Urine Ratio - Urine, Clean Catch  -     Urinalysis With Microscopic - Urine, Clean Catch; Future    2. History of ST elevation MI  Comments:  Follow-up with cardiologist  Orders:  -     aspirin (Aspirin Low Dose) 81 MG EC tablet; Take 1 tablet by mouth Daily.  Dispense: 90 tablet; Refill: 11  -     atorvastatin (LIPITOR) 40 MG tablet; Take 1 tablet by mouth Every Night.  Dispense: 90 tablet; Refill: 1  -     metFORMIN (Glucophage) 850 MG tablet; Take 1 tablet by mouth 2 (Two) Times a Day With Meals.  Dispense: 180 tablet; Refill: 1  -     metoprolol succinate XL (TOPROL-XL) 25 MG 24 hr tablet; Take 1 tablet by mouth Daily.  Dispense: 90 tablet; Refill: 3  -     TSH Rfx On Abnormal To Free T4; Future  -     CBC & Differential; Future  -     Comprehensive Metabolic Panel; Future  -     Hemoglobin A1c; Future  -     Lipid Panel; Future  -     Vitamin B12; Future  -     Folate; Future  -     Microalbumin / Creatinine Urine Ratio - Urine, Clean Catch  -     Urinalysis With Microscopic - Urine, Clean Catch; Future    3. Stented coronary artery  Comments:  Continue with aspirin and restart Lipitor/metoprolol  Orders:  -     TSH Rfx On Abnormal To Free T4; Future  -     CBC & Differential; Future  -     Comprehensive Metabolic Panel; Future  -     Hemoglobin A1c; Future  -     Lipid Panel; Future  -     Vitamin B12; Future  -     Folate; Future  -     Microalbumin / Creatinine Urine Ratio - Urine, Clean Catch  -     Urinalysis With Microscopic - Urine, Clean Catch; Future    4. Pulmonary nodule  -     TSH Rfx On Abnormal To Free T4; Future  -     CBC & Differential; Future  -     Comprehensive Metabolic Panel; Future  -     Hemoglobin A1c; Future  -     Lipid Panel; Future  -     Vitamin B12; Future  -     Folate; Future  -     Microalbumin / Creatinine Urine Ratio - Urine, Clean Catch  -     Urinalysis With Microscopic - Urine, Clean Catch; Future    5. Need for hepatitis C screening test  -      TSH Rfx On Abnormal To Free T4; Future  -     CBC & Differential; Future  -     Comprehensive Metabolic Panel; Future  -     Hemoglobin A1c; Future  -     Lipid Panel; Future  -     Vitamin B12; Future  -     Folate; Future  -     Microalbumin / Creatinine Urine Ratio - Urine, Clean Catch  -     Urinalysis With Microscopic - Urine, Clean Catch; Future  -     Hepatitis C Antibody; Future    6. Acute cough  -     TSH Rfx On Abnormal To Free T4; Future  -     CBC & Differential; Future  -     Comprehensive Metabolic Panel; Future  -     Hemoglobin A1c; Future  -     Lipid Panel; Future  -     Vitamin B12; Future  -     Folate; Future  -     Microalbumin / Creatinine Urine Ratio - Urine, Clean Catch  -     Urinalysis With Microscopic - Urine, Clean Catch; Future  -     azithromycin (Zithromax Z-Matthias) 250 MG tablet; Take 2 tablets by mouth on day 1, then 1 tablet daily on days 2-5  Dispense: 6 tablet; Refill: 0  -     predniSONE (DELTASONE) 20 MG tablet; Take 1 tablet by mouth Daily.  Dispense: 5 tablet; Refill: 0  -     brompheniramine-pseudoephedrine-DM 30-2-10 MG/5ML syrup; Take 5 mL by mouth 4 (Four) Times a Day As Needed for Congestion or Cough.  Dispense: 118 mL; Refill: 0    7. Acute pharyngitis, unspecified etiology  Comments:  Declined rapid swabs  Orders:  -     TSH Rfx On Abnormal To Free T4; Future  -     CBC & Differential; Future  -     Comprehensive Metabolic Panel; Future  -     Hemoglobin A1c; Future  -     Lipid Panel; Future  -     Vitamin B12; Future  -     Folate; Future  -     Microalbumin / Creatinine Urine Ratio - Urine, Clean Catch  -     Urinalysis With Microscopic - Urine, Clean Catch; Future  -     azithromycin (Zithromax Z-Matthias) 250 MG tablet; Take 2 tablets by mouth on day 1, then 1 tablet daily on days 2-5  Dispense: 6 tablet; Refill: 0  -     predniSONE (DELTASONE) 20 MG tablet; Take 1 tablet by mouth Daily.  Dispense: 5 tablet; Refill: 0  -     brompheniramine-pseudoephedrine-DM 30-2-10  MG/5ML syrup; Take 5 mL by mouth 4 (Four) Times a Day As Needed for Congestion or Cough.  Dispense: 118 mL; Refill: 0    8. Annual physical exam, next visit  -     TSH Rfx On Abnormal To Free T4; Future  -     CBC & Differential; Future  -     Comprehensive Metabolic Panel; Future  -     Hemoglobin A1c; Future  -     Lipid Panel; Future  -     Vitamin B12; Future  -     Folate; Future  -     Microalbumin / Creatinine Urine Ratio - Urine, Clean Catch  -     Urinalysis With Microscopic - Urine, Clean Catch; Future  -     Hepatitis C Antibody; Future  -     HIV-1 / O / 2 Ag / Antibody 4th Generation          Tobacco Use: High Risk   • Smoking Tobacco Use: Every Day   • Smokeless Tobacco Use: Never   • Passive Exposure: Not on file            Follow Up   Return in about 6 months (around 10/28/2023) for Next scheduled follow up, Annual physical.  Patient was given instructions and counseling regarding his condition or for health maintenance advice. Please see specific information pulled into the AVS if appropriate.

## 2023-04-28 NOTE — PROGRESS NOTES
Chief Complaint  Monocytosis    Eugene Campos MD Patel, Jahin, MD      Subjective          Nba Gaffney presents to [facility] for leukocytosis.     He presents for follow up. He is doing well overall. No recent infections or hospitalizations. No fevers, chills. Breathing well. No cough or diarrhea. He has cut back on smoking. WBC count normal.      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     Patient's prior CBCs reviewed patient has a chronic history of leukocytosis with a range of 10,000-17,000.  Labs go back to January 2021.  Differential showing lymphocytosis with a mild monocytosis.  Hemoglobin and platelets have always been normal.  CBC from August 26, 2020 show white blood cell count 13.3 thousand hemoglobin 13.9 g/dL and platelets of 421,000 absolute lymphocytes 5.3 and absolute monocytes 1.3.      9/2022: Flow cytometry normal    4/28/23: CBC with WBC of 9.66, hgb 15.8, plt 394      Review of Systems   Constitutional: Positive for fatigue. Negative for appetite change, diaphoresis, fever, unexpected weight gain and unexpected weight loss.   HENT: Negative for hearing loss, mouth sores, sore throat, swollen glands, trouble swallowing and voice change.    Eyes: Negative for blurred vision.   Respiratory: Negative for cough, shortness of breath and wheezing.    Cardiovascular: Negative for chest pain and palpitations.   Gastrointestinal: Negative for abdominal pain, blood in stool, constipation, diarrhea, nausea and vomiting.   Endocrine: Negative for cold intolerance and heat intolerance.   Genitourinary: Negative for difficulty urinating, dysuria, frequency, hematuria and urinary incontinence.   Musculoskeletal: Negative for arthralgias, back pain and myalgias.   Skin: Negative for rash, skin lesions and wound.   Neurological: Negative for dizziness, seizures, weakness, numbness and headache.   Hematological: Does not  bruise/bleed easily.   Psychiatric/Behavioral: Negative for depressed mood. The patient is not nervous/anxious.    All other systems reviewed and are negative.      Current Outpatient Medications on File Prior to Visit   Medication Sig Dispense Refill   • aspirin (Aspirin Low Dose) 81 MG EC tablet Take 1 tablet by mouth Daily. 90 tablet 11   • atorvastatin (LIPITOR) 40 MG tablet Take 1 tablet by mouth Every Night. 90 tablet 1   • azithromycin (Zithromax Z-Matthias) 250 MG tablet Take 2 tablets by mouth on day 1, then 1 tablet daily on days 2-5 6 tablet 0   • brompheniramine-pseudoephedrine-DM 30-2-10 MG/5ML syrup Take 5 mL by mouth 4 (Four) Times a Day As Needed for Congestion or Cough. 118 mL 0   • metFORMIN (Glucophage) 850 MG tablet Take 1 tablet by mouth 2 (Two) Times a Day With Meals. 180 tablet 1   • metoprolol succinate XL (TOPROL-XL) 25 MG 24 hr tablet Take 1 tablet by mouth Daily. 90 tablet 3   • predniSONE (DELTASONE) 20 MG tablet Take 1 tablet by mouth Daily. 5 tablet 0   • cyclobenzaprine (FLEXERIL) 10 MG tablet Take 1 tablet by mouth 2 (Two) Times a Day As Needed for Muscle Spasms. (Patient not taking: Reported on 4/28/2023) 30 tablet 5   • HYDROcodone-acetaminophen (NORCO)  MG per tablet Take 1 tablet by mouth At Night As Needed for Severe Pain. (Patient not taking: Reported on 4/28/2023) 20 tablet 0   • [DISCONTINUED] aspirin (Aspirin Low Dose) 81 MG EC tablet Take 1 tablet by mouth Daily. 90 tablet 11   • [DISCONTINUED] atorvastatin (LIPITOR) 40 MG tablet Take 1 tablet by mouth Every Night. (Patient not taking: Reported on 4/28/2023) 90 tablet 1   • [DISCONTINUED] metFORMIN (Glucophage) 850 MG tablet Take 1 tablet by mouth 2 (Two) Times a Day With Meals. (Patient not taking: Reported on 4/28/2023) 180 tablet 1   • [DISCONTINUED] metoprolol succinate XL (TOPROL-XL) 25 MG 24 hr tablet Take 1 tablet by mouth Daily. (Patient not taking: Reported on 4/28/2023) 30 tablet 3     No current  facility-administered medications on file prior to visit.       No Known Allergies  Past Medical History:   Diagnosis Date   • Environmental allergies     SEASONAL ALLERGIES   • Hyperlipidemia    • Hypertension    • Incisional hernia 2008   • Lumbago     LOW BACK PAIN   • Lumbar disc herniation 03/17/2014    L4-5   • Lumbar spinal stenosis 03/17/2014   • MI (myocardial infarction) 07/28/2021    SURGERY - 2 STENTS PLACED  7-2021- SEE'S DR Gonzales, DENIED  CP/SOB   • Shoulder pain, left      Past Surgical History:   Procedure Laterality Date   • CARDIAC CATHETERIZATION N/A 7/29/2021    Procedure: Left Heart Cath;  Surgeon: Yulissa Gonzales MD;  Location: [facility] LOC[facility];  Service: Cardiovascular;  Laterality: N/A;   • CARDIAC CATHETERIZATION N/A 7/29/2021    Procedure: Coronary angiography;  Surgeon: Yulissa Gonzales MD;  Location: [facility] CATH INVASIVE LOCATION;  Service: Cardiovascular;  Laterality: N/A;   • CARDIAC CATHETERIZATION N/A 7/29/2021    Procedure: Stent KAYY coronary;  Surgeon: Yulissa Gonzales MD;  Location: [facility] CATH INVASIVE LOCATION;  Service: Cardiovascular;  Laterality: N/A;   • CARDIAC CATHETERIZATION N/A 7/29/2021    Procedure: Left ventriculography;  Surgeon: Yulissa Gonzales MD;  Location: [facility] CATH INVASIVE LOCATION;  Service: Cardiovascular;  Laterality: N/A;   • CARDIAC CATHETERIZATION  7/29/2021    Procedure: Percutaneous Manual Thrombectomy - Moon;  Surgeon: Yulissa Gonzales MD;  Location: [facility] CATH INVASIVE LOCATION;  Service: Cardiovascular;;   • ELBOW PROCEDURE Right    • FEMUR FRACTURE SURGERY     • HIP SURGERY Left    • LEG SURGERY     • PERIPHERAL ARTERIAL STENT GRAFT     • SHOULDER ARTHROSCOPY W/ ROTATOR CUFF REPAIR Left 1/15/2021    Procedure: SHOULDER ARTHROSCOPY WITH ROTATOR CUFF REPAIR;  Surgeon: Noah Shaw MD;  [facility] OR [facility];  Service: Orthopedics;  Laterality: Left;   • SHOULDER SURGERY Right    • SPLENECTOMY      • WRIST SURGERY Right     wrist orif     Social History     Socioeconomic History   • Marital status:    Tobacco Use   • Smoking status: Every Day     Packs/day: 0.50     Years: 46.00     Pack years: 23.00     Types: Cigarettes   • Smokeless tobacco: Never   Vaping Use   • Vaping Use: Never used   Substance and Sexual Activity   • Alcohol use: Not Currently   • Drug use: Not Currently   • Sexual activity: Defer     Family History   Problem Relation Age of Onset   • Malig Hyperthermia Neg Hx      Immunization History   Administered Date(s) Administered   • Tdap 01/09/2020       Objective   Physical Exam  Vitals and nursing note reviewed.   Constitutional:       Appearance: Normal appearance.   HENT:      Head: Normocephalic.      Nose: Nose normal.      Mouth/Throat:      Mouth: Mucous membranes are moist.   Pulmonary:      Effort: Pulmonary effort is normal.   Musculoskeletal:      Cervical back: Normal range of motion and neck supple.   Neurological:      General: No focal deficit present.      Mental Status: He is alert and oriented to person, place, and time.   Psychiatric:         Mood and Affect: Mood normal.         Behavior: Behavior normal.         Thought Content: Thought content normal.         Judgment: Judgment normal.         Vitals:    04/28/23 0837   BP: 139/82   Pulse: 80   Resp: 16   Temp: 97.6 °F (36.4 °C)   TempSrc: Temporal   SpO2: 95%   Weight: 95.5 kg (210 lb 8.6 oz)   PainSc: 0-No pain     ECOG score: 0         ECOG: (0) Fully Active - Able to Carry On All Pre-disease Performance Without Restriction  Fall Risk Assessment was completed, and patient is at low risk for falls.  PHQ-9 Total Score:         The patient is  experiencing fatigue. Fatigue score: 1    PT/OT Functional Screening: PT fx screen: No needs identified  Speech Functional Screening: Speech fx screen: No needs identified  Rehab to be ordered: Rehab to be ordered: No needs identified        Result Review :   The  following data was reviewed by: Yuval Snider MD on 04/28/23:  Lab Results   Component Value Date    HGB 15.8 04/28/2023    HCT 46.5 04/28/2023    MCV 89.4 04/28/2023     04/28/2023    WBC 9.66 04/28/2023    NEUTROABS 4.85 04/28/2023    LYMPHSABS 3.31 (H) 04/28/2023    MONOSABS 0.95 (H) 04/28/2023    EOSABS 0.39 04/28/2023    BASOSABS 0.14 04/28/2023     Lab Results   Component Value Date    GLUCOSE 121 (H) 08/19/2022    BUN 10 08/19/2022    CREATININE 0.88 08/19/2022     08/19/2022    K 4.2 08/19/2022     08/19/2022    CO2 23.6 08/19/2022    CALCIUM 9.4 08/19/2022    PROTEINTOT 7.4 08/19/2022    ALBUMIN 4.70 08/19/2022    BILITOT 0.3 08/19/2022    ALKPHOS 72 08/19/2022    AST 23 08/19/2022    ALT 53 (H) 08/19/2022     WBC normal.     Assessment and Plan    Diagnoses and all orders for this visit:    1. 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 Leukocytosis is resolved as of 4/2023. Flow cytometry is normal wihtout any abnormal cell morphology. Very low concern for bone marrow disorder. Recommend annual CBC. PCP can follow. Can refer back if WBC significantly increases from baseline.  Discussed brief tobacco cessation.         Patient Follow Up: PRN  Patient was given instructions and counseling regarding his condition or for health maintenance advice. Please see specific information pulled into the AVS if appropriate.

## 2023-07-27 ENCOUNTER — TELEPHONE (OUTPATIENT)
Dept: CARDIOLOGY | Facility: CLINIC | Age: 63
End: 2023-07-27

## 2023-07-27 NOTE — TELEPHONE ENCOUNTER
Caller: BRENDA    Relationship: VALERIA    Best call back number: [phone]    What form or medical record are you requesting: MEDICAL OPINION FORM, RECEIVED DATE 7/19/23    Who is requesting this form or medical record from you: VALERIA    How would you like to receive the form or medical records (pick-up, mail, fax): FAX  If fax, what is the fax number: [phone]      Timeframe paperwork needed: ASAP    Additional notes: PAPERWORK SHOWS THAT IT HAS BEEN RECEIVED. PLEASE RESEND REQUESTED FORM.

## 2023-07-28 DIAGNOSIS — E11.9 TYPE 2 DIABETES MELLITUS WITHOUT COMPLICATION, WITHOUT LONG-TERM CURRENT USE OF INSULIN: ICD-10-CM

## 2023-07-28 DIAGNOSIS — I25.2 HISTORY OF ST ELEVATION MYOCARDIAL INFARCTION (STEMI): ICD-10-CM

## 2023-07-28 RX ORDER — ASPIRIN 81 MG/1
81 TABLET ORAL DAILY
Qty: 90 TABLET | Refills: 1 | Status: SHIPPED | OUTPATIENT
Start: 2023-07-28

## 2023-09-14 ENCOUNTER — TELEPHONE (OUTPATIENT)
Dept: FAMILY MEDICINE CLINIC | Facility: CLINIC | Age: 63
End: 2023-09-14
Payer: COMMERCIAL

## 2023-09-14 NOTE — TELEPHONE ENCOUNTER
Called Watson, explained that the Ct that was ordered last year is an annual follow up to ensure that there are no changes in size of his pulmonary nodules and they remain stable. Watson verbalized understanding and will ensure this gets completed.    Visit Information Date & Time Provider Department Dept. Phone Encounter #  
 4/20/2017  3:00 [facility] [phone] [id number] Follow-up Instructions Return in about 4 weeks (around 5/18/2017). Upcoming Health Maintenance Date Due Hepatitis C Screening 1959 Pneumococcal 19-64 Medium Risk (1 of 1 - PPSV23) 5/8/1980 DTaP/Tdap/Td series (1 - Tdap) 5/8/1982 PAP AKA CERVICAL CYTOLOGY 5/8/1982 BREAST CANCER SCRN MAMMOGRAM 5/8/2011 FOBT Q 1 YEAR AGE 50-75 5/8/2011 INFLUENZA AGE 9 TO ADULT 8/1/2016 Allergies as of 4/20/2017  Review Complete On: 4/20/2017 By: Lucila Gallardo MD  
  
 Severity Noted Reaction Type Reactions Amoxil [Amoxicillin]  09/05/2011    Hives Tetracycline  09/05/2011    Unknown (comments) Current Immunizations  Never Reviewed No immunizations on file. Not reviewed this visit You Were Diagnosed With   
  
 Codes Comments Anxiety    -  Primary ICD-10-CM: F41.9 ICD-9-CM: 300.00 Smoker     ICD-10-CM: S37.110 ICD-9-CM: 305.1 Vitals BP Pulse Height(growth percentile) Weight(growth percentile) BMI OB Status (!) 127/107 (!) 118 5' 10\" (1.778 m) 161 lb (73 kg) 23.1 kg/m2 Postmenopausal  
 Smoking Status Current Every Day Smoker BMI and BSA Data Body Mass Index Body Surface Area  
 23.1 kg/m 2 1.9 m 2 Preferred Pharmacy Pharmacy Name Phone [phone] - [address], [address] AT [facility] [address] [phone] Your Updated Medication List  
  
   
This list is accurate as of: 4/20/17  3:33 PM.  Always use your most recent med list.  
  
  
  
  
 diazePAM 10 mg tablet Commonly known as:  VALIUM Take 1 Tab by mouth two (2) times a day for 30 days. Max Daily Amount: 20 mg. Indications: anxiety Prescriptions Printed Refills diazePAM (VALIUM) 10 mg tablet 0 Sig: Take 1 Tab by mouth two (2) times a day for 30 days. Max Daily Amount: 20 mg. Indications: anxiety Class: Print Route: Oral  
  
Follow-up Instructions Return in about 4 weeks (around 5/18/2017). Introducing [facility]! Dear Randall Lundborg: Thank you for requesting a [employer] account. Our records indicate that you have previously registered for a [employer] account but its currently inactive. Please call our [employer] support line at [phone]. Additional Information If you have questions, please visit the Frequently Asked Questions section of the [employer] website at [email or web address] Remember, [employer] is NOT to be used for urgent needs. For medical emergencies, dial 911. Now available from your iPhone and Android! Please provide this summary of care documentation to your next provider. Your primary care clinician is listed as Marlen Alfaro. If you have any questions after today's visit, please call [phone].

## 2023-09-14 NOTE — TELEPHONE ENCOUNTER
Caller: ZAIRE FUNES    Relationship: Emergency Contact    Best call back number: [phone]     What is the medical concern/diagnosis:LUNGS    What specialty or service is being requested: MRI CT SCAN      Any additional details: PATIENT'S WIFE CALLED IN STATING THEY RECEIVED A LETTER THAT DR WILLINGHAM HAD SENT A REFERRAL IN FOR THESE TESTS. PATIENT'S WIFE STATES THAT THEY WERE NOT AWARE THIS REFERRAL WAS BEING PUT IN DUE TO THE CANCER DOCTOR CLEARED THE PATIENT AFTER PREVIOUS TESTS SHOWING NO CANCER.     PATIENT IS ASKING FOR A CALL BACK TO CONFIRM DUE TO THIS APPOINTMENT WAS MADE WITHOUT THEM BEING AWARE AND IT IS SET FOR 9.25.23.

## 2023-09-25 ENCOUNTER — HOSPITAL ENCOUNTER (OUTPATIENT)
Dept: CT IMAGING | Facility: HOSPITAL | Age: 63
Discharge: HOME | End: 2023-09-25
Admitting: STUDENT IN AN ORGANIZED HEALTH CARE EDUCATION/TRAINING PROGRAM
Payer: COMMERCIAL

## 2023-09-25 DIAGNOSIS — F17.200 SMOKER: ICD-10-CM

## 2023-09-25 DIAGNOSIS — R91.1 PULMONARY NODULE: ICD-10-CM

## 2023-09-25 PROCEDURE — 71271 CT THORAX LUNG CANCER SCR C-: CPT

## 2023-10-03 ENCOUNTER — OFFICE VISIT (OUTPATIENT)
Dept: NEUROSURGERY | Facility: CLINIC | Age: 63
End: 2023-10-03
Payer: OTHER MISCELLANEOUS

## 2023-10-03 VITALS
HEART RATE: 77 BPM | WEIGHT: 211 LBS | HEIGHT: 68 IN | SYSTOLIC BLOOD PRESSURE: 139 MMHG | BODY MASS INDEX: 31.98 KG/M2 | DIASTOLIC BLOOD PRESSURE: 74 MMHG

## 2023-10-03 DIAGNOSIS — M51.26 HERNIATED NUCLEUS PULPOSUS, L4-5 LEFT: ICD-10-CM

## 2023-10-03 DIAGNOSIS — M54.41 CHRONIC MIDLINE LOW BACK PAIN WITH RIGHT-SIDED SCIATICA: ICD-10-CM

## 2023-10-03 DIAGNOSIS — M51.26 HERNIATED NUCLEUS PULPOSUS, L2-3 RIGHT: Primary | ICD-10-CM

## 2023-10-03 DIAGNOSIS — M47.816 FACET ARTHROPATHY, LUMBAR: ICD-10-CM

## 2023-10-03 DIAGNOSIS — G89.29 CHRONIC MIDLINE LOW BACK PAIN WITH RIGHT-SIDED SCIATICA: ICD-10-CM

## 2023-10-03 PROCEDURE — 99213 OFFICE O/P EST LOW 20 MIN: CPT | Performed by: PHYSICIAN ASSISTANT

## 2023-10-03 NOTE — PROGRESS NOTES
Patient being seen for today for Follow-up  .    Subjective    Nba Gaffney is a 62 y.o. male that presents with Follow-up  .    HPI  Previously: Last seen on 12/14/22 for worsening back pain and right leg pain to about the knee.  He was status post #1 epidural steroid injection which she found helpful for his pain.  There is plan at that time to continue with spinal injections.  There was a refill request for [place] forwarded to Dr. Padron.  There was a refill on his Flexeril 10 mg p.o. twice daily.  There is plan to follow-up after his spinal injection to reassess.    Today: He continues to have lower back and right leg pain to the foot. The pain goes to the left knee as well. The leg pain is worse compared to previously.    He did have a #2 epidural block which he did find helpful.    He denies any other new complaints.     reports that he has been smoking cigarettes. He has a 23.00 pack-year smoking history. He has never used smokeless tobacco.    Review of Systems   Musculoskeletal:  Positive for back pain.   Neurological:  Positive for numbness. Negative for weakness.     Objective   Vitals:    10/03/23 1544   BP: 139/74   Pulse: 77        Physical Exam  Constitutional:       Appearance: Normal appearance.   Pulmonary:      Effort: Pulmonary effort is normal.   Musculoskeletal:         General: Tenderness (midline lumbar and right greater than left lumbar area) present.      Comments: SLR positive bilaterally with back pain   Neurological:      General: No focal deficit present.      Mental Status: He is alert and oriented to person, place, and time.      Sensory: No sensory deficit.      Motor: No weakness.      Deep Tendon Reflexes: Reflexes normal.   Psychiatric:         Mood and Affect: Mood normal.         Behavior: Behavior normal.        Result Review   I reviewed the radiology report for MRI of the lumbar spine without contrast from 7/8/2022 which shows right disc extrusion at L2-L3 possibly  affecting the traversing L3 nerve root on the right.  There is a left disc extrusion at L4-L5 possibly affecting the traversing L5 nerve root on the left.     Assessment and Plan {CC Problem List  Visit Diagnosis  ROS  Review (Popup)  [facility]  Quality  BestPractice  Medications  SmartSets  SnapShot Encounters  Media :23}   Diagnoses and all orders for this visit:    1. Herniated nucleus pulposus, L2-3 right (Primary)    2. Herniated nucleus pulposus, L4-5 left    3. Facet arthropathy, lumbar    4. Chronic midline low back pain with right-sided sciatica    He continues to have pain in the back and the right leg to the big toe and the left leg to the knee.    He previously have disc extrusion on the right at L2-L3 which likely affected the traversing L3 nerve root on the right. There is also severe stenosis at L4-L5.    Considering the worsening right leg pain and the new left leg complaints, I suspect his symptoms are more likely to be related to the narrowing at L4-L5. I do expect surgery potentially could help, but is probably more likely to help the right leg vs left leg, and it does not typically help pain in the back.    He did some spinal epidurals in the past which initially were helpful, so I expect he could continue spinals injections with benefit and he should follow-up with pain management to discuss continued injections.    He is requesting to be restarted on Norco. This is something I would recommend that he discuss further with pain management.    He will monitor for worsening or new complaints and notify us of change.    He will follow-up PRN.  Follow Up {Instructions Charge Capture  Follow-up Communications :23}   Return if symptoms worsen or fail to improve.

## 2023-10-03 NOTE — PROGRESS NOTES
Patient being seen for today for Follow-up  .    Subjective    Nba Gaffney is a 62 y.o. male that presents with Follow-up  .

## 2023-10-28 DIAGNOSIS — E11.9 TYPE 2 DIABETES MELLITUS WITHOUT COMPLICATION, WITHOUT LONG-TERM CURRENT USE OF INSULIN: ICD-10-CM

## 2023-10-28 DIAGNOSIS — I25.2 HISTORY OF ST ELEVATION MYOCARDIAL INFARCTION (STEMI): ICD-10-CM

## 2023-10-30 RX ORDER — ATORVASTATIN CALCIUM 40 MG/1
40 TABLET, FILM COATED ORAL NIGHTLY
Qty: 90 TABLET | Refills: 1 | Status: SHIPPED | OUTPATIENT
Start: 2023-10-30

## 2023-10-31 ENCOUNTER — TELEPHONE (OUTPATIENT)
Dept: FAMILY MEDICINE CLINIC | Facility: CLINIC | Age: 63
End: 2023-10-31

## 2023-11-16 ENCOUNTER — TELEPHONE (OUTPATIENT)
Dept: FAMILY MEDICINE CLINIC | Facility: CLINIC | Age: 63
End: 2023-11-16
Payer: COMMERCIAL

## 2023-11-16 NOTE — TELEPHONE ENCOUNTER
Caller: JOSE CRUZ    [employer] call back number: [phone]     What form or medical record are you requesting: OFFICE NOTES    Who is requesting this form or medical record from you: CLAIMS AND DENIAL    How would you like to receive the form or medical records (pick-up, mail, fax): FAX  If fax, what is the fax number: [phone]  ATTN: JOSE CRUZ    Timeframe paperwork needed: ASAP    Additional notes: JOSE CRUZ FROM [facility] [place] CLAIMS AND DENIALS CALLED STATING THAT SHE NEEDS OFFICE NOTES REGARDING A CT LOW DOSE SCAN THAT WAS ORDERED 8/19/22. SHE IS NEEDING ANY OFFICE NOTES REGARDING THAT ORDER.

## 2024-01-04 DIAGNOSIS — E11.9 TYPE 2 DIABETES MELLITUS WITHOUT COMPLICATION, WITHOUT LONG-TERM CURRENT USE OF INSULIN: ICD-10-CM

## 2024-01-04 DIAGNOSIS — I25.2 HISTORY OF ST ELEVATION MYOCARDIAL INFARCTION (STEMI): ICD-10-CM

## 2024-01-28 NOTE — PROGRESS NOTES
Chief Complaint  Follow-up    Eugene Campos MD Patel, Jahin, MD      Subjective          Nba Gaffney presents to [facility]AT[facility] for leukocytosis.     He was seen recently by dr. Snider on 9/9/2022 for leukocytosis. Reports he recently had an abscessed tooth and was antibiotics. He was noted to have 3 elevated WBC counts in the 10-17 range. labwork on day of visit was 10.82. Hemoglobin was in normal range, Platelet count slightly elevated at 489,000. Differential with absolute lymphocytosis up to 4380. He has asplenia due to an lift accident several years ago. Flow cytometry did not show any abnormal cell morphology. He feels well overall. We discussed his lab results.       Oncology/Hematology History    No history exists.     Nba Gaffney presents to [facility]EMAT[facility] ONCOL[facility] for monocytosis with leukocytosis.     Patient reports he feels well overall. He has no acute complaints. He states he has never been told he has a high white blood cell count. Patient with recent back injury at work in July. He went to pain management for shots and has recovered from this. He did have heart attack in July of 2021 and remains on aspirin for this. He currently works full time. He does smoke about a pack per day and has done so for several years.  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     Patient's prior CBCs reviewed patient has a chronic history of leukocytosis with a range of 10,000-17,000.  Labs go back to January 2021.  Differential showing lymphocytosis with a mild monocytosis.  Hemoglobin and platelets have always been normal.  CBC from August 26, 2020 show white blood cell count 13.3 thousand hemoglobin 13.9 g/dL and platelets of 421,000 absolute lymphocytes 5.3 and absolute monocytes 1.3.    Review of Systems    Constitutional: Negative for appetite change, diaphoresis, fatigue, fever, unexpected weight gain and unexpected weight loss.   HENT: Negative for hearing loss, mouth sores, sore throat, swollen glands, trouble swallowing and voice change.    Eyes: Negative for blurred vision.   Respiratory: Negative for cough, shortness of breath and wheezing.    Cardiovascular: Negative for chest pain and palpitations.   Gastrointestinal: Negative for abdominal pain, blood in stool, constipation, diarrhea, nausea and vomiting.   Endocrine: Negative for cold intolerance and heat intolerance.   Genitourinary: Negative for difficulty urinating, dysuria, frequency, hematuria and urinary incontinence.   Musculoskeletal: Negative for arthralgias, back pain and myalgias.   Skin: Negative for rash, skin lesions and wound.   Neurological: Negative for dizziness, seizures, weakness, numbness and headache.   Hematological: Does not bruise/bleed easily.   Psychiatric/Behavioral: Negative for depressed mood. The patient is not nervous/anxious.        Current Outpatient Medications on File Prior to Visit   Medication Sig Dispense Refill   • aspirin (Aspirin Low Dose) 81 MG EC tablet Take 1 tablet by mouth Daily. 90 tablet 11   • atorvastatin (LIPITOR) 40 MG tablet Take 1 tablet by mouth Every Night. 90 tablet 1   • cyclobenzaprine (FLEXERIL) 10 MG tablet Take 1 tablet by mouth 2 (Two) Times a Day As Needed for Muscle Spasms. 30 tablet 0   • HYDROcodone-acetaminophen (NORCO)  MG per tablet Take 1 tablet by mouth Every 8 (Eight) Hours As Needed for Severe Pain. 40 tablet 0   • metFORMIN (Glucophage) 850 MG tablet Take 1 tablet by mouth 2 (Two) Times a Day With Meals. 180 tablet 1   • metoprolol succinate XL (TOPROL-XL) 25 MG 24 hr tablet Take 1 tablet by mouth Daily. 30 tablet 3     No current facility-administered medications on file prior to visit.       No Known Allergies  Past Medical History:   Diagnosis Date   • Environmental  allergies     SEASONAL ALLERGIES   • Hyperlipidemia    • Hypertension    • Incisional hernia 2008   • Lumbago     LOW BACK PAIN   • Lumbar disc herniation 03/17/2014    L4-5   • Lumbar spinal stenosis 03/17/2014   • MI (myocardial infarction) ([facility]) 07/28/2021    SURGERY - 2 STENTS PLACED  7-2021- SEE'S DR Gonzales, DENIED  CP/SOB   • Shoulder pain, left      Past Surgical History:   Procedure Laterality Date   • CARDIAC CATHETERIZATION N/A 7/29/2021    Procedure: Left Heart Cath;  Surgeon: Yulissa Gonzales MD;  Location:  [facility]ASIVE [facility];  Service: Cardiovascular;  Laterality: N/A;   • CARDIAC CATHETERIZATION N/A 7/29/2021    Procedure: Coronary angiography;  Surgeon: Yulissa Gonzales MD;  Location:  [facility];  Service: Cardiovascular;  Laterality: N/A;   • CARDIAC CATHETERIZATION N/A 7/29/2021    Procedure: Stent KAYY coronary;  Surgeon: Yulissa Gonzales MD;  Location:  [facility]ASIVE [facility];  Service: Cardiovascular;  Laterality: N/A;   • CARDIAC CATHETERIZATION N/A 7/29/2021    Procedure: Left ventriculography;  Surgeon: Yulissa Gonzales MD;  Location:  [facility];  Service: Cardiovascular;  Laterality: N/A;   • CARDIAC CATHETERIZATION  7/29/2021    Procedure: Percutaneous Manual Thrombectomy - [place];  Surgeon: Yulissa Gonzales MD;  [facility];  Service: Cardiovascular;;   • ELBOW PROCEDURE Right    • FEMUR FRACTURE SURGERY     • HIP SURGERY Left    • LEG SURGERY     • PERIPHERAL ARTERIAL STENT GRAFT     • SHOULDER ARTHROSCOPY W/ ROTATOR CUFF REPAIR Left 1/15/2021    Procedure: SHOULDER ARTHROSCOPY WITH ROTATOR CUFF REPAIR;  Surgeon: Noah Shaw MD;  Location:  [facility] [place];  Service: Orthopedics;  Laterality: Left;   • SHOULDER SURGERY Right    • SPLENECTOMY     • WRIST SURGERY Right     wrist orif     Social History     Socioeconomic History   • Marital status:    Tobacco Use   •  Smoking status: Current Every Day Smoker     Packs/day: 0.50     Years: 46.00     Pack years: 23.00     Types: Cigarettes   • Smokeless tobacco: Never Used   Vaping Use   • Vaping Use: Never used   Substance and Sexual Activity   • Alcohol use: Not Currently   • Drug use: Not Currently   • Sexual activity: Defer     Family History   Problem Relation Age of Onset   • Malig Hyperthermia Neg Hx      Immunization History   Administered Date(s) Administered   • Tdap 01/09/2020       Objective   Physical Exam  Vitals and nursing note reviewed.   Constitutional:       Appearance: Normal appearance.   HENT:      Head: Normocephalic.      Nose: Nose normal.      Mouth/Throat:      Mouth: Mucous membranes are moist.   Eyes:      Pupils: Pupils are equal, round, and reactive to light.   Cardiovascular:      Rate and Rhythm: Normal rate and regular rhythm.      Pulses: Normal pulses.      Heart sounds: Normal heart sounds. No murmur heard.  Pulmonary:      Effort: Pulmonary effort is normal.      Breath sounds: Normal breath sounds.   Musculoskeletal:         General: Normal range of motion.      Cervical back: Normal range of motion and neck supple.   Skin:     General: Skin is dry.      Capillary Refill: Capillary refill takes less than 2 seconds.   Neurological:      General: No focal deficit present.      Mental Status: He is alert and oriented to person, place, and time.   Psychiatric:         Mood and Affect: Mood normal.         Behavior: Behavior normal.         Thought Content: Thought content normal.         Judgment: Judgment normal.         There were no vitals filed for this visit.  ECOG score: 0         ECOG: (0) Fully Active - Able to Carry On All Pre-disease Performance Without Restriction  Fall Risk Assessment was completed, and patient is at low risk for falls.  PHQ-9 Total Score:         The patient is  experiencing fatigue. Fatigue score: 1    PT/OT Functional Screening: PT fx screen: No needs  identified  Speech Functional Screening: Speech fx screen: No needs identified  Rehab to be ordered: Rehab to be ordered: No needs identified        Result Review :   The following data was reviewed by: Yesenia Henderson MA on 09/23/2022:  Lab Results   Component Value Date    HGB 14.4 09/10/2022    HCT 41.8 09/10/2022    MCV 88.6 09/10/2022     (H) 09/10/2022    WBC 10.82 (H) 09/10/2022    NEUTROABS 5.16 09/10/2022    LYMPHSABS 4.38 (H) 09/10/2022    MONOSABS 0.88 09/10/2022    EOSABS 0.24 09/10/2022    BASOSABS 0.13 09/10/2022     Lab Results   Component Value Date    GLUCOSE 121 (H) 08/19/2022    BUN 10 08/19/2022    CREATININE 0.88 08/19/2022     08/19/2022    K 4.2 08/19/2022     08/19/2022    CO2 23.6 08/19/2022    CALCIUM 9.4 08/19/2022    PROTEINTOT 7.4 08/19/2022    ALBUMIN 4.70 08/19/2022    BILITOT 0.3 08/19/2022    ALKPHOS 72 08/19/2022    AST 23 08/19/2022    ALT 53 (H) 08/19/2022          Assessment and Plan    Diagnoses and all orders for this visit:    1. Leukocytosis, unspecified type (Primary)    2. Monocytosis    Recent onset of leukocytosis. He is a smoker, and also asplenia due to a work accident several year. Leukocytosis is improving. Also reports a recent abscessed tooth as well. Flow cytometry is normal wihtout any abnormal cell morphology. Will repeat CBC in 4 months to see if leukocytosis continues to improve or resolve. Discussed brief tobacco cessation.     He will follow up with Dr. Snider in 4 months with lab work prior.           Patient Follow Up: 4 months.   Patient was given instructions and counseling regarding his condition or for health maintenance advice. Please see specific information pulled into the AVS if appropriate.     Yesenia Henderson MA    9/23/2022           Resolved.   P/w hypernatremia 149 s/p fall and poor PO intake x 2 days. Most likely 2/2 hypovolemia, evident w/ concentrated urine on exam.     #HAGMA - RESOLVED  #elevated serum lactate - RESOLVED  POA w/ HAGMA w/ most likely cause to be lactic acidosis. resolved w/ D5 w/ 1AMP bicarb

## 2024-01-30 DIAGNOSIS — E11.9 TYPE 2 DIABETES MELLITUS WITHOUT COMPLICATION, WITHOUT LONG-TERM CURRENT USE OF INSULIN: ICD-10-CM

## 2024-01-30 DIAGNOSIS — I25.2 HISTORY OF ST ELEVATION MYOCARDIAL INFARCTION (STEMI): ICD-10-CM

## 2024-01-30 RX ORDER — ASPIRIN 81 MG/1
81 TABLET ORAL DAILY
Qty: 90 TABLET | Refills: 1 | Status: SHIPPED | OUTPATIENT
Start: 2024-01-30

## 2024-04-17 ENCOUNTER — TELEPHONE (OUTPATIENT)
Dept: FAMILY MEDICINE CLINIC | Facility: CLINIC | Age: 64
End: 2024-04-17
Payer: COMMERCIAL

## 2024-04-17 NOTE — TELEPHONE ENCOUNTER
HUB TO RELAY :      Calling patient to schedule for annual physical appointment. Please schedule patient.

## 2024-04-29 DIAGNOSIS — E11.9 TYPE 2 DIABETES MELLITUS WITHOUT COMPLICATION, WITHOUT LONG-TERM CURRENT USE OF INSULIN: ICD-10-CM

## 2024-04-29 DIAGNOSIS — I25.2 HISTORY OF ST ELEVATION MYOCARDIAL INFARCTION (STEMI): ICD-10-CM

## 2024-05-06 RX ORDER — METOPROLOL SUCCINATE 25 MG/1
25 TABLET, EXTENDED RELEASE ORAL DAILY
Qty: 30 TABLET | Refills: 0 | Status: SHIPPED | OUTPATIENT
Start: 2024-05-06

## 2024-05-07 ENCOUNTER — OFFICE VISIT (OUTPATIENT)
Dept: FAMILY MEDICINE CLINIC | Facility: CLINIC | Age: 64
End: 2024-05-07
Payer: COMMERCIAL

## 2024-05-07 ENCOUNTER — LAB (OUTPATIENT)
Dept: LAB | Facility: HOSPITAL | Age: 64
End: 2024-05-07
Payer: COMMERCIAL

## 2024-05-07 VITALS
TEMPERATURE: 98.3 F | WEIGHT: 212.7 LBS | DIASTOLIC BLOOD PRESSURE: 70 MMHG | HEART RATE: 100 BPM | SYSTOLIC BLOOD PRESSURE: 136 MMHG | BODY MASS INDEX: 32.24 KG/M2 | HEIGHT: 68 IN | RESPIRATION RATE: 20 BRPM | OXYGEN SATURATION: 98 %

## 2024-05-07 DIAGNOSIS — I25.2 HISTORY OF ST ELEVATION MYOCARDIAL INFARCTION (STEMI): ICD-10-CM

## 2024-05-07 DIAGNOSIS — Z95.5 STENTED CORONARY ARTERY: ICD-10-CM

## 2024-05-07 DIAGNOSIS — E11.9 TYPE 2 DIABETES MELLITUS WITHOUT COMPLICATION, WITHOUT LONG-TERM CURRENT USE OF INSULIN: ICD-10-CM

## 2024-05-07 DIAGNOSIS — Z12.11 SCREENING FOR COLON CANCER: ICD-10-CM

## 2024-05-07 DIAGNOSIS — F17.200 SMOKING: ICD-10-CM

## 2024-05-07 DIAGNOSIS — E66.9 OBESITY (BMI 30-39.9): ICD-10-CM

## 2024-05-07 DIAGNOSIS — Z12.5 SCREENING FOR PROSTATE CANCER: ICD-10-CM

## 2024-05-07 DIAGNOSIS — E11.9 TYPE 2 DIABETES MELLITUS WITHOUT COMPLICATION, WITHOUT LONG-TERM CURRENT USE OF INSULIN: Primary | ICD-10-CM

## 2024-05-07 LAB
ALBUMIN SERPL-MCNC: 4.9 G/DL (ref 3.5–5.2)
ALBUMIN UR-MCNC: 1.2 MG/DL
ALBUMIN/GLOB SERPL: 1.7 G/DL
ALP SERPL-CCNC: 56 U/L (ref 39–117)
ALT SERPL W P-5'-P-CCNC: 35 U/L (ref 1–41)
ANION GAP SERPL CALCULATED.3IONS-SCNC: 11 MMOL/L (ref 5–15)
AST SERPL-CCNC: 21 U/L (ref 1–40)
BACTERIA UR QL AUTO: NORMAL /HPF
BASOPHILS # BLD AUTO: 0.18 10*3/MM3 (ref 0–0.2)
BASOPHILS NFR BLD AUTO: 1.6 % (ref 0–1.5)
BILIRUB SERPL-MCNC: 0.2 MG/DL (ref 0–1.2)
BILIRUB UR QL STRIP: NEGATIVE
BUN SERPL-MCNC: 11 MG/DL (ref 8–23)
BUN/CREAT SERPL: 13.8 (ref 7–25)
CALCIUM SPEC-SCNC: 9.5 MG/DL (ref 8.6–10.5)
CHLORIDE SERPL-SCNC: 104 MMOL/L (ref 98–107)
CHOLEST SERPL-MCNC: 122 MG/DL (ref 0–200)
CLARITY UR: CLEAR
CO2 SERPL-SCNC: 27 MMOL/L (ref 22–29)
COLOR UR: YELLOW
CREAT SERPL-MCNC: 0.8 MG/DL (ref 0.76–1.27)
CREAT UR-MCNC: 43.1 MG/DL
DEPRECATED RDW RBC AUTO: 47 FL (ref 37–54)
EGFRCR SERPLBLD CKD-EPI 2021: 100.7 ML/MIN/1.73
EOSINOPHIL # BLD AUTO: 0.37 10*3/MM3 (ref 0–0.4)
EOSINOPHIL NFR BLD AUTO: 3.3 % (ref 0.3–6.2)
ERYTHROCYTE [DISTWIDTH] IN BLOOD BY AUTOMATED COUNT: 14 % (ref 12.3–15.4)
GLOBULIN UR ELPH-MCNC: 2.9 GM/DL
GLUCOSE SERPL-MCNC: 87 MG/DL (ref 65–99)
GLUCOSE UR STRIP-MCNC: NEGATIVE MG/DL
HBA1C MFR BLD: 6.1 % (ref 4.8–5.6)
HCT VFR BLD AUTO: 44.4 % (ref 37.5–51)
HDLC SERPL-MCNC: 43 MG/DL (ref 40–60)
HGB BLD-MCNC: 15 G/DL (ref 13–17.7)
HGB UR QL STRIP.AUTO: NEGATIVE
HYALINE CASTS UR QL AUTO: NORMAL /LPF
IMM GRANULOCYTES # BLD AUTO: 0.03 10*3/MM3 (ref 0–0.05)
IMM GRANULOCYTES NFR BLD AUTO: 0.3 % (ref 0–0.5)
KETONES UR QL STRIP: NEGATIVE
LDLC SERPL CALC-MCNC: 60 MG/DL (ref 0–100)
LDLC/HDLC SERPL: 1.37 {RATIO}
LEUKOCYTE ESTERASE UR QL STRIP.AUTO: NEGATIVE
LYMPHOCYTES # BLD AUTO: 4.56 10*3/MM3 (ref 0.7–3.1)
LYMPHOCYTES NFR BLD AUTO: 40.5 % (ref 19.6–45.3)
MCH RBC QN AUTO: 30.9 PG (ref 26.6–33)
MCHC RBC AUTO-ENTMCNC: 33.8 G/DL (ref 31.5–35.7)
MCV RBC AUTO: 91.4 FL (ref 79–97)
MICROALBUMIN/CREAT UR: 27.8 MG/G (ref 0–29)
MONOCYTES # BLD AUTO: 0.92 10*3/MM3 (ref 0.1–0.9)
MONOCYTES NFR BLD AUTO: 8.2 % (ref 5–12)
NEUTROPHILS NFR BLD AUTO: 46.1 % (ref 42.7–76)
NEUTROPHILS NFR BLD AUTO: 5.21 10*3/MM3 (ref 1.7–7)
NITRITE UR QL STRIP: NEGATIVE
NRBC BLD AUTO-RTO: 0 /100 WBC (ref 0–0.2)
PH UR STRIP.AUTO: 6 [PH] (ref 5–8)
PLATELET # BLD AUTO: 414 10*3/MM3 (ref 140–450)
PMV BLD AUTO: 9.4 FL (ref 6–12)
POTASSIUM SERPL-SCNC: 4.6 MMOL/L (ref 3.5–5.2)
PROT SERPL-MCNC: 7.8 G/DL (ref 6–8.5)
PROT UR QL STRIP: NEGATIVE
PSA SERPL-MCNC: 0.47 NG/ML (ref 0–4)
RBC # BLD AUTO: 4.86 10*6/MM3 (ref 4.14–5.8)
RBC # UR STRIP: NORMAL /HPF
REF LAB TEST METHOD: NORMAL
SODIUM SERPL-SCNC: 142 MMOL/L (ref 136–145)
SP GR UR STRIP: 1.01 (ref 1–1.03)
SQUAMOUS #/AREA URNS HPF: NORMAL /HPF
TRIGL SERPL-MCNC: 100 MG/DL (ref 0–150)
TSH SERPL DL<=0.05 MIU/L-ACNC: 2.19 UIU/ML (ref 0.27–4.2)
UROBILINOGEN UR QL STRIP: NORMAL
VLDLC SERPL-MCNC: 19 MG/DL (ref 5–40)
WBC # UR STRIP: NORMAL /HPF
WBC NRBC COR # BLD AUTO: 11.27 10*3/MM3 (ref 3.4–10.8)

## 2024-05-07 PROCEDURE — G0103 PSA SCREENING: HCPCS

## 2024-05-07 PROCEDURE — 83036 HEMOGLOBIN GLYCOSYLATED A1C: CPT

## 2024-05-07 PROCEDURE — 99214 OFFICE O/P EST MOD 30 MIN: CPT | Performed by: STUDENT IN AN ORGANIZED HEALTH CARE EDUCATION/TRAINING PROGRAM

## 2024-05-07 PROCEDURE — 82043 UR ALBUMIN QUANTITATIVE: CPT | Performed by: STUDENT IN AN ORGANIZED HEALTH CARE EDUCATION/TRAINING PROGRAM

## 2024-05-07 PROCEDURE — 82570 ASSAY OF URINE CREATININE: CPT | Performed by: STUDENT IN AN ORGANIZED HEALTH CARE EDUCATION/TRAINING PROGRAM

## 2024-05-07 PROCEDURE — 36415 COLL VENOUS BLD VENIPUNCTURE: CPT

## 2024-05-07 PROCEDURE — 80050 GENERAL HEALTH PANEL: CPT

## 2024-05-07 PROCEDURE — 80061 LIPID PANEL: CPT

## 2024-05-07 PROCEDURE — 81001 URINALYSIS AUTO W/SCOPE: CPT | Performed by: STUDENT IN AN ORGANIZED HEALTH CARE EDUCATION/TRAINING PROGRAM

## 2024-05-24 DIAGNOSIS — I25.2 HISTORY OF ST ELEVATION MYOCARDIAL INFARCTION (STEMI): ICD-10-CM

## 2024-05-24 DIAGNOSIS — E11.9 TYPE 2 DIABETES MELLITUS WITHOUT COMPLICATION, WITHOUT LONG-TERM CURRENT USE OF INSULIN: ICD-10-CM

## 2024-05-24 RX ORDER — ATORVASTATIN CALCIUM 40 MG/1
40 TABLET, FILM COATED ORAL NIGHTLY
Qty: 90 TABLET | Refills: 1 | Status: SHIPPED | OUTPATIENT
Start: 2024-05-24

## 2024-06-03 DIAGNOSIS — I25.2 HISTORY OF ST ELEVATION MYOCARDIAL INFARCTION (STEMI): ICD-10-CM

## 2024-06-03 DIAGNOSIS — E11.9 TYPE 2 DIABETES MELLITUS WITHOUT COMPLICATION, WITHOUT LONG-TERM CURRENT USE OF INSULIN: ICD-10-CM

## 2024-06-03 RX ORDER — METOPROLOL SUCCINATE 25 MG/1
25 TABLET, EXTENDED RELEASE ORAL DAILY
Qty: 30 TABLET | Refills: 0 | OUTPATIENT
Start: 2024-06-03

## 2024-06-03 NOTE — TELEPHONE ENCOUNTER
Caller: Nba Gaffney    Relationship: Self    Best call back number: [phone]    Requested Prescriptions:   Requested Prescriptions     Pending Prescriptions Disp Refills    metoprolol succinate XL (TOPROL-XL) 25 MG 24 hr tablet 30 tablet 0     Sig: Take 1 tablet by mouth Daily. Needs appt        Pharmacy where request should be sent: [facility]     Last office visit with prescribing clinician: Visit date not found   Last telemedicine visit with prescribing clinician: Visit date not found   Next office visit with prescribing clinician: Visit date not found     Additional details provided by patient: 2 DAYS OF MEDICATION LEFT    Does the patient have less than a 3 day supply:  [x] Yes  [] No    Would you like a call back once the refill request has been completed: [] Yes [x] No    If the office needs to give you a call back, can they leave a voicemail: [x] Yes [] No    Pernell Chavez   06/03/24 14:32 EDT

## 2024-06-05 DIAGNOSIS — I25.2 HISTORY OF ST ELEVATION MYOCARDIAL INFARCTION (STEMI): ICD-10-CM

## 2024-06-05 DIAGNOSIS — E11.9 TYPE 2 DIABETES MELLITUS WITHOUT COMPLICATION, WITHOUT LONG-TERM CURRENT USE OF INSULIN: ICD-10-CM

## 2024-06-07 RX ORDER — METOPROLOL SUCCINATE 25 MG/1
25 TABLET, EXTENDED RELEASE ORAL DAILY
Qty: 30 TABLET | Refills: 0 | OUTPATIENT
Start: 2024-06-07

## 2024-06-12 DIAGNOSIS — I25.2 HISTORY OF ST ELEVATION MYOCARDIAL INFARCTION (STEMI): ICD-10-CM

## 2024-06-12 DIAGNOSIS — E11.9 TYPE 2 DIABETES MELLITUS WITHOUT COMPLICATION, WITHOUT LONG-TERM CURRENT USE OF INSULIN: ICD-10-CM

## 2024-06-14 RX ORDER — METOPROLOL SUCCINATE 25 MG/1
25 TABLET, EXTENDED RELEASE ORAL DAILY
Qty: 30 TABLET | Refills: 0 | Status: SHIPPED | OUTPATIENT
Start: 2024-06-14

## 2024-07-18 ENCOUNTER — TELEPHONE (OUTPATIENT)
Dept: CARDIOLOGY | Facility: CLINIC | Age: 64
End: 2024-07-18

## 2024-07-18 DIAGNOSIS — E11.9 TYPE 2 DIABETES MELLITUS WITHOUT COMPLICATION, WITHOUT LONG-TERM CURRENT USE OF INSULIN: ICD-10-CM

## 2024-07-18 DIAGNOSIS — I25.2 HISTORY OF ST ELEVATION MYOCARDIAL INFARCTION (STEMI): ICD-10-CM

## 2024-07-18 RX ORDER — METOPROLOL SUCCINATE 25 MG/1
25 TABLET, EXTENDED RELEASE ORAL DAILY
Qty: 30 TABLET | Refills: 0 | Status: CANCELLED | OUTPATIENT
Start: 2024-07-18

## 2024-07-18 NOTE — TELEPHONE ENCOUNTER
Caller: GulshanNba    Relationship: Self    Best call back number: [phone]     Requested Prescriptions:   Requested Prescriptions     Pending Prescriptions Disp Refills    metoprolol succinate XL (TOPROL-XL) 25 MG 24 hr tablet 30 tablet 0     Sig: Take 1 tablet by mouth Daily. Needs appt        Pharmacy where request should be sent: [facility] [place]     Last office visit with prescribing clinician: Visit date not found   Last telemedicine visit with prescribing clinician: Visit date not found   Next office visit with prescribing clinician: Visit date not found     Additional details provided by patient: PT CALLING TO SCHEDULE AN APPT WITH OFFICE AS HE IS NEEDING REFILLS SENT IN - HE TOOK HIS LAST TABLET TODAY - FIRST AVAIL WAS 07.30.24 AND PT IS ASKING IF PRESCRIPTION CAN BE SENT IN TO COVER HIM OR IF SOONER APPT AVAILABLE - PT STATES HE HAD VM STATING AN APPT WAS NEEDED IN ORDER TO GET REFILLS     Does the patient have less than a 3 day supply:  [x] Yes  [] No    Would you like a call back once the refill request has been completed: [x] Yes [] No    If the office needs to give you a call back, can they leave a voicemail: [x] Yes [] No    Pernell Gracia Rep   07/18/24 10:10 EDT

## 2024-07-22 DIAGNOSIS — I25.2 HISTORY OF ST ELEVATION MYOCARDIAL INFARCTION (STEMI): ICD-10-CM

## 2024-07-22 DIAGNOSIS — E11.9 TYPE 2 DIABETES MELLITUS WITHOUT COMPLICATION, WITHOUT LONG-TERM CURRENT USE OF INSULIN: ICD-10-CM

## 2024-07-22 RX ORDER — METOPROLOL SUCCINATE 25 MG/1
25 TABLET, EXTENDED RELEASE ORAL DAILY
Qty: 30 TABLET | Refills: 0 | Status: SHIPPED | OUTPATIENT
Start: 2024-07-22

## 2024-07-30 ENCOUNTER — OFFICE VISIT (OUTPATIENT)
Dept: CARDIOLOGY | Facility: CLINIC | Age: 64
End: 2024-07-30
Payer: COMMERCIAL

## 2024-07-30 VITALS
DIASTOLIC BLOOD PRESSURE: 69 MMHG | HEIGHT: 68 IN | WEIGHT: 215 LBS | HEART RATE: 74 BPM | BODY MASS INDEX: 32.58 KG/M2 | SYSTOLIC BLOOD PRESSURE: 127 MMHG

## 2024-07-30 DIAGNOSIS — E78.5 HYPERLIPIDEMIA, UNSPECIFIED HYPERLIPIDEMIA TYPE: ICD-10-CM

## 2024-07-30 DIAGNOSIS — I25.10 CORONARY ARTERY DISEASE INVOLVING NATIVE CORONARY ARTERY OF NATIVE HEART WITHOUT ANGINA PECTORIS: Primary | ICD-10-CM

## 2024-07-30 DIAGNOSIS — I10 HYPERTENSION, UNSPECIFIED TYPE: ICD-10-CM

## 2024-07-30 DIAGNOSIS — R94.31 ABNORMAL ELECTROCARDIOGRAM: ICD-10-CM

## 2024-07-30 DIAGNOSIS — R07.2 PRECORDIAL PAIN: ICD-10-CM

## 2024-07-30 PROCEDURE — 93000 ELECTROCARDIOGRAM COMPLETE: CPT

## 2024-07-30 PROCEDURE — 99214 OFFICE O/P EST MOD 30 MIN: CPT

## 2024-07-30 NOTE — PROGRESS NOTES
Subjective:        Nba Gaffney is a 63 y.o. male who here for follow up    Chief Complaint   Patient presents with    Coronary Artery Disease     1 YR FOLLOW UP        HPI  This is a 63-year-old male who is new with this provider with hypertension, hyperlipidemia, seen in office today for annual follow-up but also complaints of chest pain.  Occasional Left chest pain thumb in the rib pinpoint, occasional right arm numbness    Echo 7/29/2021 EF 45%, LV wall motion abnormality, normal LV diastolic function.  Cardiac catheterization 7/29/2021 normal left main, LAD early atherosclerotic plaque including diagonal branch and  branches.  Circumflex ostial approximately 50% narrowed midportion 50% narrowed.  RCA dominant 100% occluded proximal to mid, reduced to 0% with thrombectomy as well as angioplasty with drug-eluting stent.  Moderate distal disease 50 to 60% stenosis in PDA posterolateral branches.  Normal LV function EF 50%.    The following portions of the patient's history were reviewed and updated as appropriate: allergies, current medications, past family history, past medical history, past social history, past surgical history and problem list.    Past Medical History:   Diagnosis Date    Environmental allergies     SEASONAL ALLERGIES    Hyperlipidemia     Hypertension     Incisional hernia 2008    Lumbago     LOW BACK PAIN    Lumbar disc herniation 03/17/2014    L4-5    Lumbar spinal stenosis 03/17/2014    MI (myocardial infarction) 07/28/2021    SURGERY - 2 STENTS PLACED  7-2021- SEE'S DR Gonzales, DENIED  CP/SOB    Shoulder pain, left          reports that he has been smoking cigarettes. He started smoking about 46 years ago. He has a 23.1 pack-year smoking history. He has never used smokeless tobacco. He reports that he does not currently use alcohol. He reports that he does not currently use drugs.     Family History   Problem Relation Age of Onset    Malig Hyperthermia Neg Hx         ROS     Review of Systems  Constitutional: No wt loss, fever, fatigue  Gastrointestinal: No nausea, abdominal pain  Behavioral/Psych: No insomnia or anxiety  Cardiovascular + chest pain or shortness of breath      Objective:           Vitals and nursing note reviewed.   Constitutional:       Appearance: Well-developed.   HENT:      Head: Normocephalic.      Right Ear: External ear normal.      Left Ear: External ear normal.   Neck:      Vascular: No JVD.   Pulmonary:      Effort: Pulmonary effort is normal. No respiratory distress.      Breath sounds: Normal breath sounds. No stridor. No rales.   Cardiovascular:      Normal rate. Regular rhythm.      No gallop.    Pulses:     Intact distal pulses.   Edema:     Peripheral edema absent.   Abdominal:      General: Bowel sounds are normal. There is no distension.      Palpations: Abdomen is soft.      Tenderness: There is no abdominal tenderness. There is no guarding.   Musculoskeletal: Normal range of motion.         General: No tenderness.      Cervical back: Normal range of motion. Skin:     General: Skin is warm.   Neurological:      Mental Status: Alert and oriented to person, place, and time.      Deep Tendon Reflexes: Reflexes are normal and symmetric.   Psychiatric:         Judgment: Judgment normal.           ECG 12 Lead    Date/Time: 7/30/2024 1:45 PM  Performed by: Melvi Manuel APRN    Authorized by: Melvi Manuel APRN  Comparison: compared with previous ECG from 4/28/2023  Comparison to previous ECG: Inferior t wave changes  Rhythm: sinus rhythm  Rate: normal  BPM: 75    Clinical impression: abnormal EKG          HEMODYNAMIC / ANGIOGRAPHIC DATA:    Left ventricular end diastolic pressure was 10 mmHg.  Left ventriculography revealed an EF around 60%.    The left main is normal left main.  The left anterior descending artery is .Left anterior descending shows early atherosclerotic plaque including the diagonal and  branches  The left  circumflex is .Circumflex artery ostial approximately 50% narrowed midportion 50% narrowed  The right coronary artery is .Right coronary artery dominant 100% occluded proximal to mid, reduced to 0% with thrombectomy as well as angioplasty with 3.5/15 Xience stent, moderate distal disease of 50 to 60% and PDA posterolateral branches  Successful angioplasty and stent to the mid % reduced to 0% with 3.5/15 Xience stent  Successful thrombectomy of the mid RCA     CHAYO FLOW    PRE.0......       POST..3....     TYPE OF LESION.......C......     RECOMMENDATIONS:  Post-procedure care will focus on prevention of any ischemic events and congestive complications.  Aggressive risk factor modification will be carried out.     Importance of taking dual antiplatelets for one year  has been explained, risk of stent thrombosis leading to the acute MI, which carries high morbidity and mortality has been explained     Discontinuation or interruptions of these medications should be under the strict guidance of appropriate health professional          Interpretation Summary    The following left ventricular wall segments are hypokinetic: apical septal, mid inferoseptal and apex hypokinetic.  Calculated left ventricular EF = 45% Estimated left ventricular EF was in agreement with the calculated left ventricular EF.  Left ventricular diastolic function was normal.  There is no evidence of pericardial effusion. .      Current Outpatient Medications:     aspirin (Aspirin Low Dose) 81 MG EC tablet, Take 1 tablet by mouth Daily., Disp: 90 tablet, Rfl: 1    atorvastatin (LIPITOR) 40 MG tablet, Take 1 tablet by mouth Every Night., Disp: 90 tablet, Rfl: 1    metFORMIN (GLUCOPHAGE) 850 MG tablet, Take 1 tablet by mouth 2 (Two) Times a Day With Meals., Disp: 180 tablet, Rfl: 1    metoprolol succinate XL (TOPROL-XL) 25 MG 24 hr tablet, Take 1 tablet by mouth Daily. Needs appt, Disp: 30 tablet, Rfl: 0    multivitamin with minerals  (MULTIVITAMIN ADULT PO), Take 1 tablet by mouth Daily., Disp: , Rfl:      Assessment:        Patient Active Problem List   Diagnosis    Traumatic complete tear of left rotator cuff    S/P rotator cuff surgery    Arthrofibrosis of left shoulder    Post-operative state    MI (myocardial infarction)    Coronary artery disease involving native coronary artery of native heart without angina pectoris    Tobacco use    History of ST elevation myocardial infarction (STEMI)    Stented coronary artery    Type 2 diabetes mellitus without complication, without long-term current use of insulin    Gastroesophageal reflux disease without esophagitis    Pulmonary nodule    Smoker    Positive colorectal cancer screening using Cologuard test    Screening for colon cancer    Environmental allergies    Hyperlipidemia    Hypertension    Incisional hernia    Lumbago    Lumbar disc herniation    Lumbar spinal stenosis    Shoulder pain, left               Plan:   Coronary artery disease: Nonspecific T wave changes changes from previous. I will check stress test and echocardiogram.  Continue aspirin, statin, metoprolol.    It was explained to the patient that stress testing carries 85% specificity/sensitivity, and does not rule out future cardiac event.  Risks of the procedure were explained to the patient including shortness of breath, induction of myocardial infarction, and dizziness.  Patient is agreeable to proceeding with stress testing.     2. Hypertension: 127/69 today in office, controlled.  Continue metoprolol.    Importance of controlling hypertension and blood pressure  checkup on the regular basis has been explained. Hypertension as a silent killer has been discussed. Risk reduction of the weight and regular exercises to control the hypertension has been explained.    3.  Hyperlipidemia: Continue statin therapy               No diagnosis found.    There are no diagnoses linked to this encounter.    COUNSELING: sukhwinder PARKER  DaunisCounseling was given to patient for the following topics: diagnostic results, risk factor reductions, impressions, risks and benefits of treatment options and importance of treatment compliance .       SMOKING COUNSELING: smokes, encouraged cessation    Cardiolite and echo  Follow-up    Sincerely,   QUAN Jones  [facility]  07/30/24  13:44 EDT    EMR Dragon/Transcription disclaimer:   Much of this encounter note is an electronic transcription/translation of spoken language to printed text. The electronic translation of spoken language may permit erroneous, or at times, nonsensical words or phrases to be inadvertently transcribed; Although I have reviewed the note for such errors, some may still exist.

## 2024-08-05 ENCOUNTER — HOSPITAL ENCOUNTER (OUTPATIENT)
Dept: CARDIOLOGY | Facility: HOSPITAL | Age: 64
Discharge: HOME | End: 2024-08-05
Payer: COMMERCIAL

## 2024-08-05 ENCOUNTER — HOSPITAL ENCOUNTER (OUTPATIENT)
Dept: CARDIOLOGY | Facility: HOSPITAL | Age: 64
Discharge: HOME | End: 2024-08-05
Admitting: INTERNAL MEDICINE
Payer: COMMERCIAL

## 2024-08-05 VITALS
OXYGEN SATURATION: 97 % | WEIGHT: 215 LBS | HEART RATE: 64 BPM | BODY MASS INDEX: 32.58 KG/M2 | HEIGHT: 68 IN | DIASTOLIC BLOOD PRESSURE: 70 MMHG | SYSTOLIC BLOOD PRESSURE: 119 MMHG

## 2024-08-05 VITALS
DIASTOLIC BLOOD PRESSURE: 81 MMHG | HEART RATE: 66 BPM | BODY MASS INDEX: 32.58 KG/M2 | WEIGHT: 215 LBS | SYSTOLIC BLOOD PRESSURE: 136 MMHG | HEIGHT: 68 IN

## 2024-08-05 LAB
AORTIC DIMENSIONLESS INDEX: 0.4 (DI)
BH CV ECHO MEAS - ACS: 1.64 CM
BH CV ECHO MEAS - AO MAX PG: 11.3 MMHG
BH CV ECHO MEAS - AO MEAN PG: 6.4 MMHG
BH CV ECHO MEAS - AO ROOT DIAM: 3.4 CM
BH CV ECHO MEAS - AO V2 MAX: 168.3 CM/SEC
BH CV ECHO MEAS - AO V2 VTI: 39.1 CM
BH CV ECHO MEAS - AVA(I,D): 1.19 CM2
BH CV ECHO MEAS - EDV(CUBED): 212.6 ML
BH CV ECHO MEAS - EDV(MOD-SP2): 184 ML
BH CV ECHO MEAS - EDV(MOD-SP4): 206 ML
BH CV ECHO MEAS - EF(MOD-BP): 48 %
BH CV ECHO MEAS - EF(MOD-SP2): 46.2 %
BH CV ECHO MEAS - EF(MOD-SP4): 46.1 %
BH CV ECHO MEAS - ESV(CUBED): 90.1 ML
BH CV ECHO MEAS - ESV(MOD-SP2): 99 ML
BH CV ECHO MEAS - ESV(MOD-SP4): 111 ML
BH CV ECHO MEAS - FS: 24.9 %
BH CV ECHO MEAS - IVS/LVPW: 1.06 CM
BH CV ECHO MEAS - IVSD: 1.23 CM
BH CV ECHO MEAS - LAT PEAK E' VEL: 8.5 CM/SEC
BH CV ECHO MEAS - LV DIASTOLIC VOL/BSA (35-75): 97.7 CM2
BH CV ECHO MEAS - LV MASS(C)D: 309.1 GRAMS
BH CV ECHO MEAS - LV MAX PG: 1.84 MMHG
BH CV ECHO MEAS - LV MEAN PG: 0.81 MMHG
BH CV ECHO MEAS - LV SYSTOLIC VOL/BSA (12-30): 52.7 CM2
BH CV ECHO MEAS - LV V1 MAX: 67.9 CM/SEC
BH CV ECHO MEAS - LV V1 VTI: 14.5 CM
BH CV ECHO MEAS - LVIDD: 6 CM
BH CV ECHO MEAS - LVIDS: 4.5 CM
BH CV ECHO MEAS - LVOT AREA: 3.2 CM2
BH CV ECHO MEAS - LVOT DIAM: 2.02 CM
BH CV ECHO MEAS - LVPWD: 1.16 CM
BH CV ECHO MEAS - MED PEAK E' VEL: 6.3 CM/SEC
BH CV ECHO MEAS - MR MAX PG: 10.6 MMHG
BH CV ECHO MEAS - MR MAX VEL: 162.9 CM/SEC
BH CV ECHO MEAS - MV A DUR: 0.13 SEC
BH CV ECHO MEAS - MV A MAX VEL: 81 CM/SEC
BH CV ECHO MEAS - MV DEC SLOPE: 309.5 CM/SEC2
BH CV ECHO MEAS - MV DEC TIME: 0.23 SEC
BH CV ECHO MEAS - MV E MAX VEL: 87.8 CM/SEC
BH CV ECHO MEAS - MV E/A: 1.08
BH CV ECHO MEAS - MV MAX PG: 3 MMHG
BH CV ECHO MEAS - MV MEAN PG: 1.62 MMHG
BH CV ECHO MEAS - MV P1/2T: 89.4 MSEC
BH CV ECHO MEAS - MV V2 VTI: 34.5 CM
BH CV ECHO MEAS - MVA(P1/2T): 2.46 CM2
BH CV ECHO MEAS - MVA(VTI): 1.35 CM2
BH CV ECHO MEAS - PA ACC TIME: 0.14 SEC
BH CV ECHO MEAS - PA V2 MAX: 106.4 CM/SEC
BH CV ECHO MEAS - PULM A REVS DUR: 0.11 SEC
BH CV ECHO MEAS - PULM A REVS VEL: 23.8 CM/SEC
BH CV ECHO MEAS - PULM DIAS VEL: 42.5 CM/SEC
BH CV ECHO MEAS - PULM S/D: 1.29
BH CV ECHO MEAS - PULM SYS VEL: 54.7 CM/SEC
BH CV ECHO MEAS - QP/QS: 0.85
BH CV ECHO MEAS - RAP SYSTOLE: 3 MMHG
BH CV ECHO MEAS - RV MAX PG: 1.24 MMHG
BH CV ECHO MEAS - RV V1 MAX: 55.8 CM/SEC
BH CV ECHO MEAS - RV V1 VTI: 13.1 CM
BH CV ECHO MEAS - RVOT DIAM: 1.95 CM
BH CV ECHO MEAS - RVSP: 18 MMHG
BH CV ECHO MEAS - SUP REN AO DIAM: 2.5 CM
BH CV ECHO MEAS - SV(LVOT): 46.5 ML
BH CV ECHO MEAS - SV(MOD-SP2): 85 ML
BH CV ECHO MEAS - SV(MOD-SP4): 95 ML
BH CV ECHO MEAS - SV(RVOT): 39.4 ML
BH CV ECHO MEAS - SVI(LVOT): 22.1 ML/M2
BH CV ECHO MEAS - SVI(MOD-SP2): 40.3 ML/M2
BH CV ECHO MEAS - SVI(MOD-SP4): 45.1 ML/M2
BH CV ECHO MEAS - TAPSE (>1.6): 2.28 CM
BH CV ECHO MEAS - TR MAX PG: 15.1 MMHG
BH CV ECHO MEAS - TR MAX VEL: 194.1 CM/SEC
BH CV ECHO MEASUREMENTS AVERAGE E/E' RATIO: 11.86
BH CV REST NUCLEAR ISOTOPE DOSE: 10.9 MCI
BH CV STRESS BP STAGE 1: NORMAL
BH CV STRESS BP STAGE 2: NORMAL
BH CV STRESS BP STAGE 3: NORMAL
BH CV STRESS DURATION MIN STAGE 1: 1
BH CV STRESS DURATION MIN STAGE 2: 1
BH CV STRESS DURATION MIN STAGE 3: 1
BH CV STRESS DURATION SEC STAGE 1: 34
BH CV STRESS DURATION SEC STAGE 2: 26
BH CV STRESS DURATION SEC STAGE 3: 40
BH CV STRESS GRADE STAGE 1: 10
BH CV STRESS GRADE STAGE 2: 12
BH CV STRESS GRADE STAGE 3: 12
BH CV STRESS HR STAGE 1: 98
BH CV STRESS HR STAGE 2: 132
BH CV STRESS HR STAGE 3: 146
BH CV STRESS METS STAGE 1: 1.7
BH CV STRESS METS STAGE 2: 7.1
BH CV STRESS METS STAGE 3: 7.9
BH CV STRESS NUCLEAR ISOTOPE DOSE: 32.1 MCI
BH CV STRESS PROTOCOL 1: NORMAL
BH CV STRESS RECOVERY BP: NORMAL MMHG
BH CV STRESS RECOVERY HR: 86 BPM
BH CV STRESS SPEED STAGE 1: 1.7
BH CV STRESS SPEED STAGE 2: 2.5
BH CV STRESS SPEED STAGE 3: 3.1
BH CV STRESS STAGE 1: 1
BH CV STRESS STAGE 2: 2
BH CV STRESS STAGE 3: 3
BH CV XLRA - RV BASE: 3.4 CM
BH CV XLRA - RV LENGTH: 7.5 CM
BH CV XLRA - RV MID: 2.6 CM
BH CV XLRA - TDI S': 10.2 CM/SEC
LEFT ATRIUM VOLUME INDEX: 38.3 ML/M2
LV EF NUC BP: 26 %
MAXIMAL PREDICTED HEART RATE: 159 BPM
PERCENT MAX PREDICTED HR: 91.82 %
SINUS: 2.9 CM
STJ: 2.3 CM
STRESS BASELINE BP: NORMAL MMHG
STRESS BASELINE HR: 64 BPM
STRESS O2 SAT REST: 97 %
STRESS PERCENT HR: 108 %
STRESS POST ESTIMATED WORKLOAD: 7.9 METS
STRESS POST EXERCISE DUR MIN: 5 MIN
STRESS POST EXERCISE DUR SEC: 45 SEC
STRESS POST PEAK BP: NORMAL MMHG
STRESS POST PEAK HR: 146 BPM
STRESS TARGET HR: 135 BPM

## 2024-08-05 PROCEDURE — 0 TECHNETIUM SESTAMIBI: Performed by: INTERNAL MEDICINE

## 2024-08-05 PROCEDURE — 93017 CV STRESS TEST TRACING ONLY: CPT

## 2024-08-05 PROCEDURE — 25510000001 PERFLUTREN (DEFINITY) 8.476 MG IN SODIUM CHLORIDE (PF) 0.9 % 10 ML INJECTION: Performed by: INTERNAL MEDICINE

## 2024-08-05 PROCEDURE — 93306 TTE W/DOPPLER COMPLETE: CPT

## 2024-08-05 PROCEDURE — 93306 TTE W/DOPPLER COMPLETE: CPT | Performed by: INTERNAL MEDICINE

## 2024-08-05 PROCEDURE — 93016 CV STRESS TEST SUPVJ ONLY: CPT | Performed by: INTERNAL MEDICINE

## 2024-08-05 PROCEDURE — A9500 TC99M SESTAMIBI: HCPCS | Performed by: INTERNAL MEDICINE

## 2024-08-05 PROCEDURE — 78452 HT MUSCLE IMAGE SPECT MULT: CPT

## 2024-08-05 RX ADMIN — TECHNETIUM TC 99M SESTAMIBI 1 DOSE: 1 INJECTION INTRAVENOUS at 09:23

## 2024-08-05 RX ADMIN — SODIUM CHLORIDE 2 ML: 9 INJECTION INTRAMUSCULAR; INTRAVENOUS; SUBCUTANEOUS at 07:44

## 2024-08-05 RX ADMIN — TECHNETIUM TC 99M SESTAMIBI 1 DOSE: 1 INJECTION INTRAVENOUS at 07:05

## 2024-08-12 ENCOUNTER — OFFICE VISIT (OUTPATIENT)
Dept: CARDIOLOGY | Facility: CLINIC | Age: 64
End: 2024-08-12
Payer: COMMERCIAL

## 2024-08-12 VITALS
DIASTOLIC BLOOD PRESSURE: 82 MMHG | SYSTOLIC BLOOD PRESSURE: 138 MMHG | WEIGHT: 214 LBS | BODY MASS INDEX: 32.43 KG/M2 | HEIGHT: 68 IN | HEART RATE: 83 BPM

## 2024-08-12 DIAGNOSIS — Z71.2 ENCOUNTER TO DISCUSS TEST RESULTS: ICD-10-CM

## 2024-08-12 DIAGNOSIS — I10 HYPERTENSION, UNSPECIFIED TYPE: ICD-10-CM

## 2024-08-12 DIAGNOSIS — I25.10 CORONARY ARTERY DISEASE INVOLVING NATIVE CORONARY ARTERY OF NATIVE HEART WITHOUT ANGINA PECTORIS: ICD-10-CM

## 2024-08-12 DIAGNOSIS — R94.39 ABNORMAL NUCLEAR STRESS TEST: Primary | ICD-10-CM

## 2024-08-12 PROCEDURE — 99214 OFFICE O/P EST MOD 30 MIN: CPT | Performed by: NURSE PRACTITIONER

## 2024-08-12 NOTE — PROGRESS NOTES
Subjective:        Nba Gaffney is a 63 y.o. male who here for follow up    No chief complaint on file.      HPI    bNa Gaffney is a 63-year-old male who is current with this provider.  He has a history of hyperlipidemia, hypertension, who is being seen today to go over test results.    8/5/2024 echo: EF 46 to 50%, LV C is mildly dilated, LV diastolic function was normal.  LAC is mildly dilated.  LAV is mildly increased.    8/5/2024 stress test: Mild cardial perfusion imaging indicated a moderate size infarct located in the inferior wall with mild randee-infarct ischemia.    7/29/2021 cardiac cath: Successful thrombectomy of the mid RCA.  Successful angioplasty and stent to the mid % reduced to 0% with 3.5/15 Xience stent.  LAD shows early atherosclerotic plaque including the diagonal and  branches.  Circumflex artery ostial approximately 50% narrowed midportion 50% narrowed.    The following portions of the patient's history were reviewed and updated as appropriate: allergies, current medications, past family history, past medical history, past social history, past surgical history and problem list.    Past Medical History:   Diagnosis Date    Environmental allergies     SEASONAL ALLERGIES    Hyperlipidemia     Hypertension     Incisional hernia 2008    Lumbago     LOW BACK PAIN    Lumbar disc herniation 03/17/2014    L4-5    Lumbar spinal stenosis 03/17/2014    MI (myocardial infarction) 07/28/2021    SURGERY - 2 STENTS PLACED  7-2021- SEE'S DR Gonzales, DENIED  CP/SOB    Shoulder pain, left          reports that he has been smoking cigarettes. He started smoking about 46 years ago. He has a 23.1 pack-year smoking history. He has never used smokeless tobacco. He reports that he does not currently use alcohol. He reports that he does not currently use drugs.     Family History   Problem Relation Age of Onset    Malig Hyperthermia Neg Hx        ROS     Review of Systems  Constitutional: No  wt loss, fever, fatigue  Gastrointestinal: No nausea, abdominal pain  Behavioral/Psych: No insomnia or anxiety  Cardiovascular: No cp or shob      Objective:           Vitals and nursing note reviewed.   Constitutional:       Appearance: Well-developed.   HENT:      Head: Normocephalic.      Right Ear: External ear normal.      Left Ear: External ear normal.   Neck:      Vascular: No JVD.   Pulmonary:      Effort: Pulmonary effort is normal. No respiratory distress.      Breath sounds: Normal breath sounds. No stridor. No rales.   Cardiovascular:      Normal rate. Regular rhythm.      No gallop.    Pulses:     Intact distal pulses.   Edema:     Peripheral edema absent.   Abdominal:      General: Bowel sounds are normal. There is no distension.      Palpations: Abdomen is soft.      Tenderness: There is no abdominal tenderness. There is no guarding.   Musculoskeletal: Normal range of motion.         General: No tenderness.      Cervical back: Normal range of motion. Skin:     General: Skin is warm.   Neurological:      Mental Status: Alert and oriented to person, place, and time.      Deep Tendon Reflexes: Reflexes are normal and symmetric.   Psychiatric:         Judgment: Judgment normal.         Procedures    Interpretation Summary         Findings consistent with an abnormal ECG stress test.    Impressions are consistent with a high risk study.    Left ventricular ejection fraction is moderately reduced (Calculated EF = 26%).    Abnormal LV wall motion consistent with severe hypokinesis.    Moderate risk for ischemic heart disease.    Myocardial perfusion imaging indicates a moderate-sized infarct located in the inferior wall with mild randee-infarct ischemia.     Asymptomatic for chest pain.  ECG is positive for ischemia:  2.75 mm Horizontal  to downslope ST segment Depression Inferior Leads  2.75 mm Downslope ST segment Depression Lateral Leads  BP response: Baseline 119/70, Peak 182/84, Recovery  174//75  Ectopy: rare PVC in exercise,   Exercise Tolerance: poor to fair, protocol adjusted to accommodate patient c/o hip pain,  increased with incline. However wanted to continue exercise to reach THR.  Short Stages performed to accommodate limitations   Lilly treadmill score -8.3  Estimates  5-year survival of  95 % . Using the Duke score there is  a moderate risk  of angiographic coronary disease     Supervised by: Dr. Gonzales  8//5/24  Interpretation Summary         Left ventricular ejection fraction appears to be 46 - 50%.    The left ventricular cavity is mildly dilated.    Left ventricular diastolic function was normal.    The left atrial cavity is mildly dilated.    Left atrial volume is mildly increased.    Estimated right ventricular systolic pressure from tricuspid regurgitation is normal (<35 mmHg).     Interpretation Summary         Findings consistent with an abnormal ECG stress test.    Impressions are consistent with a high risk study.    Left ventricular ejection fraction is moderately reduced (Calculated EF = 26%).    Abnormal LV wall motion consistent with severe hypokinesis.    Moderate risk for ischemic heart disease.    Myocardial perfusion imaging indicates a moderate-sized infarct located in the inferior wall with mild randee-infarct ischemia.     Asymptomatic for chest pain.  ECG is positive for ischemia:  2.75 mm Horizontal  to downslope ST segment Depression Inferior Leads  2.75 mm Downslope ST segment Depression Lateral Leads  BP response: Baseline 119/70, Peak 182/84, Recovery 174//75  Ectopy: rare PVC in exercise,   Exercise Tolerance: poor to fair, protocol adjusted to accommodate patient c/o hip pain,  increased with incline. However wanted to continue exercise to reach THR.  Short Stages performed to accommodate limitations   Lilly treadmill score -8.3  Estimates  5-year survival of  95 % . Using the Duke score there is  a moderate risk  of angiographic coronary  disease     Supervised by: Dr. Gonzales    7/2021  HEMODYNAMIC / ANGIOGRAPHIC DATA:    Left ventricular end diastolic pressure was 10 mmHg.  Left ventriculography revealed an EF around 60%.    The left main is normal left main.  The left anterior descending artery is .Left anterior descending shows early atherosclerotic plaque including the diagonal and  branches  The left circumflex is .Circumflex artery ostial approximately 50% narrowed midportion 50% narrowed  The right coronary artery is .Right coronary artery dominant 100% occluded proximal to mid, reduced to 0% with thrombectomy as well as angioplasty with 3.5/15 Xience stent, moderate distal disease of 50 to 60% and PDA posterolateral branches  Successful angioplasty and stent to the mid % reduced to 0% with 3.5/15 Xience stent  Successful thrombectomy of the mid RCA     CHAYO FLOW    PRE.0......       POST..3....     TYPE OF LESION.......C......     RECOMMENDATIONS:  Post-procedure care will focus on prevention of any ischemic events and congestive complications.  Aggressive risk factor modification will be carried out.     Importance of taking dual antiplatelets for one year  has been explained, risk of stent thrombosis leading to the acute MI, which carries high morbidity and mortality has been explained     Discontinuation or interruptions of these medications should be under the strict guidance of appropriate health professional            Current Outpatient Medications:     aspirin (Aspirin Low Dose) 81 MG EC tablet, Take 1 tablet by mouth Daily., Disp: 90 tablet, Rfl: 1    atorvastatin (LIPITOR) 40 MG tablet, Take 1 tablet by mouth Every Night., Disp: 90 tablet, Rfl: 1    metFORMIN (GLUCOPHAGE) 850 MG tablet, Take 1 tablet by mouth 2 (Two) Times a Day With Meals., Disp: 180 tablet, Rfl: 1    metoprolol succinate XL (TOPROL-XL) 25 MG 24 hr tablet, Take 1 tablet by mouth Daily. Needs appt, Disp: 30 tablet, Rfl: 0    multivitamin with  minerals (MULTIVITAMIN ADULT PO), Take 1 tablet by mouth Daily., Disp: , Rfl:      Assessment:        Patient Active Problem List   Diagnosis    Traumatic complete tear of left rotator cuff    S/P rotator cuff surgery    Arthrofibrosis of left shoulder    Post-operative state    MI (myocardial infarction)    Coronary artery disease involving native coronary artery of native heart without angina pectoris    Tobacco use    History of ST elevation myocardial infarction (STEMI)    Stented coronary artery    Type 2 diabetes mellitus without complication, without long-term current use of insulin    Gastroesophageal reflux disease without esophagitis    Pulmonary nodule    Smoker    Positive colorectal cancer screening using Cologuard test    Screening for colon cancer    Environmental allergies    Hyperlipidemia    Hypertension    Incisional hernia    Lumbago    Lumbar disc herniation    Lumbar spinal stenosis    Shoulder pain, left               Plan:   1.  Test results with CAD: Stress test was positive.  He will undergo a cardiac cath.  Continue aspirin, statin and beta-blockade.    Risk reduction for the coronary artery disease, controlling the blood pressure, blood sugar management, cholesterol management, exercise, stress management, and proper compliance with medications and follow-up has been discussed.    Procedure, risks and options of cardiac cath explained to pt INCLUDING BUT NOT LIMITED TO MI, STROKE, DEATH, INFECTION HAEMORRHAGE, . Pt understands well and agrees with no further questions.        2.  Hypertension: Stable.  No changes.    Educated patient on exercising for at least 30 minutes a day for 2 to 3 days a week. Importance of controlling hypertension and blood pressure checkup on the regular basis has been explained. Hypertension as a silent killer has been discussed. Risk reduction of the weight and regular exercises to control the hypertension has been explained.    3.  Hyperlipidemia: Continue  statin therapy.  His primary care manages his labs and cholesterol.Risk of the hyperlipidemia, importance of the treatment has been explained. Pros and cons of the statins has been explained. Regular blood workup as well as side effects including the liver failure, myelopathy death has been explained.                   No diagnosis found.    There are no diagnoses linked to this encounter.    COUNSELING: sukhwinder Marie was given to patient for the following topics: diagnostic results, risk factor reductions, impressions, risks and benefits of treatment options and importance of treatment compliance .       SMOKING COUNSELING: denies    He will proceed with a cardiac cath due to positive stress test and significant history of CAD.      Sincerely,   QUAN Alexander  [facility]  08/12/24  12:47 EDT    EMR Dragon/Transcription disclaimer: Dictated utilizing Dragon Dictation

## 2024-08-12 NOTE — H&P (VIEW-ONLY)
Subjective:        Nba Gaffney is a 63 y.o. male who here for follow up    No chief complaint on file.      HPI    Nba Gaffney is a 63-year-old male who is current with this provider.  He has a history of hyperlipidemia, hypertension, who is being seen today to go over test results.    8/5/2024 echo: EF 46 to 50%, LV C is mildly dilated, LV diastolic function was normal.  LAC is mildly dilated.  LAV is mildly increased.    8/5/2024 stress test: Mild cardial perfusion imaging indicated a moderate size infarct located in the inferior wall with mild randee-infarct ischemia.    7/29/2021 cardiac cath: Successful thrombectomy of the mid RCA.  Successful angioplasty and stent to the mid % reduced to 0% with 3.5/15 Xience stent.  LAD shows early atherosclerotic plaque including the diagonal and  branches.  Circumflex artery ostial approximately 50% narrowed midportion 50% narrowed.    The following portions of the patient's history were reviewed and updated as appropriate: allergies, current medications, past family history, past medical history, past social history, past surgical history and problem list.    Past Medical History:   Diagnosis Date    Environmental allergies     SEASONAL ALLERGIES    Hyperlipidemia     Hypertension     Incisional hernia 2008    Lumbago     LOW BACK PAIN    Lumbar disc herniation 03/17/2014    L4-5    Lumbar spinal stenosis 03/17/2014    MI (myocardial infarction) 07/28/2021    SURGERY - 2 STENTS PLACED  7-2021- SEE'S DR Gonzales, DENIED  CP/SOB    Shoulder pain, left          reports that he has been smoking cigarettes. He started smoking about 46 years ago. He has a 23.1 pack-year smoking history. He has never used smokeless tobacco. He reports that he does not currently use alcohol. He reports that he does not currently use drugs.     Family History   Problem Relation Age of Onset    Malig Hyperthermia Neg Hx        ROS     Review of Systems  Constitutional: No  wt loss, fever, fatigue  Gastrointestinal: No nausea, abdominal pain  Behavioral/Psych: No insomnia or anxiety  Cardiovascular: No cp or shob      Objective:           Vitals and nursing note reviewed.   Constitutional:       Appearance: Well-developed.   HENT:      Head: Normocephalic.      Right Ear: External ear normal.      Left Ear: External ear normal.   Neck:      Vascular: No JVD.   Pulmonary:      Effort: Pulmonary effort is normal. No respiratory distress.      Breath sounds: Normal breath sounds. No stridor. No rales.   Cardiovascular:      Normal rate. Regular rhythm.      No gallop.    Pulses:     Intact distal pulses.   Edema:     Peripheral edema absent.   Abdominal:      General: Bowel sounds are normal. There is no distension.      Palpations: Abdomen is soft.      Tenderness: There is no abdominal tenderness. There is no guarding.   Musculoskeletal: Normal range of motion.         General: No tenderness.      Cervical back: Normal range of motion. Skin:     General: Skin is warm.   Neurological:      Mental Status: Alert and oriented to person, place, and time.      Deep Tendon Reflexes: Reflexes are normal and symmetric.   Psychiatric:         Judgment: Judgment normal.         Procedures    Interpretation Summary         Findings consistent with an abnormal ECG stress test.    Impressions are consistent with a high risk study.    Left ventricular ejection fraction is moderately reduced (Calculated EF = 26%).    Abnormal LV wall motion consistent with severe hypokinesis.    Moderate risk for ischemic heart disease.    Myocardial perfusion imaging indicates a moderate-sized infarct located in the inferior wall with mild randee-infarct ischemia.     Asymptomatic for chest pain.  ECG is positive for ischemia:  2.75 mm Horizontal  to downslope ST segment Depression Inferior Leads  2.75 mm Downslope ST segment Depression Lateral Leads  BP response: Baseline 119/70, Peak 182/84, Recovery  174//75  Ectopy: rare PVC in exercise,   Exercise Tolerance: poor to fair, protocol adjusted to accommodate patient c/o hip pain,  increased with incline. However wanted to continue exercise to reach THR.  Short Stages performed to accommodate limitations   Lilly treadmill score -8.3  Estimates  5-year survival of  95 % . Using the Duke score there is  a moderate risk  of angiographic coronary disease     Supervised by: Dr. Gonzales  8//5/24  Interpretation Summary         Left ventricular ejection fraction appears to be 46 - 50%.    The left ventricular cavity is mildly dilated.    Left ventricular diastolic function was normal.    The left atrial cavity is mildly dilated.    Left atrial volume is mildly increased.    Estimated right ventricular systolic pressure from tricuspid regurgitation is normal (<35 mmHg).     Interpretation Summary         Findings consistent with an abnormal ECG stress test.    Impressions are consistent with a high risk study.    Left ventricular ejection fraction is moderately reduced (Calculated EF = 26%).    Abnormal LV wall motion consistent with severe hypokinesis.    Moderate risk for ischemic heart disease.    Myocardial perfusion imaging indicates a moderate-sized infarct located in the inferior wall with mild randee-infarct ischemia.     Asymptomatic for chest pain.  ECG is positive for ischemia:  2.75 mm Horizontal  to downslope ST segment Depression Inferior Leads  2.75 mm Downslope ST segment Depression Lateral Leads  BP response: Baseline 119/70, Peak 182/84, Recovery 174//75  Ectopy: rare PVC in exercise,   Exercise Tolerance: poor to fair, protocol adjusted to accommodate patient c/o hip pain,  increased with incline. However wanted to continue exercise to reach THR.  Short Stages performed to accommodate limitations   Lilly treadmill score -8.3  Estimates  5-year survival of  95 % . Using the Duke score there is  a moderate risk  of angiographic coronary  disease     Supervised by: Dr. Gonzales    7/2021  HEMODYNAMIC / ANGIOGRAPHIC DATA:    Left ventricular end diastolic pressure was 10 mmHg.  Left ventriculography revealed an EF around 60%.    The left main is normal left main.  The left anterior descending artery is .Left anterior descending shows early atherosclerotic plaque including the diagonal and  branches  The left circumflex is .Circumflex artery ostial approximately 50% narrowed midportion 50% narrowed  The right coronary artery is .Right coronary artery dominant 100% occluded proximal to mid, reduced to 0% with thrombectomy as well as angioplasty with 3.5/15 Xience stent, moderate distal disease of 50 to 60% and PDA posterolateral branches  Successful angioplasty and stent to the mid % reduced to 0% with 3.5/15 Xience stent  Successful thrombectomy of the mid RCA     CHAYO FLOW    PRE.0......       POST..3....     TYPE OF LESION.......C......     RECOMMENDATIONS:  Post-procedure care will focus on prevention of any ischemic events and congestive complications.  Aggressive risk factor modification will be carried out.     Importance of taking dual antiplatelets for one year  has been explained, risk of stent thrombosis leading to the acute MI, which carries high morbidity and mortality has been explained     Discontinuation or interruptions of these medications should be under the strict guidance of appropriate health professional            Current Outpatient Medications:     aspirin (Aspirin Low Dose) 81 MG EC tablet, Take 1 tablet by mouth Daily., Disp: 90 tablet, Rfl: 1    atorvastatin (LIPITOR) 40 MG tablet, Take 1 tablet by mouth Every Night., Disp: 90 tablet, Rfl: 1    metFORMIN (GLUCOPHAGE) 850 MG tablet, Take 1 tablet by mouth 2 (Two) Times a Day With Meals., Disp: 180 tablet, Rfl: 1    metoprolol succinate XL (TOPROL-XL) 25 MG 24 hr tablet, Take 1 tablet by mouth Daily. Needs appt, Disp: 30 tablet, Rfl: 0    multivitamin with  minerals (MULTIVITAMIN ADULT PO), Take 1 tablet by mouth Daily., Disp: , Rfl:      Assessment:        Patient Active Problem List   Diagnosis    Traumatic complete tear of left rotator cuff    S/P rotator cuff surgery    Arthrofibrosis of left shoulder    Post-operative state    MI (myocardial infarction)    Coronary artery disease involving native coronary artery of native heart without angina pectoris    Tobacco use    History of ST elevation myocardial infarction (STEMI)    Stented coronary artery    Type 2 diabetes mellitus without complication, without long-term current use of insulin    Gastroesophageal reflux disease without esophagitis    Pulmonary nodule    Smoker    Positive colorectal cancer screening using Cologuard test    Screening for colon cancer    Environmental allergies    Hyperlipidemia    Hypertension    Incisional hernia    Lumbago    Lumbar disc herniation    Lumbar spinal stenosis    Shoulder pain, left               Plan:   1.  Test results with CAD: Stress test was positive.  He will undergo a cardiac cath.  Continue aspirin, statin and beta-blockade.    Risk reduction for the coronary artery disease, controlling the blood pressure, blood sugar management, cholesterol management, exercise, stress management, and proper compliance with medications and follow-up has been discussed.    Procedure, risks and options of cardiac cath explained to pt INCLUDING BUT NOT LIMITED TO MI, STROKE, DEATH, INFECTION HAEMORRHAGE, . Pt understands well and agrees with no further questions.        2.  Hypertension: Stable.  No changes.    Educated patient on exercising for at least 30 minutes a day for 2 to 3 days a week. Importance of controlling hypertension and blood pressure checkup on the regular basis has been explained. Hypertension as a silent killer has been discussed. Risk reduction of the weight and regular exercises to control the hypertension has been explained.    3.  Hyperlipidemia: Continue  statin therapy.  His primary care manages his labs and cholesterol.Risk of the hyperlipidemia, importance of the treatment has been explained. Pros and cons of the statins has been explained. Regular blood workup as well as side effects including the liver failure, myelopathy death has been explained.                   No diagnosis found.    There are no diagnoses linked to this encounter.    COUNSELING: sukhwinder Marie was given to patient for the following topics: diagnostic results, risk factor reductions, impressions, risks and benefits of treatment options and importance of treatment compliance .       SMOKING COUNSELING: denies    He will proceed with a cardiac cath due to positive stress test and significant history of CAD.      Sincerely,   QUAN Alexander  [facility]  08/12/24  12:47 EDT    EMR Dragon/Transcription disclaimer: Dictated utilizing Dragon Dictation

## 2024-08-13 PROBLEM — R94.39 ABNORMAL NUCLEAR STRESS TEST: Status: ACTIVE | Noted: 2024-08-12

## 2024-08-14 DIAGNOSIS — I25.2 HISTORY OF ST ELEVATION MYOCARDIAL INFARCTION (STEMI): ICD-10-CM

## 2024-08-14 DIAGNOSIS — E11.9 TYPE 2 DIABETES MELLITUS WITHOUT COMPLICATION, WITHOUT LONG-TERM CURRENT USE OF INSULIN: ICD-10-CM

## 2024-08-14 RX ORDER — ASPIRIN 81 MG/1
81 TABLET ORAL DAILY
Qty: 90 TABLET | Refills: 1 | Status: SHIPPED | OUTPATIENT
Start: 2024-08-14

## 2024-08-14 NOTE — TELEPHONE ENCOUNTER
UPCOMING APPTS  With Family Medicine (Eugene Campos MD)  11/07/2024 at 4:15 PM  LAST OFFICE VISIT - THIS DEPT  5/7/2024 Eugene Campos MD

## 2024-08-21 ENCOUNTER — LAB (OUTPATIENT)
Dept: LAB | Facility: HOSPITAL | Age: 64
End: 2024-08-21
Payer: COMMERCIAL

## 2024-08-21 DIAGNOSIS — I25.10 CORONARY ARTERY DISEASE INVOLVING NATIVE CORONARY ARTERY OF NATIVE HEART WITHOUT ANGINA PECTORIS: ICD-10-CM

## 2024-08-21 DIAGNOSIS — R94.39 ABNORMAL NUCLEAR STRESS TEST: ICD-10-CM

## 2024-08-21 LAB
ANION GAP SERPL CALCULATED.3IONS-SCNC: 10.3 MMOL/L (ref 5–15)
APTT PPP: 28.4 SECONDS (ref 24.2–34.2)
BASOPHILS # BLD AUTO: 0.16 10*3/MM3 (ref 0–0.2)
BASOPHILS NFR BLD AUTO: 1.3 % (ref 0–1.5)
BUN SERPL-MCNC: 11 MG/DL (ref 8–23)
BUN/CREAT SERPL: 14.5 (ref 7–25)
CALCIUM SPEC-SCNC: 9.5 MG/DL (ref 8.6–10.5)
CHLORIDE SERPL-SCNC: 105 MMOL/L (ref 98–107)
CO2 SERPL-SCNC: 25.7 MMOL/L (ref 22–29)
CREAT SERPL-MCNC: 0.76 MG/DL (ref 0.76–1.27)
DEPRECATED RDW RBC AUTO: 41.5 FL (ref 37–54)
EGFRCR SERPLBLD CKD-EPI 2021: 102.3 ML/MIN/1.73
EOSINOPHIL # BLD AUTO: 0.43 10*3/MM3 (ref 0–0.4)
EOSINOPHIL NFR BLD AUTO: 3.5 % (ref 0.3–6.2)
ERYTHROCYTE [DISTWIDTH] IN BLOOD BY AUTOMATED COUNT: 12.8 % (ref 12.3–15.4)
GLUCOSE SERPL-MCNC: 118 MG/DL (ref 65–99)
HCT VFR BLD AUTO: 46 % (ref 37.5–51)
HGB BLD-MCNC: 15.8 G/DL (ref 13–17.7)
IMM GRANULOCYTES # BLD AUTO: 0.04 10*3/MM3 (ref 0–0.05)
IMM GRANULOCYTES NFR BLD AUTO: 0.3 % (ref 0–0.5)
INR PPP: 0.9 (ref 0.86–1.15)
LYMPHOCYTES # BLD AUTO: 4.23 10*3/MM3 (ref 0.7–3.1)
LYMPHOCYTES NFR BLD AUTO: 34.5 % (ref 19.6–45.3)
MCH RBC QN AUTO: 30.9 PG (ref 26.6–33)
MCHC RBC AUTO-ENTMCNC: 34.3 G/DL (ref 31.5–35.7)
MCV RBC AUTO: 90 FL (ref 79–97)
MONOCYTES # BLD AUTO: 0.94 10*3/MM3 (ref 0.1–0.9)
MONOCYTES NFR BLD AUTO: 7.7 % (ref 5–12)
NEUTROPHILS NFR BLD AUTO: 52.7 % (ref 42.7–76)
NEUTROPHILS NFR BLD AUTO: 6.47 10*3/MM3 (ref 1.7–7)
NRBC BLD AUTO-RTO: 0 /100 WBC (ref 0–0.2)
PLATELET # BLD AUTO: 401 10*3/MM3 (ref 140–450)
PMV BLD AUTO: 9.5 FL (ref 6–12)
POTASSIUM SERPL-SCNC: 4.9 MMOL/L (ref 3.5–5.2)
PROTHROMBIN TIME: 12.4 SECONDS (ref 11.8–14.9)
RBC # BLD AUTO: 5.11 10*6/MM3 (ref 4.14–5.8)
SODIUM SERPL-SCNC: 141 MMOL/L (ref 136–145)
WBC NRBC COR # BLD AUTO: 12.27 10*3/MM3 (ref 3.4–10.8)

## 2024-08-21 PROCEDURE — 85025 COMPLETE CBC W/AUTO DIFF WBC: CPT

## 2024-08-21 PROCEDURE — 87635 SARS-COV-2 COVID-19 AMP PRB: CPT | Performed by: FAMILY MEDICINE

## 2024-08-21 PROCEDURE — 85730 THROMBOPLASTIN TIME PARTIAL: CPT

## 2024-08-21 PROCEDURE — 85610 PROTHROMBIN TIME: CPT

## 2024-08-21 PROCEDURE — 80048 BASIC METABOLIC PNL TOTAL CA: CPT

## 2024-08-22 ENCOUNTER — TELEPHONE (OUTPATIENT)
Dept: URGENT CARE | Facility: CLINIC | Age: 64
End: 2024-08-22
Payer: COMMERCIAL

## 2024-08-22 NOTE — TELEPHONE ENCOUNTER
Contacted patient regarding his positive COVID PCR result and confirmation of his rapid COVID test that was performed in the clinic.  Patient was scheduled for outpatient cardiology catheter procedure at [facility] in [place].  Counseled patient to call their office immediately to notify of his being positive for COVID and discussed what they would like to do.  Cautioned him that there was a good chance they would want to reschedule his procedure despite him being currently asymptomatic due to his testing positive following known exposure on last Monday.    Patient voiced understanding and will contact cardiologist office.

## 2024-08-24 DIAGNOSIS — I25.2 HISTORY OF ST ELEVATION MYOCARDIAL INFARCTION (STEMI): ICD-10-CM

## 2024-08-24 DIAGNOSIS — E11.9 TYPE 2 DIABETES MELLITUS WITHOUT COMPLICATION, WITHOUT LONG-TERM CURRENT USE OF INSULIN: ICD-10-CM

## 2024-08-26 RX ORDER — METOPROLOL SUCCINATE 25 MG/1
25 TABLET, EXTENDED RELEASE ORAL DAILY
Qty: 30 TABLET | Refills: 5 | Status: SHIPPED | OUTPATIENT
Start: 2024-08-26

## 2024-08-28 ENCOUNTER — TELEPHONE (OUTPATIENT)
Dept: FAMILY MEDICINE CLINIC | Facility: CLINIC | Age: 64
End: 2024-08-28
Payer: COMMERCIAL

## 2024-09-06 ENCOUNTER — HOSPITAL ENCOUNTER (OUTPATIENT)
Facility: HOSPITAL | Age: 64
Setting detail: HOSPITAL OUTPATIENT SURGERY
Discharge: HOME | End: 2024-09-06
Attending: INTERNAL MEDICINE | Admitting: INTERNAL MEDICINE
Payer: COMMERCIAL

## 2024-09-06 VITALS
DIASTOLIC BLOOD PRESSURE: 80 MMHG | TEMPERATURE: 96.8 F | HEART RATE: 61 BPM | WEIGHT: 212 LBS | BODY MASS INDEX: 32.13 KG/M2 | HEIGHT: 68 IN | RESPIRATION RATE: 18 BRPM | OXYGEN SATURATION: 98 % | SYSTOLIC BLOOD PRESSURE: 123 MMHG

## 2024-09-06 DIAGNOSIS — I25.10 CORONARY ARTERY DISEASE INVOLVING NATIVE CORONARY ARTERY OF NATIVE HEART WITHOUT ANGINA PECTORIS: ICD-10-CM

## 2024-09-06 DIAGNOSIS — R94.39 ABNORMAL NUCLEAR STRESS TEST: ICD-10-CM

## 2024-09-06 DIAGNOSIS — E11.9 TYPE 2 DIABETES MELLITUS WITHOUT COMPLICATION, WITHOUT LONG-TERM CURRENT USE OF INSULIN: ICD-10-CM

## 2024-09-06 DIAGNOSIS — I25.2 HISTORY OF ST ELEVATION MYOCARDIAL INFARCTION (STEMI): ICD-10-CM

## 2024-09-06 PROCEDURE — C1769 GUIDE WIRE: HCPCS | Performed by: INTERNAL MEDICINE

## 2024-09-06 PROCEDURE — 85347 COAGULATION TIME ACTIVATED: CPT

## 2024-09-06 PROCEDURE — 93799 UNLISTED CV SVC/PROCEDURE: CPT | Performed by: INTERNAL MEDICINE

## 2024-09-06 PROCEDURE — 25010000002 HEPARIN (PORCINE) PER 1000 UNITS: Performed by: INTERNAL MEDICINE

## 2024-09-06 PROCEDURE — 25810000003 SODIUM CHLORIDE 0.9 % SOLUTION: Performed by: INTERNAL MEDICINE

## 2024-09-06 PROCEDURE — C1894 INTRO/SHEATH, NON-LASER: HCPCS | Performed by: INTERNAL MEDICINE

## 2024-09-06 PROCEDURE — 93458 L HRT ARTERY/VENTRICLE ANGIO: CPT | Performed by: INTERNAL MEDICINE

## 2024-09-06 PROCEDURE — 25510000001 IOPAMIDOL PER 1 ML: Performed by: INTERNAL MEDICINE

## 2024-09-06 PROCEDURE — C1887 CATHETER, GUIDING: HCPCS | Performed by: INTERNAL MEDICINE

## 2024-09-06 PROCEDURE — 25010000002 MIDAZOLAM PER 1 MG: Performed by: INTERNAL MEDICINE

## 2024-09-06 PROCEDURE — 25010000002 FENTANYL CITRATE (PF) 50 MCG/ML SOLUTION: Performed by: INTERNAL MEDICINE

## 2024-09-06 PROCEDURE — 93571 IV DOP VEL&/PRESS C FLO 1ST: CPT | Performed by: INTERNAL MEDICINE

## 2024-09-06 RX ORDER — VERAPAMIL HYDROCHLORIDE 2.5 MG/ML
INJECTION, SOLUTION INTRAVENOUS
Status: DISCONTINUED | OUTPATIENT
Start: 2024-09-06 | End: 2024-09-06 | Stop reason: HOSPADM

## 2024-09-06 RX ORDER — LIDOCAINE HYDROCHLORIDE 20 MG/ML
INJECTION, SOLUTION INFILTRATION; PERINEURAL
Status: DISCONTINUED | OUTPATIENT
Start: 2024-09-06 | End: 2024-09-06 | Stop reason: HOSPADM

## 2024-09-06 RX ORDER — FENTANYL CITRATE 50 UG/ML
INJECTION, SOLUTION INTRAMUSCULAR; INTRAVENOUS
Status: DISCONTINUED | OUTPATIENT
Start: 2024-09-06 | End: 2024-09-06 | Stop reason: HOSPADM

## 2024-09-06 RX ORDER — MIDAZOLAM HYDROCHLORIDE 1 MG/ML
INJECTION INTRAMUSCULAR; INTRAVENOUS
Status: DISCONTINUED | OUTPATIENT
Start: 2024-09-06 | End: 2024-09-06 | Stop reason: HOSPADM

## 2024-09-06 RX ORDER — ACETAMINOPHEN 325 MG/1
650 TABLET ORAL EVERY 4 HOURS PRN
Status: CANCELLED | OUTPATIENT
Start: 2024-09-06

## 2024-09-06 RX ORDER — HEPARIN SODIUM 1000 [USP'U]/ML
INJECTION, SOLUTION INTRAVENOUS; SUBCUTANEOUS
Status: DISCONTINUED | OUTPATIENT
Start: 2024-09-06 | End: 2024-09-06 | Stop reason: HOSPADM

## 2024-09-06 RX ORDER — NITROGLYCERIN 0.4 MG/1
0.4 TABLET SUBLINGUAL
Status: CANCELLED | OUTPATIENT
Start: 2024-09-06

## 2024-09-06 RX ORDER — IOPAMIDOL 755 MG/ML
INJECTION, SOLUTION INTRAVASCULAR
Status: DISCONTINUED | OUTPATIENT
Start: 2024-09-06 | End: 2024-09-06 | Stop reason: HOSPADM

## 2024-09-06 RX ORDER — SODIUM CHLORIDE 0.9 % (FLUSH) 0.9 %
10 SYRINGE (ML) INJECTION EVERY 12 HOURS SCHEDULED
Status: DISCONTINUED | OUTPATIENT
Start: 2024-09-06 | End: 2024-09-06 | Stop reason: HOSPADM

## 2024-09-06 RX ORDER — SODIUM CHLORIDE 9 MG/ML
75 INJECTION, SOLUTION INTRAVENOUS CONTINUOUS
Status: DISCONTINUED | OUTPATIENT
Start: 2024-09-06 | End: 2024-09-06 | Stop reason: HOSPADM

## 2024-09-06 RX ORDER — SENNOSIDES 8.6 MG
650 CAPSULE ORAL EVERY 8 HOURS PRN
COMMUNITY

## 2024-09-06 RX ADMIN — SODIUM CHLORIDE 75 ML/HR: 9 INJECTION, SOLUTION INTRAVENOUS at 07:41

## 2024-09-06 NOTE — LETTER
September 6, 2024     Patient: Nba Gaffney   YOB: 1960   Date of Visit: 8/13/2024       To Whom It May Concern:    It is my medical opinion that Nba Gaffney is restricted to no use of right arm or hand for 24 hours and then he is not to lift, push or pull anything greater than 5 pounds for 5 days. He may then return to work.           Sincerely,  [facility]      No name on file    CC:   No Recipients

## 2024-09-06 NOTE — Clinical Note
The right DP pulse is +2. The right PT pulse is +1. The right radial pulse is +1. The right ulnar pulse is +1. The right femoral pulse is +2.

## 2024-09-06 NOTE — DISCHARGE INSTRUCTIONS
[facility]  [address] [place] [address]    Coronary Angiogram (Radial/Ulnar Approach) After Care    Refer to this sheet in the next few weeks. These instructions provide you with information on caring for yourself after your procedure. Your caregiver may also give you more specific instructions. Your treatment has been planned according to current medical practices, but problems sometimes occur. Call your caregiver if you have any problems or questions after your procedure.    Home Care Instructions:  You may shower the day after the procedure. Remove the bandage (dressing) and gently wash the site with plain soap and water. Gently pat the site dry. You may apply a band aid daily for 2 days if desired.    Do not apply powder or lotion to the site.  Do not submerge the affected site in water for 3 to 5 days or until the site is completely healed.   Do not lift, push or pull anything over 5 pounds for 5 days after your procedure or as directed by your physician.  As a reference, a gallon of milk weighs 8 pounds.   Inspect the site at least twice daily. You may notice some bruising at the site and it may be tender for 1 to 2 weeks.     Increase your fluid intake for the next 2 days.    Keep arm elevated for 24 hours. For the remainder of the day, keep your arm in “Pledge of Allegiance” position when up and about.     You may drive 24 hours after the procedure unless otherwise instructed by your caregiver.  Do not operate machinery or power tools for 24 hours.  A responsible adult should be with you for the first 24 hours after you arrive home. Do not make any important legal decisions or sign legal papers for 24 hours.  Do not drink alcohol for 24 hours.    Metformin or any medications containing Metformin should not be taken for 48 hours after your procedure.      Call Your Doctor if:   You have unusual pain at the radial/ulnar (wrist) site.  You have redness, warmth, swelling, or pain at the  radial/ulnar (wrist) site.  You have drainage (other than a small amount of blood on the dressing).  `You have chills or a fever > 101.  Your arm becomes pale or dark, cool, tingly, or numb.  You develop chest pain, shortness of breath, feel faint or pass out.    You have heavy bleeding from the site, hold pressure on the site for 20 minutes.  If the bleeding stops, apply a fresh bandage and call your cardiologist.  However, if you        continue to have bleeding, call 911 and continue to apply pressure to the site.   You have any symptoms of a stroke.  Remember BE FAST  B-balance. Sudden trouble walking or loss of balance.  E-eyes.  Sudden changes in how you see or a sudden onset of a very bad headache.   F-face. Sudden weakness or loss of feeling of the face or facial droop on one side.   A-arms Sudden weakness or numbness in one arm.  One arm drifts down if they are both held out in front of you. This happens suddenly and usually on one side of the body.   S-speech.  Sudden trouble speaking, slurred speech or trouble understanding what are saying.   T-time  Time to call emergency services.  Write down the symptoms and the time they started.

## 2024-09-09 LAB — ACT BLD: 232 SECONDS (ref 82–152)

## 2024-10-01 DIAGNOSIS — E11.9 TYPE 2 DIABETES MELLITUS WITHOUT COMPLICATION, WITHOUT LONG-TERM CURRENT USE OF INSULIN: ICD-10-CM

## 2024-10-01 DIAGNOSIS — I25.2 HISTORY OF ST ELEVATION MYOCARDIAL INFARCTION (STEMI): ICD-10-CM

## 2024-10-04 ENCOUNTER — HOSPITAL ENCOUNTER (INPATIENT)
Facility: HOSPITAL | Age: 64
LOS: 2 days | Discharge: HOME | End: 2024-10-07
Attending: EMERGENCY MEDICINE | Admitting: INTERNAL MEDICINE
Payer: COMMERCIAL

## 2024-10-04 ENCOUNTER — APPOINTMENT (OUTPATIENT)
Dept: GENERAL RADIOLOGY | Facility: HOSPITAL | Age: 64
End: 2024-10-04
Payer: COMMERCIAL

## 2024-10-04 DIAGNOSIS — R50.9 FEVER AND CHILLS: Primary | ICD-10-CM

## 2024-10-04 LAB
ALBUMIN SERPL-MCNC: 4.6 G/DL (ref 3.5–5.2)
ALBUMIN/GLOB SERPL: 1.4 G/DL
ALP SERPL-CCNC: 68 U/L (ref 39–117)
ALT SERPL W P-5'-P-CCNC: 31 U/L (ref 1–41)
ANION GAP SERPL CALCULATED.3IONS-SCNC: 14.8 MMOL/L (ref 5–15)
AST SERPL-CCNC: 27 U/L (ref 1–40)
BACTERIA UR QL AUTO: NORMAL /HPF
BASOPHILS # BLD AUTO: 0.08 10*3/MM3 (ref 0–0.2)
BASOPHILS NFR BLD AUTO: 0.5 % (ref 0–1.5)
BILIRUB SERPL-MCNC: 0.8 MG/DL (ref 0–1.2)
BILIRUB UR QL STRIP: NEGATIVE
BUN SERPL-MCNC: 11 MG/DL (ref 8–23)
BUN/CREAT SERPL: 11.3 (ref 7–25)
CALCIUM SPEC-SCNC: 9.5 MG/DL (ref 8.6–10.5)
CHLORIDE SERPL-SCNC: 95 MMOL/L (ref 98–107)
CLARITY UR: CLEAR
CO2 SERPL-SCNC: 23.2 MMOL/L (ref 22–29)
COLOR UR: YELLOW
CREAT SERPL-MCNC: 0.97 MG/DL (ref 0.76–1.27)
D-LACTATE SERPL-SCNC: 1.6 MMOL/L (ref 0.5–2)
DEPRECATED RDW RBC AUTO: 45.2 FL (ref 37–54)
EGFRCR SERPLBLD CKD-EPI 2021: 88.8 ML/MIN/1.73
EOSINOPHIL # BLD AUTO: 0.01 10*3/MM3 (ref 0–0.4)
EOSINOPHIL NFR BLD AUTO: 0.1 % (ref 0.3–6.2)
ERYTHROCYTE [DISTWIDTH] IN BLOOD BY AUTOMATED COUNT: 13.5 % (ref 12.3–15.4)
FLUAV SUBTYP SPEC NAA+PROBE: NOT DETECTED
FLUBV RNA ISLT QL NAA+PROBE: NOT DETECTED
GLOBULIN UR ELPH-MCNC: 3.4 GM/DL
GLUCOSE SERPL-MCNC: 123 MG/DL (ref 65–99)
GLUCOSE UR STRIP-MCNC: NEGATIVE MG/DL
HCT VFR BLD AUTO: 45.7 % (ref 37.5–51)
HGB BLD-MCNC: 15.7 G/DL (ref 13–17.7)
HGB UR QL STRIP.AUTO: NEGATIVE
HOLD SPECIMEN: NORMAL
HOLD SPECIMEN: NORMAL
HYALINE CASTS UR QL AUTO: NORMAL /LPF
IMM GRANULOCYTES # BLD AUTO: 0.09 10*3/MM3 (ref 0–0.05)
IMM GRANULOCYTES NFR BLD AUTO: 0.5 % (ref 0–0.5)
KETONES UR QL STRIP: ABNORMAL
LEUKOCYTE ESTERASE UR QL STRIP.AUTO: NEGATIVE
LYMPHOCYTES # BLD AUTO: 0.77 10*3/MM3 (ref 0.7–3.1)
LYMPHOCYTES NFR BLD AUTO: 4.5 % (ref 19.6–45.3)
MAGNESIUM SERPL-MCNC: 1.5 MG/DL (ref 1.6–2.4)
MCH RBC QN AUTO: 30.9 PG (ref 26.6–33)
MCHC RBC AUTO-ENTMCNC: 34.4 G/DL (ref 31.5–35.7)
MCV RBC AUTO: 90 FL (ref 79–97)
MONOCYTES # BLD AUTO: 0.06 10*3/MM3 (ref 0.1–0.9)
MONOCYTES NFR BLD AUTO: 0.3 % (ref 5–12)
NEUTROPHILS NFR BLD AUTO: 16.28 10*3/MM3 (ref 1.7–7)
NEUTROPHILS NFR BLD AUTO: 94.1 % (ref 42.7–76)
NITRITE UR QL STRIP: NEGATIVE
NRBC BLD AUTO-RTO: 0 /100 WBC (ref 0–0.2)
PH UR STRIP.AUTO: 6 [PH] (ref 5–8)
PLATELET # BLD AUTO: 345 10*3/MM3 (ref 140–450)
PMV BLD AUTO: 9.1 FL (ref 6–12)
POTASSIUM SERPL-SCNC: 3.7 MMOL/L (ref 3.5–5.2)
PROT SERPL-MCNC: 8 G/DL (ref 6–8.5)
PROT UR QL STRIP: ABNORMAL
QT INTERVAL: 299 MS
QTC INTERVAL: 439 MS
RBC # BLD AUTO: 5.08 10*6/MM3 (ref 4.14–5.8)
RBC # UR STRIP: NORMAL /HPF
REF LAB TEST METHOD: NORMAL
RSV RNA NPH QL NAA+NON-PROBE: NOT DETECTED
SARS-COV-2 RNA RESP QL NAA+PROBE: NOT DETECTED
SODIUM SERPL-SCNC: 133 MMOL/L (ref 136–145)
SP GR UR STRIP: 1.02 (ref 1–1.03)
SQUAMOUS #/AREA URNS HPF: NORMAL /HPF
TROPONIN T SERPL HS-MCNC: 16 NG/L
UROBILINOGEN UR QL STRIP: ABNORMAL
WBC # UR STRIP: NORMAL /HPF
WBC NRBC COR # BLD AUTO: 17.29 10*3/MM3 (ref 3.4–10.8)
WHOLE BLOOD HOLD COAG: NORMAL
WHOLE BLOOD HOLD SPECIMEN: NORMAL

## 2024-10-04 PROCEDURE — 25010000002 ONDANSETRON PER 1 MG: Performed by: EMERGENCY MEDICINE

## 2024-10-04 PROCEDURE — 87154 CUL TYP ID BLD PTHGN 6+ TRGT: CPT | Performed by: EMERGENCY MEDICINE

## 2024-10-04 PROCEDURE — 93005 ELECTROCARDIOGRAM TRACING: CPT

## 2024-10-04 PROCEDURE — 87186 SC STD MICRODIL/AGAR DIL: CPT | Performed by: EMERGENCY MEDICINE

## 2024-10-04 PROCEDURE — 81001 URINALYSIS AUTO W/SCOPE: CPT | Performed by: EMERGENCY MEDICINE

## 2024-10-04 PROCEDURE — 83735 ASSAY OF MAGNESIUM: CPT | Performed by: EMERGENCY MEDICINE

## 2024-10-04 PROCEDURE — 83605 ASSAY OF LACTIC ACID: CPT | Performed by: EMERGENCY MEDICINE

## 2024-10-04 PROCEDURE — 85025 COMPLETE CBC W/AUTO DIFF WBC: CPT

## 2024-10-04 PROCEDURE — 93005 ELECTROCARDIOGRAM TRACING: CPT | Performed by: EMERGENCY MEDICINE

## 2024-10-04 PROCEDURE — 36415 COLL VENOUS BLD VENIPUNCTURE: CPT

## 2024-10-04 PROCEDURE — 87637 SARSCOV2&INF A&B&RSV AMP PRB: CPT | Performed by: EMERGENCY MEDICINE

## 2024-10-04 PROCEDURE — 25010000002 KETOROLAC TROMETHAMINE PER 15 MG: Performed by: EMERGENCY MEDICINE

## 2024-10-04 PROCEDURE — 71045 X-RAY EXAM CHEST 1 VIEW: CPT

## 2024-10-04 PROCEDURE — 84484 ASSAY OF TROPONIN QUANT: CPT | Performed by: EMERGENCY MEDICINE

## 2024-10-04 PROCEDURE — 80053 COMPREHEN METABOLIC PANEL: CPT | Performed by: EMERGENCY MEDICINE

## 2024-10-04 PROCEDURE — 25810000003 SODIUM CHLORIDE 0.9 % SOLUTION: Performed by: EMERGENCY MEDICINE

## 2024-10-04 PROCEDURE — 99291 CRITICAL CARE FIRST HOUR: CPT

## 2024-10-04 PROCEDURE — 87040 BLOOD CULTURE FOR BACTERIA: CPT

## 2024-10-04 PROCEDURE — 87077 CULTURE AEROBIC IDENTIFY: CPT | Performed by: EMERGENCY MEDICINE

## 2024-10-04 RX ORDER — ONDANSETRON 2 MG/ML
4 INJECTION INTRAMUSCULAR; INTRAVENOUS ONCE
Status: COMPLETED | OUTPATIENT
Start: 2024-10-04 | End: 2024-10-04

## 2024-10-04 RX ORDER — ACETAMINOPHEN 325 MG/1
650 TABLET ORAL ONCE
Status: COMPLETED | OUTPATIENT
Start: 2024-10-04 | End: 2024-10-04

## 2024-10-04 RX ORDER — KETOROLAC TROMETHAMINE 15 MG/ML
15 INJECTION, SOLUTION INTRAMUSCULAR; INTRAVENOUS ONCE
Status: COMPLETED | OUTPATIENT
Start: 2024-10-04 | End: 2024-10-04

## 2024-10-04 RX ORDER — SODIUM CHLORIDE 0.9 % (FLUSH) 0.9 %
10 SYRINGE (ML) INJECTION AS NEEDED
Status: DISCONTINUED | OUTPATIENT
Start: 2024-10-04 | End: 2024-10-07 | Stop reason: HOSPADM

## 2024-10-04 RX ADMIN — KETOROLAC TROMETHAMINE 15 MG: 15 INJECTION, SOLUTION INTRAMUSCULAR; INTRAVENOUS at 20:32

## 2024-10-04 RX ADMIN — ONDANSETRON 4 MG: 2 INJECTION INTRAMUSCULAR; INTRAVENOUS at 20:32

## 2024-10-04 RX ADMIN — ACETAMINOPHEN 650 MG: 325 TABLET ORAL at 20:32

## 2024-10-04 RX ADMIN — SODIUM CHLORIDE 1000 ML: 9 INJECTION, SOLUTION INTRAVENOUS at 23:01

## 2024-10-04 RX ADMIN — SODIUM CHLORIDE 1000 ML: 9 INJECTION, SOLUTION INTRAVENOUS at 20:33

## 2024-10-04 NOTE — ED PROVIDER NOTES
Time: 7:41 PM EDT  Date of encounter:  10/4/2024  Independent Historian/Clinical History and Information was obtained by:   Patient    History is limited by: N/A    Chief Complaint: Fever      History of Present Illness:  Patient is a 63 y.o. year old male who presents to the emergency department for evaluation of fever, generalized aching, nausea and headache for the past day.  Patient does report is scant cough.  Patient has no dysuria or urinary frequency.  Patient denies abdominal pain.  Patient has no chest pain or shortness of breath.      Patient Care Team  Primary Care Provider: Eugene Campos MD    Past Medical History:     No Known Allergies  Past Medical History:   Diagnosis Date    Environmental allergies     SEASONAL ALLERGIES    Hyperlipidemia     Hypertension     Incisional hernia 2008    Lumbago     LOW BACK PAIN    Lumbar disc herniation 03/17/2014    L4-5    Lumbar spinal stenosis 03/17/2014    MI (myocardial infarction) 07/28/2021    SURGERY - 2 STENTS PLACED  7-2021- SEE'S DR Gonzales, DENIED  CP/SOB    Shoulder pain, left      Past Surgical History:   Procedure Laterality Date    CARDIAC CATHETERIZATION N/A 07/29/2021    Procedure: Left Heart Cath;  Surgeon: Yulissa Gonzales MD;  Location: [facility] CATH INVASIVE LOCATION;  Service: Cardiovascular;  Laterality: N/A;    CARDIAC CATHETERIZATION N/A 07/29/2021    Procedure: Coronary angiography;  Surgeon: Yulissa Gonzales MD;  [facility];  Service: Cardiovascular;  Laterality: N/A;    CARDIAC CATHETERIZATION N/A 07/29/2021    Procedure: Stent KAYY coronary;  Surgeon: Yulissa Gonzales MD;  [facility];  Service: Cardiovascular;  Laterality: N/A;    CARDIAC CATHETERIZATION N/A 07/29/2021    Procedure: Left ventriculography;  Surgeon: Yulissa Gonzales MD;  [facility];  Service: Cardiovascular;  Laterality: N/A;    CARDIAC CATHETERIZATION   07/29/2021    Procedure: Percutaneous Manual Thrombectomy - Gilman;  Surgeon: Yulissa Gonzales MD;  [facility];  Service: Cardiovascular;;    CARDIAC CATHETERIZATION N/A 9/6/2024    Procedure: Left Heart Cath;  Surgeon: Yulissa Gonzales MD;  [facility];  Service: Cardiovascular;  Laterality: N/A;    CARDIAC CATHETERIZATION N/A 9/6/2024    Procedure: Coronary angiography;  Surgeon: Yulissa Gonzales MD;  [facility];  Service: Cardiovascular;  Laterality: N/A;    CARDIAC CATHETERIZATION N/A 9/6/2024    Procedure: Resting Full Cycle Ratio;  Surgeon: Yulissa Gonzales MD;  Location: [facility];  Service: Cardiovascular;  Laterality: N/A;    CORONARY ANGIOPLASTY WITH STENT PLACEMENT      ELBOW PROCEDURE Right     FEMUR FRACTURE SURGERY Left     HIP SURGERY Left     LEG SURGERY      PERIPHERAL ARTERIAL STENT GRAFT      ROTATOR CUFF REPAIR Right     SHOULDER ARTHROSCOPY W/ ROTATOR CUFF REPAIR Left 01/15/2021    Procedure: SHOULDER ARTHROSCOPY WITH ROTATOR CUFF REPAIR;  Surgeon: Noah Shaw MD;  [facility];  Service: Orthopedics;  Laterality: Left;    SPLENECTOMY      WRIST SURGERY Right     wrist orif     Family History   Problem Relation Age of Onset    Malig Hyperthermia Neg Hx        Home Medications:  Prior to Admission medications    Medication Sig Start Date End Date Taking? Authorizing Provider   acetaminophen (TYLENOL) 650 MG 8 hr tablet Take 1 tablet by mouth Every 8 (Eight) Hours As Needed for Mild Pain.    Provider, MD Myra   amoxicillin-clavulanate (AUGMENTIN) 875-125 MG per tablet Take 1 tablet by mouth 2 (Two) Times a Day. 8/28/24   Eugene Campos MD   aspirin (Aspirin Low Dose) 81 MG EC tablet Take 1 tablet by mouth Daily. 8/14/24   Eugene Campos MD   atorvastatin (LIPITOR) 40 MG tablet Take 1 tablet by mouth Every Night. 5/24/24   Eugene Campos MD   metFORMIN  "(GLUCOPHAGE) 850 MG tablet Take 1 tablet by mouth 2 (Two) Times a Day With Meals. 10/1/24   Eugene Campos MD   metoprolol succinate XL (TOPROL-XL) 25 MG 24 hr tablet Take 1 tablet by mouth Daily. Needs appt 8/26/24   Yulissa Gonzales MD   multivitamin with minerals (MULTIVITAMIN ADULT PO) Take 1 tablet by mouth Daily.    Provider, Myra, MD        Social History:   Social History     Tobacco Use    Smoking status: Every Day     Current packs/day: 0.50     Average packs/day: 0.5 packs/day for 46.4 years (23.2 ttl pk-yrs)     Types: Cigarettes     Start date: 5/7/1978    Smokeless tobacco: Never   Vaping Use    Vaping status: Never Used   Substance Use Topics    Alcohol use: Not Currently    Drug use: Not Currently         Review of Systems:  Review of Systems   Constitutional:  Positive for fever. Negative for chills.   HENT:  Negative for congestion, rhinorrhea and sore throat.    Eyes:  Negative for pain and visual disturbance.   Respiratory:  Negative for apnea, cough, chest tightness and shortness of breath.    Cardiovascular:  Negative for chest pain and palpitations.   Gastrointestinal:  Negative for abdominal pain, diarrhea, nausea and vomiting.   Genitourinary:  Negative for difficulty urinating and dysuria.   Musculoskeletal:  Negative for joint swelling and myalgias.   Skin:  Negative for color change.   Neurological:  Negative for seizures and headaches.   Psychiatric/Behavioral: Negative.     All other systems reviewed and are negative.       Physical Exam:  BP 90/59   Pulse 111   Temp 100.5 °F (38.1 °C)   Resp 22   Ht 172.7 cm (68\")   Wt 95.4 kg (210 lb 5.1 oz)   SpO2 92%   BMI 31.98 kg/m²     Physical Exam  Vitals and nursing note reviewed.   Constitutional:       General: He is not in acute distress.     Appearance: Normal appearance. He is not toxic-appearing.   HENT:      Head: Normocephalic and atraumatic.      Jaw: There is normal jaw occlusion.   Eyes:      General: Lids are " normal.      Extraocular Movements: Extraocular movements intact.      Conjunctiva/sclera: Conjunctivae normal.      Pupils: Pupils are equal, round, and reactive to light.   Cardiovascular:      Rate and Rhythm: Normal rate and regular rhythm.      Pulses: Normal pulses.      Heart sounds: Normal heart sounds.   Pulmonary:      Effort: Pulmonary effort is normal. No respiratory distress.      Breath sounds: Normal breath sounds. No wheezing or rhonchi.   Abdominal:      General: Abdomen is flat.      Palpations: Abdomen is soft.      Tenderness: There is no abdominal tenderness. There is no guarding or rebound.   Musculoskeletal:         General: Normal range of motion.      Cervical back: Normal range of motion and neck supple.      Right lower leg: No edema.      Left lower leg: No edema.   Skin:     General: Skin is warm and dry.   Neurological:      Mental Status: He is alert and oriented to person, place, and time. Mental status is at baseline.   Psychiatric:         Mood and Affect: Mood normal.                  Procedures:  Procedures      Medical Decision Making:      Comorbidities that affect care:    Hypertension    External Notes reviewed:    Previous Clinic Note: Patient was last seen in clinic for COVID-like symptoms      The following orders were placed and all results were independently analyzed by me:  Orders Placed This Encounter   Procedures    Blood Culture - Blood,    Blood Culture - Blood,    COVID-19, FLU A/B, RSV PCR 1 HR TAT - Swab, Nasopharynx    XR Chest 1 View    Delaplane Draw    Comprehensive Metabolic Panel    Single High Sensitivity Troponin T    Magnesium    Urinalysis With Microscopic If Indicated (No Culture) - Urine, Clean Catch    Lactic Acid, Plasma    CBC Auto Differential    Urinalysis, Microscopic Only - Urine, Clean Catch    NPO Diet NPO Type: Strict NPO    Undress & Gown    Continuous Pulse Oximetry    Vital Signs    Orthostatic Blood Pressure    Straight Cath    Oxygen  Therapy- Nasal Cannula; Titrate 1-6 LPM Per SpO2; 90 - 95%    POC Glucose Once    ECG 12 Lead ED Triage Standing Order; Weak / Dizzy / AMS    Insert Peripheral IV    Fall Precautions    CBC & Differential    Green Top (Gel)    Lavender Top    Gold Top - SST    Light Blue Top       Medications Given in the Emergency Department:  Medications   sodium chloride 0.9 % flush 10 mL (has no administration in time range)   sodium chloride 0.9 % bolus 1,000 mL (has no administration in time range)   acetaminophen (TYLENOL) tablet 650 mg (650 mg Oral Given 10/4/24 2032)   ketorolac (TORADOL) injection 15 mg (15 mg Intravenous Given 10/4/24 2032)   ondansetron (ZOFRAN) injection 4 mg (4 mg Intravenous Given 10/4/24 2032)   sodium chloride 0.9 % bolus 1,000 mL (0 mL Intravenous Stopped 10/4/24 2103)        ED Course:         Labs:    Lab Results (last 24 hours)       Procedure Component Value Units Date/Time    CBC & Differential [[id number]]  (Abnormal) Collected: 10/04/24 1846    Specimen: Blood Updated: 10/04/24 1901    Narrative:      The following orders were created for panel order CBC & Differential.  Procedure                               Abnormality         Status                     ---------                               -----------         ------                     CBC Auto Differential[[id number]]        Abnormal            Final result                 Please view results for these tests on the individual orders.    Comprehensive Metabolic Panel [[id number]]  (Abnormal) Collected: 10/04/24 1846    Specimen: Blood Updated: 10/04/24 1923     Glucose 123 mg/dL      BUN 11 mg/dL      Creatinine 0.97 mg/dL      Sodium 133 mmol/L      Potassium 3.7 mmol/L      Chloride 95 mmol/L      CO2 23.2 mmol/L      Calcium 9.5 mg/dL      Total Protein 8.0 g/dL      Albumin 4.6 g/dL      ALT (SGPT) 31 U/L      AST (SGOT) 27 U/L      Alkaline Phosphatase 68 U/L      Total Bilirubin 0.8 mg/dL      Globulin 3.4 gm/dL      A/G Ratio 1.4  g/dL      BUN/Creatinine Ratio 11.3     Anion Gap 14.8 mmol/L      eGFR 88.8 mL/min/1.73     Narrative:      GFR Normal >60  Chronic Kidney Disease <60  Kidney Failure <15      Single High Sensitivity Troponin T [[id number]]  (Normal) Collected: 10/04/24 1846    Specimen: Blood Updated: 10/04/24 1923     HS Troponin T 16 ng/L     Narrative:      High Sensitive Troponin T Reference Range:  <14.0 ng/L- Negative Female for AMI  <22.0 ng/L- Negative Male for AMI  >=14 - Abnormal Female indicating possible myocardial injury.  >=22 - Abnormal Male indicating possible myocardial injury.   Clinicians would have to utilize clinical acumen, EKG, Troponin, and serial changes to determine if it is an Acute Myocardial Infarction or myocardial injury due to an underlying chronic condition.         Magnesium [[id number]]  (Abnormal) Collected: 10/04/24 1846    Specimen: Blood Updated: 10/04/24 1923     Magnesium 1.5 mg/dL     Blood Culture - Blood, Arm, Right [[id number]] Collected: 10/04/24 1846    Specimen: Blood from Arm, Right Updated: 10/04/24 1855    Blood Culture - Blood, Arm, Left [[id number]] Collected: 10/04/24 1846    Specimen: Blood from Arm, Left Updated: 10/04/24 1855    Lactic Acid, Plasma [[id number]]  (Normal) Collected: 10/04/24 1846    Specimen: Blood Updated: 10/04/24 1918     Lactate 1.6 mmol/L     CBC Auto Differential [[id number]]  (Abnormal) Collected: 10/04/24 1846    Specimen: Blood Updated: 10/04/24 1901     WBC 17.29 10*3/mm3      RBC 5.08 10*6/mm3      Hemoglobin 15.7 g/dL      Hematocrit 45.7 %      MCV 90.0 fL      MCH 30.9 pg      MCHC 34.4 g/dL      RDW 13.5 %      RDW-SD 45.2 fl      MPV 9.1 fL      Platelets 345 10*3/mm3      Neutrophil % 94.1 %      Lymphocyte % 4.5 %      Monocyte % 0.3 %      Eosinophil % 0.1 %      Basophil % 0.5 %      Immature Grans % 0.5 %      Neutrophils, Absolute 16.28 10*3/mm3      Lymphocytes, Absolute 0.77 10*3/mm3      Monocytes, Absolute 0.06 10*3/mm3      Eosinophils,  Absolute 0.01 10*3/mm3      Basophils, Absolute 0.08 10*3/mm3      Immature Grans, Absolute 0.09 10*3/mm3      nRBC 0.0 /100 WBC     COVID-19, FLU A/B, RSV PCR 1 HR TAT - Swab, Nasopharynx [[id number]]  (Normal) Collected: 10/04/24 1859    Specimen: Swab from Nasopharynx Updated: 10/04/24 1942     COVID19 Not Detected     Influenza A PCR Not Detected     Influenza B PCR Not Detected     RSV, PCR Not Detected    Narrative:      Fact sheet for providers: https://www.fda.gov/media/361525/download    Fact sheet for patients: https://www.fda.gov/media/701543/download    Test performed by PCR.    Urinalysis With Microscopic If Indicated (No Culture) - Urine, Clean Catch [[id number]]  (Abnormal) Collected: 10/04/24 2134    Specimen: Urine, Clean Catch Updated: 10/04/24 2147     Color, UA Yellow     Appearance, UA Clear     pH, UA 6.0     Specific Gravity, UA 1.018     Glucose, UA Negative     Ketones, UA Trace     Bilirubin, UA Negative     Blood, UA Negative     Protein, UA 30 mg/dL (1+)     Leuk Esterase, UA Negative     Nitrite, UA Negative     Urobilinogen, UA 0.2 E.U./dL    Urinalysis, Microscopic Only - Urine, Clean Catch [[id number]] Collected: 10/04/24 2134    Specimen: Urine, Clean Catch Updated: 10/04/24 2147     RBC, UA 0-2 /HPF      WBC, UA 0-2 /HPF      Bacteria, UA None Seen /HPF      Squamous Epithelial Cells, UA 0-2 /HPF      Hyaline Casts, UA 0-2 /LPF      Methodology Automated Microscopy             Imaging:    XR Chest 1 View    Result Date: 10/4/2024  XR CHEST 1 VW Date of Exam: 10/4/2024 6:34 PM EDT Indication: Weak/Dizzy/AMS triage protocol Comparison: Chest CT 9/25/2023 chest radiograph 7/29/2021 Findings: Mediastinum: Cardiomediastinal silhouette appears similar to prior accounting for differences in technique Lungs: The lungs appear clear. Pleura: No pleural effusion or pneumothorax. Bones and soft tissues: No acute osseous abnormality.     Impression: No acute intrathoracic finding. Electronically  Signed: Denny Gifford  10/4/2024 6:58 PM EDT  Workstation ID: [id number]       Differential Diagnosis and Discussion:    Fever: Based on the complaint of fever, differential diagnosis includes but is not limited to meningitis, pneumonia, pyelonephritis, acute uti,  systemic immune response syndrome, sepsis, viral syndrome, fungal infection, tick born illness and other bacterial infections.    All labs were reviewed and interpreted by me.  All X-rays impressions were independently interpreted by me.    MDM     The patient´s CBC that was reviewed and interpreted by me shows no abnormalities of critical concern. Of note, there is no anemia requiring a blood transfusion and the platelet count is acceptable.  The patient´s CMP that was reviewed and interpretted by me shows no abnormalities of critical concern. Of note, the patient´s sodium and potassium are acceptable. The patient´s liver enzymes are unremarkable. The patient´s renal function (creatinine) is preserved. The patient has a normal anion gap.  The patient is resting comfortably and feels better, is alert and in no distress. Influenza swab is negative.  COVID-19 swab is negative..  Patient's blood pressure remained low and he was given 2 L normal saline.    Total Critical Care time of 40 minutes. Total critical care time documented does not include time spent on separately billed procedures for services of nurses or physician assistants. I personally saw and examined the patient. I have reviewed all diagnostic interpretations and treatment plans as written. I was present for the key portions of any procedures performed and the inclusive time noted in any critical care statement. Critical care time includes patient management by me, time spent at the patients bedside,  time to review lab and imaging results, discussing patient care, documentation in the medical record, and time spent with family or caregiver.                Patient Care  Considerations:    None    Consultants/Shared Management Plan:    Case was discussed with Dr. Sanchez who agrees to admit the patient.    Social Determinants of Health:    Patient is independent, reliable, and has access to care.       Disposition and Care Coordination:    Admitted       Final diagnoses:   Fever and chills        ED Disposition       ED Disposition   Discharge    Condition   Stable    Comment   --               This medical record created using voice recognition software.             Sergio Lea MD  10/04/24 2258       Sergio Lea MD  10/05/24 0147

## 2024-10-05 ENCOUNTER — APPOINTMENT (OUTPATIENT)
Dept: CT IMAGING | Facility: HOSPITAL | Age: 64
End: 2024-10-05
Payer: COMMERCIAL

## 2024-10-05 PROBLEM — A41.9 SEPTIC SHOCK: Status: ACTIVE | Noted: 2024-10-05

## 2024-10-05 PROBLEM — R65.21 SEPTIC SHOCK: Status: ACTIVE | Noted: 2024-10-05

## 2024-10-05 LAB
ANION GAP SERPL CALCULATED.3IONS-SCNC: 15.4 MMOL/L (ref 5–15)
BACTERIA BLD CULT: ABNORMAL
BOTTLE TYPE: ABNORMAL
BUN SERPL-MCNC: 16 MG/DL (ref 8–23)
BUN/CREAT SERPL: 14.2 (ref 7–25)
CALCIUM SPEC-SCNC: 8.1 MG/DL (ref 8.6–10.5)
CHLORIDE SERPL-SCNC: 100 MMOL/L (ref 98–107)
CO2 SERPL-SCNC: 18.6 MMOL/L (ref 22–29)
CREAT SERPL-MCNC: 1.13 MG/DL (ref 0.76–1.27)
CRP SERPL-MCNC: 9.61 MG/DL (ref 0–0.5)
DEPRECATED RDW RBC AUTO: 47.7 FL (ref 37–54)
EGFRCR SERPLBLD CKD-EPI 2021: 73.9 ML/MIN/1.73
ERYTHROCYTE [DISTWIDTH] IN BLOOD BY AUTOMATED COUNT: 14.1 % (ref 12.3–15.4)
GEN 5 2HR TROPONIN T REFLEX: 16 NG/L
GLUCOSE BLDC GLUCOMTR-MCNC: 113 MG/DL (ref 70–99)
GLUCOSE BLDC GLUCOMTR-MCNC: 128 MG/DL (ref 70–99)
GLUCOSE BLDC GLUCOMTR-MCNC: 163 MG/DL (ref 70–99)
GLUCOSE BLDC GLUCOMTR-MCNC: 165 MG/DL (ref 70–99)
GLUCOSE SERPL-MCNC: 142 MG/DL (ref 65–99)
HCT VFR BLD AUTO: 41 % (ref 37.5–51)
HGB BLD-MCNC: 13.8 G/DL (ref 13–17.7)
MAGNESIUM SERPL-MCNC: 1.2 MG/DL (ref 1.6–2.4)
MCH RBC QN AUTO: 31.1 PG (ref 26.6–33)
MCHC RBC AUTO-ENTMCNC: 33.7 G/DL (ref 31.5–35.7)
MCV RBC AUTO: 92.3 FL (ref 79–97)
MRSA DNA SPEC QL NAA+PROBE: NORMAL
PHOSPHATE SERPL-MCNC: 3.5 MG/DL (ref 2.5–4.5)
PLATELET # BLD AUTO: 270 10*3/MM3 (ref 140–450)
PMV BLD AUTO: 9.5 FL (ref 6–12)
POTASSIUM SERPL-SCNC: 3.8 MMOL/L (ref 3.5–5.2)
PROCALCITONIN SERPL-MCNC: 25.69 NG/ML (ref 0–0.25)
RBC # BLD AUTO: 4.44 10*6/MM3 (ref 4.14–5.8)
SODIUM SERPL-SCNC: 134 MMOL/L (ref 136–145)
TROPONIN T DELTA: -2 NG/L
TROPONIN T SERPL HS-MCNC: 18 NG/L
WBC NRBC COR # BLD AUTO: 34.36 10*3/MM3 (ref 3.4–10.8)

## 2024-10-05 PROCEDURE — 25010000002 CEFTRIAXONE PER 250 MG: Performed by: EMERGENCY MEDICINE

## 2024-10-05 PROCEDURE — 25010000002 PIPERACILLIN SOD-TAZOBACTAM PER 1 G: Performed by: STUDENT IN AN ORGANIZED HEALTH CARE EDUCATION/TRAINING PROGRAM

## 2024-10-05 PROCEDURE — 25810000003 LACTATED RINGERS PER 1000 ML: Performed by: STUDENT IN AN ORGANIZED HEALTH CARE EDUCATION/TRAINING PROGRAM

## 2024-10-05 PROCEDURE — 99291 CRITICAL CARE FIRST HOUR: CPT | Performed by: INTERNAL MEDICINE

## 2024-10-05 PROCEDURE — 94799 UNLISTED PULMONARY SVC/PX: CPT

## 2024-10-05 PROCEDURE — 25810000003 SODIUM CHLORIDE 0.9 % SOLUTION: Performed by: STUDENT IN AN ORGANIZED HEALTH CARE EDUCATION/TRAINING PROGRAM

## 2024-10-05 PROCEDURE — 71250 CT THORAX DX C-: CPT

## 2024-10-05 PROCEDURE — 82948 REAGENT STRIP/BLOOD GLUCOSE: CPT | Performed by: STUDENT IN AN ORGANIZED HEALTH CARE EDUCATION/TRAINING PROGRAM

## 2024-10-05 PROCEDURE — 85027 COMPLETE CBC AUTOMATED: CPT | Performed by: STUDENT IN AN ORGANIZED HEALTH CARE EDUCATION/TRAINING PROGRAM

## 2024-10-05 PROCEDURE — 84100 ASSAY OF PHOSPHORUS: CPT | Performed by: STUDENT IN AN ORGANIZED HEALTH CARE EDUCATION/TRAINING PROGRAM

## 2024-10-05 PROCEDURE — 83735 ASSAY OF MAGNESIUM: CPT | Performed by: STUDENT IN AN ORGANIZED HEALTH CARE EDUCATION/TRAINING PROGRAM

## 2024-10-05 PROCEDURE — 94664 DEMO&/EVAL PT USE INHALER: CPT

## 2024-10-05 PROCEDURE — 82948 REAGENT STRIP/BLOOD GLUCOSE: CPT

## 2024-10-05 PROCEDURE — 63710000001 INSULIN LISPRO (HUMAN) PER 5 UNITS: Performed by: STUDENT IN AN ORGANIZED HEALTH CARE EDUCATION/TRAINING PROGRAM

## 2024-10-05 PROCEDURE — 25810000003 SODIUM CHLORIDE 0.9 % SOLUTION: Performed by: INTERNAL MEDICINE

## 2024-10-05 PROCEDURE — 25010000002 AZITHROMYCIN PER 500 MG: Performed by: INTERNAL MEDICINE

## 2024-10-05 PROCEDURE — 86140 C-REACTIVE PROTEIN: CPT

## 2024-10-05 PROCEDURE — 80048 BASIC METABOLIC PNL TOTAL CA: CPT | Performed by: STUDENT IN AN ORGANIZED HEALTH CARE EDUCATION/TRAINING PROGRAM

## 2024-10-05 PROCEDURE — 99223 1ST HOSP IP/OBS HIGH 75: CPT | Performed by: STUDENT IN AN ORGANIZED HEALTH CARE EDUCATION/TRAINING PROGRAM

## 2024-10-05 PROCEDURE — 0 VASOPRESSIN 0.2 UNITS/ML SOLUTION: Performed by: STUDENT IN AN ORGANIZED HEALTH CARE EDUCATION/TRAINING PROGRAM

## 2024-10-05 PROCEDURE — 94760 N-INVAS EAR/PLS OXIMETRY 1: CPT

## 2024-10-05 PROCEDURE — 94640 AIRWAY INHALATION TREATMENT: CPT

## 2024-10-05 PROCEDURE — 84484 ASSAY OF TROPONIN QUANT: CPT | Performed by: STUDENT IN AN ORGANIZED HEALTH CARE EDUCATION/TRAINING PROGRAM

## 2024-10-05 PROCEDURE — 87641 MR-STAPH DNA AMP PROBE: CPT | Performed by: STUDENT IN AN ORGANIZED HEALTH CARE EDUCATION/TRAINING PROGRAM

## 2024-10-05 PROCEDURE — 84145 PROCALCITONIN (PCT): CPT

## 2024-10-05 PROCEDURE — 25010000002 MAGNESIUM SULFATE IN D5W 1G/100ML (PREMIX) 1-5 GM/100ML-% SOLUTION

## 2024-10-05 PROCEDURE — 25010000002 VANCOMYCIN 5 G RECONSTITUTED SOLUTION: Performed by: STUDENT IN AN ORGANIZED HEALTH CARE EDUCATION/TRAINING PROGRAM

## 2024-10-05 RX ORDER — DEXTROSE MONOHYDRATE 25 G/50ML
25 INJECTION, SOLUTION INTRAVENOUS
Status: DISCONTINUED | OUTPATIENT
Start: 2024-10-05 | End: 2024-10-07 | Stop reason: HOSPADM

## 2024-10-05 RX ORDER — SODIUM CHLORIDE 0.9 % (FLUSH) 0.9 %
10 SYRINGE (ML) INJECTION EVERY 12 HOURS SCHEDULED
Status: DISCONTINUED | OUTPATIENT
Start: 2024-10-05 | End: 2024-10-07 | Stop reason: HOSPADM

## 2024-10-05 RX ORDER — SODIUM CHLORIDE 9 MG/ML
40 INJECTION, SOLUTION INTRAVENOUS AS NEEDED
Status: DISCONTINUED | OUTPATIENT
Start: 2024-10-05 | End: 2024-10-07 | Stop reason: HOSPADM

## 2024-10-05 RX ORDER — IPRATROPIUM BROMIDE AND ALBUTEROL SULFATE 2.5; .5 MG/3ML; MG/3ML
3 SOLUTION RESPIRATORY (INHALATION) EVERY 4 HOURS PRN
Status: DISCONTINUED | OUTPATIENT
Start: 2024-10-05 | End: 2024-10-07 | Stop reason: HOSPADM

## 2024-10-05 RX ORDER — POTASSIUM CHLORIDE 750 MG/1
40 CAPSULE, EXTENDED RELEASE ORAL ONCE
Status: COMPLETED | OUTPATIENT
Start: 2024-10-05 | End: 2024-10-05

## 2024-10-05 RX ORDER — ACETAMINOPHEN 325 MG/1
650 TABLET ORAL EVERY 6 HOURS PRN
Status: DISCONTINUED | OUTPATIENT
Start: 2024-10-05 | End: 2024-10-07 | Stop reason: HOSPADM

## 2024-10-05 RX ORDER — BISACODYL 10 MG
10 SUPPOSITORY, RECTAL RECTAL DAILY PRN
Status: DISCONTINUED | OUTPATIENT
Start: 2024-10-05 | End: 2024-10-07 | Stop reason: HOSPADM

## 2024-10-05 RX ORDER — SODIUM CHLORIDE, SODIUM LACTATE, POTASSIUM CHLORIDE, CALCIUM CHLORIDE 600; 310; 30; 20 MG/100ML; MG/100ML; MG/100ML; MG/100ML
125 INJECTION, SOLUTION INTRAVENOUS CONTINUOUS
Status: DISCONTINUED | OUTPATIENT
Start: 2024-10-05 | End: 2024-10-05

## 2024-10-05 RX ORDER — AMOXICILLIN 250 MG
2 CAPSULE ORAL 2 TIMES DAILY PRN
Status: DISCONTINUED | OUTPATIENT
Start: 2024-10-05 | End: 2024-10-07 | Stop reason: HOSPADM

## 2024-10-05 RX ORDER — BUDESONIDE 0.5 MG/2ML
0.5 INHALANT ORAL
Status: DISCONTINUED | OUTPATIENT
Start: 2024-10-05 | End: 2024-10-07 | Stop reason: HOSPADM

## 2024-10-05 RX ORDER — ASPIRIN 81 MG/1
81 TABLET ORAL DAILY
Status: DISCONTINUED | OUTPATIENT
Start: 2024-10-05 | End: 2024-10-07 | Stop reason: HOSPADM

## 2024-10-05 RX ORDER — BISACODYL 5 MG/1
5 TABLET, DELAYED RELEASE ORAL DAILY PRN
Status: DISCONTINUED | OUTPATIENT
Start: 2024-10-05 | End: 2024-10-07 | Stop reason: HOSPADM

## 2024-10-05 RX ORDER — ONDANSETRON 2 MG/ML
4 INJECTION INTRAMUSCULAR; INTRAVENOUS EVERY 6 HOURS PRN
Status: DISCONTINUED | OUTPATIENT
Start: 2024-10-05 | End: 2024-10-07 | Stop reason: HOSPADM

## 2024-10-05 RX ORDER — IBUPROFEN 600 MG/1
1 TABLET ORAL
Status: DISCONTINUED | OUTPATIENT
Start: 2024-10-05 | End: 2024-10-07 | Stop reason: HOSPADM

## 2024-10-05 RX ORDER — MAGNESIUM SULFATE 1 G/100ML
1 INJECTION INTRAVENOUS
Status: COMPLETED | OUTPATIENT
Start: 2024-10-05 | End: 2024-10-05

## 2024-10-05 RX ORDER — SODIUM CHLORIDE 0.9 % (FLUSH) 0.9 %
10 SYRINGE (ML) INJECTION AS NEEDED
Status: DISCONTINUED | OUTPATIENT
Start: 2024-10-05 | End: 2024-10-07 | Stop reason: HOSPADM

## 2024-10-05 RX ORDER — POLYETHYLENE GLYCOL 3350 17 G/17G
17 POWDER, FOR SOLUTION ORAL DAILY PRN
Status: DISCONTINUED | OUTPATIENT
Start: 2024-10-05 | End: 2024-10-07 | Stop reason: HOSPADM

## 2024-10-05 RX ORDER — NICOTINE POLACRILEX 4 MG
15 LOZENGE BUCCAL
Status: DISCONTINUED | OUTPATIENT
Start: 2024-10-05 | End: 2024-10-07 | Stop reason: HOSPADM

## 2024-10-05 RX ORDER — NOREPINEPHRINE BITARTRATE 0.03 MG/ML
.02-.3 INJECTION, SOLUTION INTRAVENOUS
Status: DISCONTINUED | OUTPATIENT
Start: 2024-10-05 | End: 2024-10-06

## 2024-10-05 RX ORDER — VANCOMYCIN 2 GRAM/500 ML IN 0.9 % SODIUM CHLORIDE INTRAVENOUS
20 ONCE
Status: COMPLETED | OUTPATIENT
Start: 2024-10-05 | End: 2024-10-05

## 2024-10-05 RX ORDER — INSULIN LISPRO 100 [IU]/ML
2-7 INJECTION, SOLUTION INTRAVENOUS; SUBCUTANEOUS
Status: DISCONTINUED | OUTPATIENT
Start: 2024-10-05 | End: 2024-10-07 | Stop reason: HOSPADM

## 2024-10-05 RX ORDER — ATORVASTATIN CALCIUM 40 MG/1
40 TABLET, FILM COATED ORAL NIGHTLY
Status: DISCONTINUED | OUTPATIENT
Start: 2024-10-05 | End: 2024-10-07 | Stop reason: HOSPADM

## 2024-10-05 RX ORDER — ARFORMOTEROL TARTRATE 15 UG/2ML
15 SOLUTION RESPIRATORY (INHALATION)
Status: DISCONTINUED | OUTPATIENT
Start: 2024-10-05 | End: 2024-10-07 | Stop reason: HOSPADM

## 2024-10-05 RX ADMIN — NOREPINEPHRINE BITARTRATE 0.03 MCG/KG/MIN: 32 SOLUTION INTRAVENOUS at 01:21

## 2024-10-05 RX ADMIN — ARFORMOTEROL TARTRATE 15 MCG: 15 SOLUTION RESPIRATORY (INHALATION) at 12:38

## 2024-10-05 RX ADMIN — MAGNESIUM SULFATE HEPTAHYDRATE 1 G: 1 INJECTION, SOLUTION INTRAVENOUS at 07:45

## 2024-10-05 RX ADMIN — SODIUM CHLORIDE, POTASSIUM CHLORIDE, SODIUM LACTATE AND CALCIUM CHLORIDE 125 ML/HR: 600; 310; 30; 20 INJECTION, SOLUTION INTRAVENOUS at 03:02

## 2024-10-05 RX ADMIN — MAGNESIUM SULFATE HEPTAHYDRATE 1 G: 1 INJECTION, SOLUTION INTRAVENOUS at 10:00

## 2024-10-05 RX ADMIN — MAGNESIUM SULFATE HEPTAHYDRATE 1 G: 1 INJECTION, SOLUTION INTRAVENOUS at 11:10

## 2024-10-05 RX ADMIN — INSULIN LISPRO 2 UNITS: 100 INJECTION, SOLUTION INTRAVENOUS; SUBCUTANEOUS at 12:03

## 2024-10-05 RX ADMIN — ATORVASTATIN CALCIUM 40 MG: 40 TABLET, FILM COATED ORAL at 21:03

## 2024-10-05 RX ADMIN — BUDESONIDE 0.5 MG: 0.5 INHALANT RESPIRATORY (INHALATION) at 12:38

## 2024-10-05 RX ADMIN — PIPERACILLIN AND TAZOBACTAM 4.5 G: 4; .5 INJECTION, POWDER, FOR SOLUTION INTRAVENOUS at 09:57

## 2024-10-05 RX ADMIN — AZITHROMYCIN 500 MG: 500 INJECTION, POWDER, LYOPHILIZED, FOR SOLUTION INTRAVENOUS at 10:46

## 2024-10-05 RX ADMIN — ACETAMINOPHEN 650 MG: 325 TABLET ORAL at 21:03

## 2024-10-05 RX ADMIN — CEFTRIAXONE SODIUM 1000 MG: 1 INJECTION, POWDER, FOR SOLUTION INTRAMUSCULAR; INTRAVENOUS at 01:03

## 2024-10-05 RX ADMIN — VANCOMYCIN HYDROCHLORIDE 2000 MG: 5 INJECTION, POWDER, LYOPHILIZED, FOR SOLUTION INTRAVENOUS at 03:03

## 2024-10-05 RX ADMIN — ASPIRIN 81 MG: 81 TABLET, COATED ORAL at 08:45

## 2024-10-05 RX ADMIN — INSULIN LISPRO 2 UNITS: 100 INJECTION, SOLUTION INTRAVENOUS; SUBCUTANEOUS at 07:53

## 2024-10-05 RX ADMIN — POTASSIUM CHLORIDE 40 MEQ: 750 CAPSULE, EXTENDED RELEASE ORAL at 07:41

## 2024-10-05 RX ADMIN — Medication 10 ML: at 08:45

## 2024-10-05 RX ADMIN — MAGNESIUM SULFATE HEPTAHYDRATE 1 G: 1 INJECTION, SOLUTION INTRAVENOUS at 08:48

## 2024-10-05 RX ADMIN — Medication 10 ML: at 03:04

## 2024-10-05 RX ADMIN — VASOPRESSIN IN 0.9% SODIUM CHLORIDE 0.03 UNITS/MIN: 20 INJECTION INTRAVENOUS at 04:54

## 2024-10-05 RX ADMIN — VASOPRESSIN IN 0.9% SODIUM CHLORIDE 0.03 UNITS/MIN: 20 INJECTION INTRAVENOUS at 15:44

## 2024-10-05 RX ADMIN — ARFORMOTEROL TARTRATE 15 MCG: 15 SOLUTION RESPIRATORY (INHALATION) at 19:35

## 2024-10-05 RX ADMIN — PIPERACILLIN AND TAZOBACTAM 4.5 G: 4; .5 INJECTION, POWDER, FOR SOLUTION INTRAVENOUS at 04:53

## 2024-10-05 RX ADMIN — ATORVASTATIN CALCIUM 40 MG: 40 TABLET, FILM COATED ORAL at 03:13

## 2024-10-05 RX ADMIN — BUDESONIDE 0.5 MG: 0.5 INHALANT RESPIRATORY (INHALATION) at 19:35

## 2024-10-05 NOTE — PLAN OF CARE
Goal Outcome Evaluation:      Levo and vaso titrated throughout day per orders. Antibiotics changed per MD. Pt has been lethargic throughout day, able to ambulate to bedside commode with standby assist. Multiple bowel movements, diarrhea throughout the day. Titrating down on o2 as pt tolerates, as ordered. CT chest pending

## 2024-10-05 NOTE — PAYOR COMM NOTE
"Yuki Gaffney (63 y.o. Male)       Date of Birth   1960    Social Security Number       Address   [address] [place] [address]    Home Phone   [phone]    MRN   [id number]       Yarsani   [facility]    Marital Status                               Admission Date   10/4/24    Admission Type   Emergency    Admitting Provider   Mirza Garcia DO    Attending Provider   Mirza Garcia DO    Department, Room/Bed   [facility], C04/1       Discharge Date       Discharge Disposition       Discharge Destination                                 Attending Provider: Mirza Garcia DO    Allergies: No Known Allergies    Isolation: None   Infection: None   Code Status: CPR    Ht: 172.7 cm (68\")   Wt: 95.4 kg (210 lb 5.1 oz)    Admission Cmt: None   Principal Problem: Septic shock [A41.9,R65.21]                   Active Insurance as of 10/4/2024       Primary Coverage       Payor Plan Insurance Group Employer/Plan Group    [employer] BLUE CROSS AN[employer]M BLUE CROSS BLUE SHIELD PPO [id number]       Payor Plan Address Payor Plan Phone Number Payor Plan Fax Number Effective Dates    [address] [phone]  4/1/2023 - None Entered    [place] [address]         Subscriber Name Subscriber Birth Date Member ID       YUKI GAFFNEY 1960 [id number]                     Emergency Contacts        (Rel.) Home Phone Work Phone Mobile Phone    ZAIRE GAFFNEY (Spouse) [phone] -- [phone]             Sepsis and Other Febrile Illness, without Focal Infection [facility] Inpatient Care       Indications Met   Last updated by Shanae Galvan, RN on 10/5/2024 1008     Review Status Created By   Primary Completed Shanae Galvan, RN      Criteria Review   Sepsis and Other Febrile Illness, without Focal Infection [facility] Inpatient Care     Overall Determination: Indications Met     Criteria:  [×] Admission is indicated for  1 or more  of the following  [B] [D]:      " [  ] Hemodynamic instability          10/5/2024 10:08 AM              -- 10/5/2024 10:08 AM by Shanae Galvan RN --                  100.5  145  22  83/51  SATS DOWN TO 87% ON VASOPRESSIN                  WBC 17.29-34.36                                    C/O FEVER,NAUSEA AND HEADACHES      [×] Bacteremia (if blood cultures performed)          10/5/2024 10:08 AM              -- 10/5/2024 10:08 AM by Shanae Galvan RN --                  ABN WITH GRAM POSITIVE COCCI IN PAIRS AND CHAINS-SEN PENDING     Notes:  -- 10/5/2024 10:08 AM by Shanae Galvan RN --                  Assessment       Septic shock      History of CAD status post PCI      Essential hypertension      Hyperlipidemia            Plan      Admit to ICU      Telemetry      Monitor vitals       CBC BMP      Respiratory panel negative      Blood cultures      Chest x-ray reviewed      Start home meds      Hold BP meds      Fluids      Levophed      IV Vanco Zosyn                                              History & Physical        Terrell Murillo MD at 10/05/24 0049           [facility]  [facility]: 10/[facility]/2024   Patient Name: Nba Gaffney  : 1960  MRN: [id number]  Primary Care Physician:  Eugene Campos MD  Date of admission: 10/4/2024    Subjective  Subjective     Chief Complaint: Fevers    HPI:    Nba Gaffney is a 63 y.o. male with past medical history of CAD, hyperlipidemia hypertension presents to the ED due to fevers.  Patient states he has been complaining of fever, nausea and headaches since earlier today.  Denies chest pain, abdominal pain, headache, dysuria, palpitations shortness of breath.  In the ED, patient's vitals showed temperature of 100.5, heart rate 107 and blood pressure 82/48.  Labs showed sodium 133, creatinine 0.97, lactic 1.6, white blood cell 17.29.  Chest x-ray shows no acute findings.  Patient was given fluids so far.  Patient admitted to floors for further  management.    Personal History     Past Medical History:  Past Medical History:   Diagnosis Date    Environmental allergies     SEASONAL ALLERGIES    Hyperlipidemia     Hypertension     Incisional hernia 2008    Lumbago     LOW BACK PAIN    Lumbar disc herniation 03/17/2014    L4-5    Lumbar spinal stenosis 03/17/2014    MI (myocardial infarction) 07/28/2021    SURGERY - 2 STENTS PLACED  7-2021- SEE'S DR Gonzales, DENIED  CP/SOB    Shoulder pain, left        Past Surgical History:  Past Surgical History:   Procedure Laterality Date    CARDIAC CATHETERIZATION N/A 07/29/2021    Procedure: Left Heart Cath;  Surgeon: Yulissa Gonzales MD;  Location:  [facility]VASIVE [facility];  Service: Cardiovascular;  Laterality: N/A;    CARDIAC CATHETERIZATION N/A 07/29/2021    Procedure: Coronary angiography;  Surgeon: Yulissa Gonzales MD;  Location:  [facility];  Service: Cardiovascular;  Laterality: N/A;    CARDIAC CATHETERIZATION N/A 07/29/2021    Procedure: Stent KAYY coronary;  Surgeon: Yulissa Gonzales MD;  Location:  [facility]VASIVE [facility];  Service: Cardiovascular;  Laterality: N/A;    CARDIAC CATHETERIZATION N/A 07/29/2021    Procedure: Left ventriculography;  Surgeon: Yulissa Gonzales MD;  Location:  [facility]VASIVE [facility];  Service: Cardiovascular;  Laterality: N/A;    CARDIAC CATHETERIZATION  07/29/2021    Procedure: Percutaneous Manual Thrombectomy - [place];  Surgeon: Yulissa Gonzales MD;  Location:  [facility] INVASIVE LOCATION;  Service: Cardiovascular;;    CARDIAC CATHETERIZATION N/A 9/6/2024    Procedure: Left Heart Cath;  Surgeon: Yulissa Gonzales MD;  Location:  [facility];  Service: Cardiovascular;  Laterality: N/A;    CARDIAC CATHETERIZATION N/A 9/6/2024    Procedure: Coronary angiography;  Surgeon: Yulissa Gonzales MD;  [facility];  Service: Cardiovascular;  Laterality: N/A;    CARDIAC  CATHETERIZATION N/A 9/6/2024    Procedure: Resting Full Cycle Ratio;  Surgeon: Yulissa Gonzales MD;  Location:  [facility] CATH INVASIVE LOCATION;  Service: Cardiovascular;  Laterality: N/A;    CORONARY ANGIOPLASTY WITH STENT PLACEMENT      ELBOW PROCEDURE Right     FEMUR FRACTURE SURGERY Left     HIP SURGERY Left     LEG SURGERY      PERIPHERAL ARTERIAL STENT GRAFT      ROTATOR CUFF REPAIR Right     SHOULDER ARTHROSCOPY W/ ROTATOR CUFF REPAIR Left 01/15/2021    Procedure: SHOULDER ARTHROSCOPY WITH ROTATOR CUFF REPAIR;  Surgeon: Noah Shaw MD;  Location:  [facility];  Service: Orthopedics;  Laterality: Left;    SPLENECTOMY      WRIST SURGERY Right     wrist orif       Family History:   Family History   Problem Relation Age of Onset    Malig Hyperthermia Neg Hx        Social History:   Social History     Socioeconomic History    Marital status:    Tobacco Use    Smoking status: Every Day     Current packs/day: 0.50     Average packs/day: 0.5 packs/day for 46.4 years (23.2 ttl pk-yrs)     Types: Cigarettes     Start date: 5/7/1978    Smokeless tobacco: Never   Vaping Use    Vaping status: Never Used   Substance and Sexual Activity    Alcohol use: Not Currently    Drug use: Not Currently    Sexual activity: Defer       Home Medications:  acetaminophen, amoxicillin-clavulanate, aspirin, atorvastatin, metFORMIN, metoprolol succinate XL, and multivitamin with minerals    Allergies:  No Known Allergies    Review of Systems   All systems were reviewed and negative except for: Above    Objective  Objective     Vitals:   Temp:  [100.5 °F (38.1 °C)] 100.5 °F (38.1 °C)  Heart Rate:  [107-145] 107  Resp:  [22] 22  BP: ()/(48-75) 82/48    Physical Exam    Constitutional: Awake, alert, no acute distress   Eyes: Pupils equal, sclerae anicteric, no conjunctival injection   HENT: NCAT, mucous membranes moist   Neck: Supple, no thyromegaly, no lymphadenopathy, trachea midline   Respiratory: Clear to auscultation  bilaterally, nonlabored respirations    Cardiovascular: RRR, no murmurs, rubs, or gallops, palpable pedal pulses bilaterally   Gastrointestinal: Positive bowel sounds, soft, nontender, nondistended   Musculoskeletal: No bilateral ankle edema, no clubbing or cyanosis to extremities   Psychiatric: Appropriate affect, cooperative   Neurologic: Oriented x 3, strength symmetric in all extremities, Cranial Nerves grossly intact to confrontation, speech clear   Skin: No rashes     Result Review   Result Review:  I have personally reviewed the results from the time of this admission to 10/5/2024 00:49 EDT and agree with these findings:  [x]  Laboratory  []  Microbiology  [x]  Radiology  []  EKG/Telemetry   []  Cardiology/Vascular   []  Pathology  []  Old records  []  Other:      Assessment & Plan  Assessment / Plan     Assessment/Plan:     Assessment   Septic shock  History of CAD status post PCI  Essential hypertension  Hyperlipidemia    Plan  Admit to ICU  Telemetry  Monitor vitals   CBC BMP  Respiratory panel negative  Blood cultures  Chest x-ray reviewed  Start home meds  Hold BP meds  Fluids  Levophed  IV Vanco Zosyn    VTE Prophylaxis:  Mechanical VTE prophylaxis orders are present.        CODE STATUS:    Code Status (Patient has no pulse and is not breathing): CPR (Attempt to Resuscitate)  Medical Interventions (Patient has pulse or is breathing): Full Support      Admission Status:  I believe this patient meets inpatient status.    Electronically signed by Terrell Murillo MD, 10/05/24, 12:49 AM EDT.             Electronically signed by Terrell Murillo MD at 10/05/24 0054          Emergency Department Notes        Quita Schneider PCT at 10/04/24 2107          Informed pt x2 of Urine needed  / provided water over 1 hour ago     Electronically signed by Quita Schneider PCT at 10/04/24 2108       Sergio Lea MD at 10/04/24 1941          Time: 7:41 PM EDT  Date of encounter:  10/4/2024  Independent  Historian/Clinical History and Information was obtained by:   Patient    History is limited by: N/A    Chief Complaint: Fever      History of Present Illness:  Patient is a 63 y.o. year old male who presents to the emergency department for evaluation of fever, generalized aching, nausea and headache for the past day.  Patient does report is scant cough.  Patient has no dysuria or urinary frequency.  Patient denies abdominal pain.  Patient has no chest pain or shortness of breath.      Patient Care Team  Primary Care Provider: Eugene Campos MD    Past Medical History:     No Known Allergies  Past Medical History:   Diagnosis Date    Environmental allergies     SEASONAL ALLERGIES    Hyperlipidemia     Hypertension     Incisional hernia 2008    Lumbago     LOW BACK PAIN    Lumbar disc herniation 03/17/2014    L4-5    Lumbar spinal stenosis 03/17/2014    MI (myocardial infarction) 07/28/2021    SURGERY - 2 STENTS PLACED  7-2021- SEE'S DR Gonzales, DENIED  CP/SOB    Shoulder pain, left      Past Surgical History:   Procedure Laterality Date    CARDIAC CATHETERIZATION N/A 07/29/2021    Procedure: Left Heart Cath;  Surgeon: Yulissa Gonzales MD;  [facility];  Service: Cardiovascular;  Laterality: N/A;    CARDIAC CATHETERIZATION N/A 07/29/2021    Procedure: Coronary angiography;  Surgeon: Yulissa Gonzales MD;  [facility];  Service: Cardiovascular;  Laterality: N/A;    CARDIAC CATHETERIZATION N/A 07/29/2021    Procedure: Stent KAYY coronary;  Surgeon: Yulissa Gonzales MD;  [facility];  Service: Cardiovascular;  Laterality: N/A;    CARDIAC CATHETERIZATION N/A 07/29/2021    Procedure: Left ventriculography;  Surgeon: Yulissa Gonzales MD;  [facility];  Service: Cardiovascular;  Laterality: N/A;    CARDIAC CATHETERIZATION  07/29/2021    Procedure: Percutaneous Manual Thrombectomy - [place];   Surgeon: Yulissa Gonzales MD;  [facility];  Service: Cardiovascular;;    CARDIAC CATHETERIZATION N/A 9/6/2024    Procedure: Left Heart Cath;  Surgeon: Yulissa Gonzales MD;  [facility];  Service: Cardiovascular;  Laterality: N/A;    CARDIAC CATHETERIZATION N/A 9/6/2024    Procedure: Coronary angiography;  Surgeon: Yulissa Gonzales MD;  [facility];  Service: Cardiovascular;  Laterality: N/A;    CARDIAC CATHETERIZATION N/A 9/6/2024    Procedure: Resting Full Cycle Ratio;  Surgeon: Yulissa Gonzales MD;  [facility];  Service: Cardiovascular;  Laterality: N/A;    CORONARY ANGIOPLASTY WITH STENT PLACEMENT      ELBOW PROCEDURE Right     FEMUR FRACTURE SURGERY Left     HIP SURGERY Left     LEG SURGERY      PERIPHERAL ARTERIAL STENT GRAFT      ROTATOR CUFF REPAIR Right     SHOULDER ARTHROSCOPY W/ ROTATOR CUFF REPAIR Left 01/15/2021    Procedure: SHOULDER ARTHROSCOPY WITH ROTATOR CUFF REPAIR;  Surgeon: Noah Shaw MD;  [facility];  Service: Orthopedics;  Laterality: Left;    SPLENECTOMY      WRIST SURGERY Right     wrist orif     Family History   Problem Relation Age of Onset    Malig Hyperthermia Neg Hx        Home Medications:  Prior to Admission medications    Medication Sig Start Date End Date Taking? Authorizing Provider   acetaminophen (TYLENOL) 650 MG 8 hr tablet Take 1 tablet by mouth Every 8 (Eight) Hours As Needed for Mild Pain.    Provider, MD Myra   amoxicillin-clavulanate (AUGMENTIN) 875-125 MG per tablet Take 1 tablet by mouth 2 (Two) Times a Day. 8/28/24   Eugene Campos MD   aspirin (Aspirin Low Dose) 81 MG EC tablet Take 1 tablet by mouth Daily. 8/14/24   Eugene Campos MD   atorvastatin (LIPITOR) 40 MG tablet Take 1 tablet by mouth Every Night. 5/24/24   Eugene Campos MD   metFORMIN (GLUCOPHAGE) 850 MG tablet Take 1 tablet by mouth 2 (Two) Times a  "Day With Meals. 10/1/24   Eugene Campos MD   metoprolol succinate XL (TOPROL-XL) 25 MG 24 hr tablet Take 1 tablet by mouth Daily. Needs appt 8/26/24   Yulissa Gonzales MD   multivitamin with minerals (MULTIVITAMIN ADULT PO) Take 1 tablet by mouth Daily.    Provider, MD Myra        Social History:   Social History     Tobacco Use    Smoking status: Every Day     Current packs/day: 0.50     Average packs/day: 0.5 packs/day for 46.4 years (23.2 ttl pk-yrs)     Types: Cigarettes     Start date: 5/7/1978    Smokeless tobacco: Never   Vaping Use    Vaping status: Never Used   Substance Use Topics    Alcohol use: Not Currently    Drug use: Not Currently         Review of Systems:  Review of Systems   Constitutional:  Positive for fever. Negative for chills.   HENT:  Negative for congestion, rhinorrhea and sore throat.    Eyes:  Negative for pain and visual disturbance.   Respiratory:  Negative for apnea, cough, chest tightness and shortness of breath.    Cardiovascular:  Negative for chest pain and palpitations.   Gastrointestinal:  Negative for abdominal pain, diarrhea, nausea and vomiting.   Genitourinary:  Negative for difficulty urinating and dysuria.   Musculoskeletal:  Negative for joint swelling and myalgias.   Skin:  Negative for color change.   Neurological:  Negative for seizures and headaches.   Psychiatric/Behavioral: Negative.     All other systems reviewed and are negative.       Physical Exam:  BP 90/59   Pulse 111   Temp 100.5 °F (38.1 °C)   Resp 22   Ht 172.7 cm (68\")   Wt 95.4 kg (210 lb 5.1 oz)   SpO2 92%   BMI 31.98 kg/m²     Physical Exam  Vitals and nursing note reviewed.   Constitutional:       General: He is not in acute distress.     Appearance: Normal appearance. He is not toxic-appearing.   HENT:      Head: Normocephalic and atraumatic.      Jaw: There is normal jaw occlusion.   Eyes:      General: Lids are normal.      Extraocular Movements: Extraocular movements intact.      " Conjunctiva/sclera: Conjunctivae normal.      Pupils: Pupils are equal, round, and reactive to light.   Cardiovascular:      Rate and Rhythm: Normal rate and regular rhythm.      Pulses: Normal pulses.      Heart sounds: Normal heart sounds.   Pulmonary:      Effort: Pulmonary effort is normal. No respiratory distress.      Breath sounds: Normal breath sounds. No wheezing or rhonchi.   Abdominal:      General: Abdomen is flat.      Palpations: Abdomen is soft.      Tenderness: There is no abdominal tenderness. There is no guarding or rebound.   Musculoskeletal:         General: Normal range of motion.      Cervical back: Normal range of motion and neck supple.      Right lower leg: No edema.      Left lower leg: No edema.   Skin:     General: Skin is warm and dry.   Neurological:      Mental Status: He is alert and oriented to person, place, and time. Mental status is at baseline.   Psychiatric:         Mood and Affect: Mood normal.                  Procedures:  Procedures      Medical Decision Making:      Comorbidities that affect care:    Hypertension    External Notes reviewed:    Previous Clinic Note: Patient was last seen in clinic for COVID-like symptoms      The following orders were placed and all results were independently analyzed by me:  Orders Placed This Encounter   Procedures    Blood Culture - Blood,    Blood Culture - Blood,    COVID-19, FLU A/B, RSV PCR 1 HR TAT - Swab, Nasopharynx    XR Chest 1 View    [place] Draw    Comprehensive Metabolic Panel    Single High Sensitivity Troponin T    Magnesium    Urinalysis With Microscopic If Indicated (No Culture) - Urine, Clean Catch    Lactic Acid, Plasma    CBC Auto Differential    Urinalysis, Microscopic Only - Urine, Clean Catch    NPO Diet NPO Type: Strict NPO    Undress & Gown    Continuous Pulse Oximetry    Vital Signs    Orthostatic Blood Pressure    Straight Cath    Oxygen Therapy- Nasal Cannula; Titrate 1-6 LPM Per SpO2; 90 - 95%    POC Glucose  Once    ECG 12 Lead ED Triage Standing Order; Weak / Dizzy / AMS    Insert Peripheral IV    Fall Precautions    CBC & Differential    Green Top (Gel)    Lavender Top    Gold Top - SST    Light Blue Top       Medications Given in the Emergency Department:  Medications   sodium chloride 0.9 % flush 10 mL (has no administration in time range)   sodium chloride 0.9 % bolus 1,000 mL (has no administration in time range)   acetaminophen (TYLENOL) tablet 650 mg (650 mg Oral Given 10/4/24 2032)   ketorolac (TORADOL) injection 15 mg (15 mg Intravenous Given 10/4/24 2032)   ondansetron (ZOFRAN) injection 4 mg (4 mg Intravenous Given 10/4/24 2032)   sodium chloride 0.9 % bolus 1,000 mL (0 mL Intravenous Stopped 10/4/24 2103)        ED Course:         Labs:    Lab Results (last 24 hours)       Procedure Component Value Units Date/Time    CBC & Differential [[id number]]  (Abnormal) Collected: 10/04/24 1846    Specimen: Blood Updated: 10/04/24 1901    Narrative:      The following orders were created for panel order CBC & Differential.  Procedure                               Abnormality         Status                     ---------                               -----------         ------                     CBC Auto Differential[[id number]]        Abnormal            Final result                 Please view results for these tests on the individual orders.    Comprehensive Metabolic Panel [[id number]]  (Abnormal) Collected: 10/04/24 1846    Specimen: Blood Updated: 10/04/24 1923     Glucose 123 mg/dL      BUN 11 mg/dL      Creatinine 0.97 mg/dL      Sodium 133 mmol/L      Potassium 3.7 mmol/L      Chloride 95 mmol/L      CO2 23.2 mmol/L      Calcium 9.5 mg/dL      Total Protein 8.0 g/dL      Albumin 4.6 g/dL      ALT (SGPT) 31 U/L      AST (SGOT) 27 U/L      Alkaline Phosphatase 68 U/L      Total Bilirubin 0.8 mg/dL      Globulin 3.4 gm/dL      A/G Ratio 1.4 g/dL      BUN/Creatinine Ratio 11.3     Anion Gap 14.8 mmol/L      eGFR  88.8 mL/min/1.73     Narrative:      GFR Normal >60  Chronic Kidney Disease <60  Kidney Failure <15      Single High Sensitivity Troponin T [[id number]]  (Normal) Collected: 10/04/24 1846    Specimen: Blood Updated: 10/04/24 1923     HS Troponin T 16 ng/L     Narrative:      High Sensitive Troponin T Reference Range:  <14.0 ng/L- Negative Female for AMI  <22.0 ng/L- Negative Male for AMI  >=14 - Abnormal Female indicating possible myocardial injury.  >=22 - Abnormal Male indicating possible myocardial injury.   Clinicians would have to utilize clinical acumen, EKG, Troponin, and serial changes to determine if it is an Acute Myocardial Infarction or myocardial injury due to an underlying chronic condition.         Magnesium [[id number]]  (Abnormal) Collected: 10/04/24 1846    Specimen: Blood Updated: 10/04/24 1923     Magnesium 1.5 mg/dL     Blood Culture - Blood, Arm, Right [[id number]] Collected: 10/04/24 1846    Specimen: Blood from Arm, Right Updated: 10/04/24 1855    Blood Culture - Blood, Arm, Left [[id number]] Collected: 10/04/24 1846    Specimen: Blood from Arm, Left Updated: 10/04/24 1855    Lactic Acid, Plasma [[id number]]  (Normal) Collected: 10/04/24 1846    Specimen: Blood Updated: 10/04/24 1918     Lactate 1.6 mmol/L     CBC Auto Differential [[id number]]  (Abnormal) Collected: 10/04/24 1846    Specimen: Blood Updated: 10/04/24 1901     WBC 17.29 10*3/mm3      RBC 5.08 10*6/mm3      Hemoglobin 15.7 g/dL      Hematocrit 45.7 %      MCV 90.0 fL      MCH 30.9 pg      MCHC 34.4 g/dL      RDW 13.5 %      RDW-SD 45.2 fl      MPV 9.1 fL      Platelets 345 10*3/mm3      Neutrophil % 94.1 %      Lymphocyte % 4.5 %      Monocyte % 0.3 %      Eosinophil % 0.1 %      Basophil % 0.5 %      Immature Grans % 0.5 %      Neutrophils, Absolute 16.28 10*3/mm3      Lymphocytes, Absolute 0.77 10*3/mm3      Monocytes, Absolute 0.06 10*3/mm3      Eosinophils, Absolute 0.01 10*3/mm3      Basophils, Absolute 0.08 10*3/mm3       Immature Grans, Absolute 0.09 10*3/mm3      nRBC 0.0 /100 WBC     COVID-19, FLU A/B, RSV PCR 1 HR TAT - Swab, Nasopharynx [[id number]]  (Normal) Collected: 10/04/24 1859    Specimen: Swab from Nasopharynx Updated: 10/04/24 1942     COVID19 Not Detected     Influenza A PCR Not Detected     Influenza B PCR Not Detected     RSV, PCR Not Detected    Narrative:      Fact sheet for providers: https://www.fda.gov/media/309512/download    Fact sheet for patients: https://www.fda.gov/media/836397/download    Test performed by PCR.    Urinalysis With Microscopic If Indicated (No Culture) - Urine, Clean Catch [[id number]]  (Abnormal) Collected: 10/04/24 2134    Specimen: Urine, Clean Catch Updated: 10/04/24 2147     Color, UA Yellow     Appearance, UA Clear     pH, UA 6.0     Specific Gravity, UA 1.018     Glucose, UA Negative     Ketones, UA Trace     Bilirubin, UA Negative     Blood, UA Negative     Protein, UA 30 mg/dL (1+)     Leuk Esterase, UA Negative     Nitrite, UA Negative     Urobilinogen, UA 0.2 E.U./dL    Urinalysis, Microscopic Only - Urine, Clean Catch [[id number]] Collected: 10/04/24 2134    Specimen: Urine, Clean Catch Updated: 10/04/24 2147     RBC, UA 0-2 /HPF      WBC, UA 0-2 /HPF      Bacteria, UA None Seen /HPF      Squamous Epithelial Cells, UA 0-2 /HPF      Hyaline Casts, UA 0-2 /LPF      Methodology Automated Microscopy             Imaging:    XR Chest 1 View    Result Date: 10/4/2024  XR CHEST 1 VW Date of Exam: 10/4/2024 6:34 PM EDT Indication: Weak/Dizzy/AMS triage protocol Comparison: Chest CT 9/25/2023 chest radiograph 7/29/2021 Findings: Mediastinum: Cardiomediastinal silhouette appears similar to prior accounting for differences in technique Lungs: The lungs appear clear. Pleura: No pleural effusion or pneumothorax. Bones and soft tissues: No acute osseous abnormality.     Impression: No acute intrathoracic finding. Electronically Signed: Denny Gifford  10/4/2024 6:58 PM EDT  Workstation ID:  [id number]       Differential Diagnosis and Discussion:    Fever: Based on the complaint of fever, differential diagnosis includes but is not limited to meningitis, pneumonia, pyelonephritis, acute uti,  systemic immune response syndrome, sepsis, viral syndrome, fungal infection, tick born illness and other bacterial infections.    All labs were reviewed and interpreted by me.  All X-rays impressions were independently interpreted by me.    MDM     The patient´s CBC that was reviewed and interpreted by me shows no abnormalities of critical concern. Of note, there is no anemia requiring a blood transfusion and the platelet count is acceptable.  The patient´s CMP that was reviewed and interpretted by me shows no abnormalities of critical concern. Of note, the patient´s sodium and potassium are acceptable. The patient´s liver enzymes are unremarkable. The patient´s renal function (creatinine) is preserved. The patient has a normal anion gap.  The patient is resting comfortably and feels better, is alert and in no distress. Influenza swab is negative.  COVID-19 swab is negative..  Patient's blood pressure remained low and he was given 2 L normal saline.    Total Critical Care time of 40 minutes. Total critical care time documented does not include time spent on separately billed procedures for services of nurses or physician assistants. I personally saw and examined the patient. I have reviewed all diagnostic interpretations and treatment plans as written. I was present for the key portions of any procedures performed and the inclusive time noted in any critical care statement. Critical care time includes patient management by me, time spent at the patients bedside,  time to review lab and imaging results, discussing patient care, documentation in the medical record, and time spent with family or caregiver.                Patient Care Considerations:    None    Consultants/Shared Management Plan:    Case was discussed  with Dr. Sanchez who agrees to admit the patient.    Social Determinants of Health:    Patient is independent, reliable, and has access to care.       Disposition and Care Coordination:    Admitted       Final diagnoses:   Fever and chills        ED Disposition       ED Disposition   Discharge    Condition   Stable    Comment   --               This medical record created using voice recognition software.             Sergio Lea MD  10/04/24 2258       Sergio Lea MD  10/05/24 0147      Electronically signed by Sergio Lea MD at 10/05/24 0147       Physician Progress Notes (last 24 hours)  Notes from 10/04/24 1040 through 10/05/24 1040   No notes of this type exist for this encounter.       Consult Notes (last 24 hours)  Notes from 10/04/24 1040 through 10/05/24 1040   No notes of this type exist for this encounter.           [facility] ,[facility] [phone]  [phone]

## 2024-10-05 NOTE — PROGRESS NOTES
Respiratory Therapist Broncho-Pulmonary Hygiene Progress Note      Patient Name:  Nba Gaffney  YOB: 1960    Nba Gaffney meets the qualification for Level 2 of the Bronco-Pulmonary Hygiene Protocol. This was based on my daily patient assessment and includes review of chest x-ray results, cough ability and quality, oxygenation, secretions or risk for secretion development and patient mobility.     Broncho-Pulmonary Hygiene Assessment:    Level of Movement: Actively changing positions without assistance  Alert/ oriented/ cooperative    Breath Sounds: Diminished and/or coarse rhonchi    Cough: Strong, effective and/or frequent    Chest X-Ray: Possible signs of consolidation and/or atelectasis or clear.     Sputum Productions: None or small amount of thin or watery secretions with effective cough    History and Physical: None    SpO2 to Oxygen Need: greater than 92% on room air or  less than 3L nasal canula    Current SpO2 is: 99 on 2L    Based on this information, I have completed the following interventions: Aerobika with bronchodialtor medication or TID      Electronically signed by Shanda Gardiner RRT, 10/05/24, 12:43 PM EDT.

## 2024-10-05 NOTE — H&P
[facility] HISTORY AND PHYSICAL  Date: 10/5/2024   Patient Name: Nba Gaffney  : 1960  MRN: [id number]  Primary Care Physician:  Eugene Campos MD  Date of admission: 10/4/2024    Subjective   Subjective     Chief Complaint: Fevers    HPI:    Nba Gaffney is a 63 y.o. male with past medical history of CAD, hyperlipidemia hypertension presents to the ED due to fevers.  Patient states he has been complaining of fever, nausea and headaches since earlier today.  Denies chest pain, abdominal pain, headache, dysuria, palpitations shortness of breath.  In the ED, patient's vitals showed temperature of 100.5, heart rate 107 and blood pressure 82/48.  Labs showed sodium 133, creatinine 0.97, lactic 1.6, white blood cell 17.29.  Chest x-ray shows no acute findings.  Patient was given fluids so far.  Patient admitted to floors for further management.    Personal History     Past Medical History:  Past Medical History:   Diagnosis Date    Environmental allergies     SEASONAL ALLERGIES    Hyperlipidemia     Hypertension     Incisional hernia     Lumbago     LOW BACK PAIN    Lumbar disc herniation 2014    L4-5    Lumbar spinal stenosis 2014    MI (myocardial infarction) 2021    SURGERY - 2 STENTS PLACED  - SEE'S DR Gonzales, DENIED  CP/SOB    Shoulder pain, left        Past Surgical History:  Past Surgical History:   Procedure Laterality Date    CARDIAC CATHETERIZATION N/A 2021    Procedure: Left Heart Cath;  Surgeon: Yulissa Gonzales MD;  Location: [facility] CATH INVASIVE LOCATION;  Service: Cardiovascular;  Laterality: N/A;    CARDIAC CATHETERIZATION N/A 2021    Procedure: Coronary angiography;  Surgeon: Yulissa Gonzales MD;  Location: [facility] CATH INVASIVE LOCATION;  Service: Cardiovascular;  Laterality: N/A;    CARDIAC CATHETERIZATION N/A 2021    Procedure: Stent KAYY coronary;  Surgeon: Yulissa Gonzales MD;  Location: [facility]  CATH INVASIVE LOCATION;  Service: Cardiovascular;  Laterality: N/A;    CARDIAC CATHETERIZATION N/A 07/29/2021    Procedure: Left ventriculography;  Surgeon: Yulissa Gonzales MD;  Location: [facility] CATH INVASIVE LOCATION;  Service: Cardiovascular;  Laterality: N/A;    CARDIAC CATHETERIZATION  07/29/2021    Procedure: Percutaneous Manual Thrombectomy - [place];  Surgeon: Yulissa Gonzales MD;  Location: [facility] CATH INVASIVE LOCATION;  Service: Cardiovascular;;    CARDIAC CATHETERIZATION N/A 9/6/2024    Procedure: Left Heart Cath;  Surgeon: Yulissa Gonzales MD;  Location: [facility] CATH INVASIVE LOCATION;  Service: Cardiovascular;  Laterality: N/A;    CARDIAC CATHETERIZATION N/A 9/6/2024    Procedure: Coronary angiography;  Surgeon: Yulissa Gonzales MD;  Location: [facility] CATH INVASIVE LOCATION;  Service: Cardiovascular;  Laterality: N/A;    CARDIAC CATHETERIZATION N/A 9/6/2024    Procedure: Resting Full Cycle Ratio;  Surgeon: Yulissa Gonzales MD;  Location: [facility] CATH INVASIVE LOCATION;  Service: Cardiovascular;  Laterality: N/A;    CORONARY ANGIOPLASTY WITH STENT PLACEMENT      ELBOW PROCEDURE Right     FEMUR FRACTURE SURGERY Left     HIP SURGERY Left     LEG SURGERY      PERIPHERAL ARTERIAL STENT GRAFT      ROTATOR CUFF REPAIR Right     SHOULDER ARTHROSCOPY W/ ROTATOR CUFF REPAIR Left 01/15/2021    Procedure: SHOULDER ARTHROSCOPY WITH ROTATOR CUFF REPAIR;  Surgeon: Noah Shaw MD;  [place]: [facility] [place] [facility] [place];  Service: Orthopedics;  Laterality: Left;    SPLENECTOMY      WRIST SURGERY Right     wrist orif       Family History:   Family History   Problem Relation Age of Onset    Malig Hyperthermia Neg Hx        Social History:   Social History     Socioeconomic History    Marital status:    Tobacco Use    Smoking status: Every Day     Current packs/day: 0.50     Average packs/day: 0.5 packs/day for 46.4 years (23.2 ttl pk-yrs)     Types: Cigarettes     Start date: 5/7/1978    Smokeless  tobacco: Never   Vaping Use    Vaping status: Never Used   Substance and Sexual Activity    Alcohol use: Not Currently    Drug use: Not Currently    Sexual activity: Defer       Home Medications:  acetaminophen, amoxicillin-clavulanate, aspirin, atorvastatin, metFORMIN, metoprolol succinate XL, and multivitamin with minerals    Allergies:  No Known Allergies    Review of Systems   All systems were reviewed and negative except for: Above    Objective   Objective     Vitals:   Temp:  [100.5 °F (38.1 °C)] 100.5 °F (38.1 °C)  Heart Rate:  [107-145] 107  Resp:  [22] 22  BP: ()/(48-75) 82/48    Physical Exam    Constitutional: Awake, alert, no acute distress   Eyes: Pupils equal, sclerae anicteric, no conjunctival injection   HENT: NCAT, mucous membranes moist   Neck: Supple, no thyromegaly, no lymphadenopathy, trachea midline   Respiratory: Clear to auscultation bilaterally, nonlabored respirations    Cardiovascular: RRR, no murmurs, rubs, or gallops, palpable pedal pulses bilaterally   Gastrointestinal: Positive bowel sounds, soft, nontender, nondistended   Musculoskeletal: No bilateral ankle edema, no clubbing or cyanosis to extremities   Psychiatric: Appropriate affect, cooperative   Neurologic: Oriented x 3, strength symmetric in all extremities, Cranial Nerves grossly intact to confrontation, speech clear   Skin: No rashes     Result Review    Result Review:  I have personally reviewed the results from the time of this admission to 10/5/2024 00:49 EDT and agree with these findings:  [x]  Laboratory  []  Microbiology  [x]  Radiology  []  EKG/Telemetry   []  Cardiology/Vascular   []  Pathology  []  Old records  []  Other:      Assessment & Plan   Assessment / Plan     Assessment/Plan:     Assessment   Septic shock  History of CAD status post PCI  Essential hypertension  Hyperlipidemia    Plan  Admit to ICU  Telemetry  Monitor vitals   CBC BMP  Respiratory panel negative  Blood cultures  Chest x-ray  reviewed  Start home meds  Hold BP meds  Fluids  Levophed  IV Vanco Zosyn    VTE Prophylaxis:  Mechanical VTE prophylaxis orders are present.        CODE STATUS:    Code Status (Patient has no pulse and is not breathing): CPR (Attempt to Resuscitate)  Medical Interventions (Patient has pulse or is breathing): Full Support      Admission Status:  I believe this patient meets inpatient status.    Electronically signed by Terrell Murillo MD, 10/05/24, 12:49 AM EDT.

## 2024-10-05 NOTE — PLAN OF CARE
Goal Outcome Evaluation:  Plan of Care Reviewed With: patient        Progress: no change       Pt admitted today for septic shock , fever , cough , and body aches. Pt states that symptoms started on his way home from work , having chills. CXR negative , blood cultures positive  for Strep Pna , pt was bolused with 2 liters in ED , started on levophed in ED , arrived to CCU with levaphed at 0.03 mcg , no antibodic had been started, Mrsa screened , shortly after arrival levaphed up to 0.15mcg , DR Murillo informed , new orders for labs, EKG for st elevation and st depression , trop. Blood culture positive for Gram Cocci pairs and chains. Pt given Vancomycin, and zosyn IV , pt appears ill, denies pain.

## 2024-10-05 NOTE — CONSULTS
Pulmonary / Critical Care Consult Note      Patient Name: Nba Gaffney  : 1960  MRN: [id number]  Primary Care Physician:  Eugene Campos MD  Referring Physician: Mirza Garcia DO  Date of admission: 10/4/2024    Subjective   Subjective     Reason for Consult/ Chief Complaint:   Acute hypoxic respiratory failure requiring supplemental oxygen, streptococcal pneumonia, strep bacteremia    HPI:  Nba Gaffney is a 63 y.o. male with past medical history of CAD, hyperlipidemia, hypertension presented to the ED for evaluation of shortness of breath and fevers requiring supplemental oxygen.  In the ED, labs were unremarkable except for leukocytosis and a Tmax of 100.5 and labile blood pressures.  Blood cultures positive for gram-positive cocci in pairs and chains.  Patient was started on vancomycin and Zosyn.  The service was consulted to assist in management treatment of the patient's acute issues.  Upon assessment, patient lying in bed on 4 L nasal cannula in no apparent distress.  Findings and plan were discussed with the patient.  Patient stating that he been feeling unwell for the last couple days, reports being on blood pressure medicine for hypertension, and needs to leave the hospital soon because he has family coming into town.  Patient denies any chest pain or chest tightness, cough or hemoptysis, or vomiting.      Personal History     Past Medical History:   Diagnosis Date    Arthritis     Coronary artery disease     Diabetes mellitus     Elevated cholesterol     Environmental allergies     SEASONAL ALLERGIES    History of transfusion     Hyperlipidemia     Hypertension     Incisional hernia     Lumbago     LOW BACK PAIN    Lumbar disc herniation 2014    L4-5    Lumbar spinal stenosis 2014    MI (myocardial infarction) 2021    SURGERY - 2 STENTS PLACED  - SEE'S DR Gonzales, DENIED  CP/SOB    Shoulder pain, left        Past Surgical History:   Procedure Laterality  Date    CARDIAC CATHETERIZATION N/A 07/29/2021    Procedure: Left Heart Cath;  Surgeon: Yulissa Gonzales MD;  Location:  [facility] CATH INVASIVE LOCATION;  Service: Cardiovascular;  Laterality: N/A;    CARDIAC CATHETERIZATION N/A 07/29/2021    Procedure: Coronary angiography;  Surgeon: Yulissa Gonzales MD;  Location:  [facility] CATH INVASIVE LOCATION;  Service: Cardiovascular;  Laterality: N/A;    CARDIAC CATHETERIZATION N/A 07/29/2021    Procedure: Stent KAYY coronary;  Surgeon: Yulissa Gonzales MD;  Location:  [facility] CATH INVASIVE LOCATION;  Service: Cardiovascular;  Laterality: N/A;    CARDIAC CATHETERIZATION N/A 07/29/2021    Procedure: Left ventriculography;  Surgeon: Yulissa Gonzales MD;  Location:  [facility] CATH INVASIVE LOCATION;  Service: Cardiovascular;  Laterality: N/A;    CARDIAC CATHETERIZATION  07/29/2021    Procedure: Percutaneous Manual Thrombectomy - [place];  Surgeon: Yulissa Gonzales MD;  [facility];  Service: Cardiovascular;;    CARDIAC CATHETERIZATION N/A 9/6/2024    Procedure: Left Heart Cath;  Surgeon: Yulissa Gonzales MD;  [facility];  Service: Cardiovascular;  Laterality: N/A;    CARDIAC CATHETERIZATION N/A 9/6/2024    Procedure: Coronary angiography;  Surgeon: Yulissa Gonzales MD;  [facility];  Service: Cardiovascular;  Laterality: N/A;    CARDIAC CATHETERIZATION N/A 9/6/2024    Procedure: Resting Full Cycle Ratio;  Surgeon: Yulissa Gonzales MD;  [facility];  Service: Cardiovascular;  Laterality: N/A;    CORONARY ANGIOPLASTY WITH STENT PLACEMENT      ELBOW PROCEDURE Right     FEMUR FRACTURE SURGERY Left     HIP SURGERY Left     LEG SURGERY      PERIPHERAL ARTERIAL STENT GRAFT      ROTATOR CUFF REPAIR Right     SHOULDER ARTHROSCOPY W/ ROTATOR CUFF REPAIR Left 01/15/2021    Procedure: SHOULDER ARTHROSCOPY WITH ROTATOR CUFF REPAIR;  Surgeon:  Noah Shaw MD;  Location: [facility] OR [facility] [place];  Service: Orthopedics;  Laterality: Left;    SPLENECTOMY      WRIST SURGERY Right     wrist orif       Family History: family history is not on file. Otherwise pertinent FHx was reviewed and not pertinent to current issue.    Social History:  reports that he has been smoking cigarettes. He started smoking about 46 years ago. He has a 23.2 pack-year smoking history. He has never used smokeless tobacco. He reports that he does not currently use alcohol. He reports that he does not currently use drugs.    Home Medications:  acetaminophen, amoxicillin-clavulanate, aspirin, atorvastatin, metFORMIN, metoprolol succinate XL, and multivitamin with minerals    Allergies:  No Known Allergies    Objective    Objective     Vitals:   Temp:  [98.4 °F (36.9 °C)-100.5 °F (38.1 °C)] 98.5 °F (36.9 °C)  Heart Rate:  [] 83  Resp:  [22] 22  BP: ()/(33-75) 100/42  Flow (L/min):  [4] 4    Physical Exam:  Vital Signs Reviewed   General:  WDWN, Alert, NAD.  Chronically ill-appearing male, lying in bed  HEENT:  PERRL, EOMI.  OP, nares clear, no sinus tenderness  Neck:  Supple, no JVD, no thyromegaly  Lymph: no axillary, cervical, supraclavicular lymphadenopathy noted bilaterally  Chest:  good aeration, bilateral rhonchi, no wheezing or crackles, no increased work of breathing on 4 L nasal cannula  CV: RRR, no MGR, pulses 2+, equal.  Abd:  Soft, NT, ND, + BS, no HSM  EXT:  no clubbing, no cyanosis, no edema, no joint tenderness  Neuro:  A&Ox3, CN grossly intact, no focal deficits.  Skin: No rashes or lesions noted      Result Review    Result Review:  I have personally reviewed the results from the time of this admission to 10/5/2024 07:23 EDT and agree with these findings:  [x]  Laboratory  [x]  Microbiology  [x]  Radiology  []  EKG/Telemetry   []  Cardiology/Vascular   []  Pathology  []  Old records  []  Other:  Most notable findings include:         Lab 10/05/24  0423 10/04/24  2006    WBC 34.36* 17.29*   HEMOGLOBIN 13.8 15.7   HEMATOCRIT 41.0 45.7   PLATELETS 270 345   SODIUM 134* 133*   POTASSIUM 3.8 3.7   CHLORIDE 100 95*   CO2 18.6* 23.2   BUN 16 11   CREATININE 1.13 0.97   GLUCOSE 142* 123*   CALCIUM 8.1* 9.5   PHOSPHORUS 3.5  --    TOTAL PROTEIN  --  8.0   ALBUMIN  --  4.6   GLOBULIN  --  3.4       Assessment & Plan   Assessment / Plan     Active Hospital Problems:  Active Hospital Problems    Diagnosis     **Septic shock      Impression:  Septic shock, present on admission  Acute hypoxic respiratory failure requiring supplemental oxygen, no home O2 at baseline  Pneumococcal pneumonia  Pneumococcal bacteremia   Hypomagnesemia  Hypokalemia  Hyponatremia, clinically significant  Leukocytosis  Elevated procalcitonin 25.69     Plan:  -Continue to wean supinoxin to maintain SpO2 greater than 90%  -Will discontinue vancomycin and Zosyn and transition patient to ceftriaxone and azithromycin for pneumococcal pneumonia.  Will need 7 days of antibiotics  -10/4 CXR demonstrating no acute intrathoracic findings.  Will check chest CT with noncontrast  -Okay to use Levophed for hypotension to maintain a MAP of 65.  Patient has been recently weaned off of vasopressin  -Discontinue IV fluids  -Wrote for Brovana, Pulmicort, and DuoNebs  -Initiated bronchopulmonary hygiene.  Encourage flutter valve and I-S use  -Continue to monitor renal panel and electrolytes.  Replace potassium and magnesium intravenously     VTE Prophylaxis:  Mechanical VTE prophylaxis orders are present.    Code Status and Medical Interventions: CPR (Attempt to Resuscitate); Full Support   Ordered at: 10/05/24 0047     Code Status (Patient has no pulse and is not breathing):    CPR (Attempt to Resuscitate)     Medical Interventions (Patient has pulse or is breathing):    Full Support        Patient is critically ill with septic shock, pneumococcal pneumonia with bacteremia, leukocytosis, multiple electrolyte disturbances. We have  personally reviewed all pertinent labs, imaging, microbiology and documentation. We have discussed care with the primary service as well as at multidisciplinary critical care rounds with the bedside nurse, respiratory therapist, pharmacist and all other ancillary services.35 minutes of critical care time was spent managing this patient, excluding procedures. Of this time, I spent 25 minutes in accordance with split shared billing.    I, QUAN Zazueta, spent 10 minutes critical care time in accordance to split shared billing.  Electronically signed by QUAN Moss, 10/05/24, 12:40 PM EDT.    Electronically signed by Eder Pierson MD, 10/05/24, 2:40 PM EDT.

## 2024-10-05 NOTE — THERAPY EVALUATION
Respiratory Therapist Broncho-Pulmonary Hygiene Progress Note      Patient Name:  Nba Gaffney  YOB: 1960    Nba Gaffney meets the qualification for Level 1 of the Bronco-Pulmonary Hygiene Protocol. This was based on my daily patient assessment and includes review of chest x-ray results, cough ability and quality, oxygenation, secretions or risk for secretion development and patient mobility.     Broncho-Pulmonary Hygiene Assessment:    Level of Movement: Actively changing positions without assistance  Alert/ oriented/ cooperative    Breath Sounds: Clear to slightly diminished    Cough: Strong, effective    Chest X-Ray: Possible signs of consolidation and/or atelectasis or clear.     Sputum Productions: None or small amount of thin or watery secretions with effective cough    History and Physical: None    SpO2 to Oxygen Need: greater than 92% on 4-6L nasal canula    Current SpO2 is: 4 on 96      Based on this information, I have completed the following interventions: Teach/Instruct patient on cough and deep breathe      Electronically signed by Sanchez Maher RRT, 10/05/24, 7:51 AM EDT.

## 2024-10-06 PROBLEM — A41.9 SEPTIC SHOCK: Status: RESOLVED | Noted: 2024-10-05 | Resolved: 2024-10-06

## 2024-10-06 PROBLEM — B95.3 BACTEREMIA DUE TO STREPTOCOCCUS PNEUMONIAE: Status: ACTIVE | Noted: 2024-10-06

## 2024-10-06 PROBLEM — R78.81 BACTEREMIA DUE TO STREPTOCOCCUS PNEUMONIAE: Status: ACTIVE | Noted: 2024-10-06

## 2024-10-06 PROBLEM — R65.21 SEPTIC SHOCK: Status: RESOLVED | Noted: 2024-10-05 | Resolved: 2024-10-06

## 2024-10-06 LAB
ANION GAP SERPL CALCULATED.3IONS-SCNC: 10.6 MMOL/L (ref 5–15)
BUN SERPL-MCNC: 15 MG/DL (ref 8–23)
BUN/CREAT SERPL: 20.8 (ref 7–25)
CALCIUM SPEC-SCNC: 8.2 MG/DL (ref 8.6–10.5)
CHLORIDE SERPL-SCNC: 101 MMOL/L (ref 98–107)
CO2 SERPL-SCNC: 19.4 MMOL/L (ref 22–29)
CREAT SERPL-MCNC: 0.72 MG/DL (ref 0.76–1.27)
EGFRCR SERPLBLD CKD-EPI 2021: 103.9 ML/MIN/1.73
GLUCOSE BLDC GLUCOMTR-MCNC: 111 MG/DL (ref 70–99)
GLUCOSE BLDC GLUCOMTR-MCNC: 124 MG/DL (ref 70–99)
GLUCOSE BLDC GLUCOMTR-MCNC: 80 MG/DL (ref 70–99)
GLUCOSE BLDC GLUCOMTR-MCNC: 94 MG/DL (ref 70–99)
GLUCOSE SERPL-MCNC: 131 MG/DL (ref 65–99)
MAGNESIUM SERPL-MCNC: 2.2 MG/DL (ref 1.6–2.4)
PHOSPHATE SERPL-MCNC: 2 MG/DL (ref 2.5–4.5)
POTASSIUM SERPL-SCNC: 4.3 MMOL/L (ref 3.5–5.2)
QT INTERVAL: 410 MS
QTC INTERVAL: 447 MS
SODIUM SERPL-SCNC: 131 MMOL/L (ref 136–145)

## 2024-10-06 PROCEDURE — 99291 CRITICAL CARE FIRST HOUR: CPT | Performed by: INTERNAL MEDICINE

## 2024-10-06 PROCEDURE — 25010000002 CEFTRIAXONE PER 250 MG: Performed by: INTERNAL MEDICINE

## 2024-10-06 PROCEDURE — 93005 ELECTROCARDIOGRAM TRACING: CPT | Performed by: STUDENT IN AN ORGANIZED HEALTH CARE EDUCATION/TRAINING PROGRAM

## 2024-10-06 PROCEDURE — 84100 ASSAY OF PHOSPHORUS: CPT

## 2024-10-06 PROCEDURE — 82948 REAGENT STRIP/BLOOD GLUCOSE: CPT | Performed by: STUDENT IN AN ORGANIZED HEALTH CARE EDUCATION/TRAINING PROGRAM

## 2024-10-06 PROCEDURE — 82948 REAGENT STRIP/BLOOD GLUCOSE: CPT

## 2024-10-06 PROCEDURE — 94799 UNLISTED PULMONARY SVC/PX: CPT

## 2024-10-06 PROCEDURE — 80048 BASIC METABOLIC PNL TOTAL CA: CPT | Performed by: STUDENT IN AN ORGANIZED HEALTH CARE EDUCATION/TRAINING PROGRAM

## 2024-10-06 PROCEDURE — 87040 BLOOD CULTURE FOR BACTERIA: CPT | Performed by: INTERNAL MEDICINE

## 2024-10-06 PROCEDURE — 25010000002 CALCIUM GLUCONATE-NACL 1-0.675 GM/50ML-% SOLUTION: Performed by: INTERNAL MEDICINE

## 2024-10-06 PROCEDURE — 25010000002 ENOXAPARIN PER 10 MG: Performed by: INTERNAL MEDICINE

## 2024-10-06 PROCEDURE — 94664 DEMO&/EVAL PT USE INHALER: CPT

## 2024-10-06 PROCEDURE — 0 VASOPRESSIN 0.2 UNITS/ML SOLUTION: Performed by: STUDENT IN AN ORGANIZED HEALTH CARE EDUCATION/TRAINING PROGRAM

## 2024-10-06 PROCEDURE — 83735 ASSAY OF MAGNESIUM: CPT

## 2024-10-06 PROCEDURE — 99233 SBSQ HOSP IP/OBS HIGH 50: CPT | Performed by: INTERNAL MEDICINE

## 2024-10-06 RX ORDER — AZITHROMYCIN 250 MG/1
500 TABLET, FILM COATED ORAL EVERY 24 HOURS
Status: DISCONTINUED | OUTPATIENT
Start: 2024-10-06 | End: 2024-10-06

## 2024-10-06 RX ORDER — NICOTINE 21 MG/24HR
1 PATCH, TRANSDERMAL 24 HOURS TRANSDERMAL
Status: DISCONTINUED | OUTPATIENT
Start: 2024-10-06 | End: 2024-10-07 | Stop reason: HOSPADM

## 2024-10-06 RX ORDER — ENOXAPARIN SODIUM 100 MG/ML
40 INJECTION SUBCUTANEOUS EVERY 24 HOURS
Status: DISCONTINUED | OUTPATIENT
Start: 2024-10-06 | End: 2024-10-07 | Stop reason: HOSPADM

## 2024-10-06 RX ORDER — CALCIUM GLUCONATE 20 MG/ML
1000 INJECTION, SOLUTION INTRAVENOUS ONCE
Status: COMPLETED | OUTPATIENT
Start: 2024-10-06 | End: 2024-10-06

## 2024-10-06 RX ADMIN — CALCIUM GLUCONATE 1000 MG: 20 INJECTION, SOLUTION INTRAVENOUS at 12:06

## 2024-10-06 RX ADMIN — BUDESONIDE 0.5 MG: 0.5 INHALANT RESPIRATORY (INHALATION) at 21:23

## 2024-10-06 RX ADMIN — Medication 10 ML: at 21:58

## 2024-10-06 RX ADMIN — BUDESONIDE 0.5 MG: 0.5 INHALANT RESPIRATORY (INHALATION) at 07:22

## 2024-10-06 RX ADMIN — Medication 10 ML: at 09:17

## 2024-10-06 RX ADMIN — ARFORMOTEROL TARTRATE 15 MCG: 15 SOLUTION RESPIRATORY (INHALATION) at 21:23

## 2024-10-06 RX ADMIN — ACETAMINOPHEN 650 MG: 325 TABLET ORAL at 04:35

## 2024-10-06 RX ADMIN — NICOTINE 1 PATCH: 21 PATCH, EXTENDED RELEASE TRANSDERMAL at 13:58

## 2024-10-06 RX ADMIN — ACETAMINOPHEN 650 MG: 325 TABLET ORAL at 09:16

## 2024-10-06 RX ADMIN — CEFTRIAXONE SODIUM 2000 MG: 2 INJECTION, POWDER, FOR SOLUTION INTRAMUSCULAR; INTRAVENOUS at 00:23

## 2024-10-06 RX ADMIN — ATORVASTATIN CALCIUM 40 MG: 40 TABLET, FILM COATED ORAL at 21:58

## 2024-10-06 RX ADMIN — ENOXAPARIN SODIUM 40 MG: 100 INJECTION SUBCUTANEOUS at 13:58

## 2024-10-06 RX ADMIN — ARFORMOTEROL TARTRATE 15 MCG: 15 SOLUTION RESPIRATORY (INHALATION) at 07:22

## 2024-10-06 RX ADMIN — VASOPRESSIN IN 0.9% SODIUM CHLORIDE 0.03 UNITS/MIN: 20 INJECTION INTRAVENOUS at 01:30

## 2024-10-06 RX ADMIN — ASPIRIN 81 MG: 81 TABLET, COATED ORAL at 09:16

## 2024-10-06 RX ADMIN — ACETAMINOPHEN 650 MG: 325 TABLET ORAL at 15:46

## 2024-10-06 RX ADMIN — ACETAMINOPHEN 650 MG: 325 TABLET ORAL at 21:58

## 2024-10-06 NOTE — PLAN OF CARE
Goal Outcome Evaluation:           Progress: improving  Outcome Evaluation: Pleasant patient transferred from CCU this afternoon to 4NT. Up ad [place]. Two sets of eyes skin assessment performed with RICKY Snyder. No notable skin issues. Patient complained of chronic back pain; medicated with tylenol per patient request. Family at bedside. Patient frequently stating he wants and needs a cigarette. Nicotine patch obtained and applied per request and per MAR. Patient hopes to DC home tomorrow. No concerns at this time, continue plan of care.

## 2024-10-06 NOTE — PROGRESS NOTES
Pulmonary / Critical Care Progress Note      Patient Name: Nba Gaffney  : 1960  MRN: [id number]  Attending:  Mirza Garcia DO   Date of admission: 10/4/2024    Subjective   Subjective   Patient critically ill with septic shock    Over past 24 hours:  Weaned off pressors this morning  CT was negative for pneumonia  Does report having a dental abscess a few weeks ago and reports poor dentition and dental caries, question source of infection  Was having some mouth pain recently  Multiple electrolyte disturbances  Remains on antibiotics  No fevers  Urine output improved      Objective   Objective     Vitals:   Vital signs for last 24 hours:  Temp:  [97.5 °F (36.4 °C)-97.9 °F (36.6 °C)] 97.5 °F (36.4 °C)  Heart Rate:  [50-84] 70  Resp:  [12-20] 14  BP: ()/(36-94) 110/62    Intake/Output last 3 shifts:  I/O last 3 completed shifts:  In: .6 [I.V.:43.6; IV Piggyback:]  Out: -   Intake/Output this shift:  I/O this shift:  In: -   Out: 550 [Urine:550]    Vent settings for last 24 hours:       Hemodynamic parameters for last 24 hours:       Physical Exam   Vital Signs Reviewed   General:  WDWN, Alert, NAD.    HEENT:  PERRL, EOMI.  OP with poor dentition, nares clear  Chest:  good aeration, clear to auscultation bilaterally, tympanic to percussion bilaterally, no work of breathing noted  CV: RRR, no MGR, pulses 2+, equal.  Abd:  Soft, NT, ND, + BS, no HSM  EXT:  no clubbing, no cyanosis, no edema  Neuro:  A&Ox3, CN grossly intact, no focal deficits.  Skin: No rashes or lesions noted        Result Review    Result Review:  I have personally reviewed the results from the time of this admission to 10/6/2024 06:51 EDT and agree with these findings:  [x]  Laboratory  [x]  Microbiology  [x]  Radiology  [x]  EKG/Telemetry   []  Cardiology/Vascular   []  Pathology  []  Old records  []  Other:  Most notable findings include:       Lab 10/06/24  0303 10/05/24  0423 10/04/24  1846   WBC  --  34.36* 17.29*    HEMOGLOBIN  --  13.8 15.7   HEMATOCRIT  --  41.0 45.7   PLATELETS  --  270 345   SODIUM 131* 134* 133*   POTASSIUM 4.3 3.8 3.7   CHLORIDE 101 100 95*   CO2 19.4* 18.6* 23.2   BUN 15 16 11   CREATININE 0.72* 1.13 0.97   GLUCOSE 131* 142* 123*   CALCIUM 8.2* 8.1* 9.5   PHOSPHORUS 2.0* 3.5  --    TOTAL PROTEIN  --   --  8.0   ALBUMIN  --   --  4.6   GLOBULIN  --   --  3.4     CT Chest Without Contrast Diagnostic    Result Date: 10/5/2024  CT CHEST WO CONTRAST DIAGNOSTIC-  Date of exam: 10/5/2024, 8:38 P.M.  Indication: Possible pneumonia; +bacteremia, nos; r50.9 - fever, unspecified.  Comparisons: 10/4/2024; 9/25/2023.  TECHNIQUE: Axial CT images were obtained of the chest without contrast administration. Reconstructed 2D coronal and sagittal images were also obtained. Automated exposure control and iterative construction methods were used.  FINDINGS: There is a tiny left pleural effusion. There is minimal if any right pleural effusion. A trace amount of pericardial effusion is seen. No cardiac enlargement. There is a small hiatal hernia. The central tracheobronchial tree is well aerated without filling defect. There may be mild subsegmental atelectasis and/or fibrosis in the lung bases. Acute infiltrate is thought to be less likely. A few scattered small pulmonary cysts are seen, as before, such as involving the anterior right lower lobe on image 207 of series 6. Atherosclerotic changes are present, including the coronary arteries as well as seen elsewhere. Endovascular stents are thought to be in place within the coronary arteries. No enlarged mediastinal lymph nodes are seen. There may be small reactive mediastinal lymph nodes. Small bilateral nonspecific axillary lymph nodes are also seen. No thyroid enlargement is suggested. Chronic calcified granulomatous disease involves the chest. Age-indeterminate left-sided perinephric inflammatory stranding is seen. There may be similar findings to a lesser degree  contralaterally. The patient has undergone splenectomy. Hepatomegaly is suspected. Degenerative changes are seen throughout the imaged spine. There may be diffuse idiopathic skeletal hyperostosis (DISH). No acute fracture. No aggressive osseous lesion is suggested.       Impression: No acute infiltrate is appreciated. Mild subsegmental atelectasis is seen in the lung bases bilaterally. There may be linear fibrosis, as well. These findings are predominantly in the dependent portions of the lungs. Please see above comments for further detail.   Portions of this note were completed with a voice recognition program.       Electronically Signed By-Jaxson Magaña MD On:10/5/2024 9:58 PM       Blood cultures with strep pneumo        Assessment & Plan   Assessment / Plan     Active Hospital Problems:  Active Hospital Problems    Diagnosis     **Septic shock      Impression:  Septic shock, present on admission  Acute hypoxic respiratory failure requiring supplemental oxygen, no home O2 at baseline  Pneumococcal pneumonia  Pneumococcal bacteremia   Dental caries  Hyponatremia, clinically insignificant  Hypocalcemia  Hypomagnesemia  Hypokalemia  Hyponatremia, clinically significant  Leukocytosis  Elevated procalcitonin 25.69     Plan:  Wean O2 to keep SPO2 greater than 90%  Chest CT was negative for pneumonia.  Given the patient's presentation, I suspect the patient had a dental infection and translocated strep into the patient's bloodstream strep pneumo into his bloodstream  Recommend following up with dentist as an outpatient for dental care  De-escalate antibiotics to ceftriaxone will de-escalate accordingly to complete antibiotics for pneumococcal bacteremia  Weaned off of pressors this morning.  Goal mean arterial pressure greater than 65  Continue nebulizers and bronchopulmonary hygiene  Trend renal panel electrolytes.  Replace calcium IV  Advance diet as tolerated  Smoking cessation encouraged.  Has nicotine patch  available    Okay to transfer out of ICU    VTE Prophylaxis:  Mechanical VTE prophylaxis orders are present.    CODE STATUS:   Code Status (Patient has no pulse and is not breathing): CPR (Attempt to Resuscitate)  Medical Interventions (Patient has pulse or is breathing): Full Support      The patient is critically ill in the ICU with pneumococcal bacteremia, septic shock, dental infection, multiple electrolyte disturbances. Multidisciplinary bedside critical care rounds were performed with nursing staff, respiratory therapy, pharmacy, nutritional services, social work. I have personally reviewed the chart, labs and any pertinent imaging available.  I have spent 30 minutes of critical care time, excluding procedures, in the care of this patient.    Electronically signed by Eder Pierson MD, 10/06/24, 3:56 PM EDT.

## 2024-10-06 NOTE — PROGRESS NOTES
[facility]   Hospitalist Progress Note  Date: 10/6/2024  Patient Name: Nba Gaffney  : 1960  MRN: [id number]  Date of admission: 10/4/2024      Subjective   Subjective     Chief Complaint: Follow up for fever, chills    Summary: 63-year-old male with CAD, HTN, HLD who presented with fever, chills, malaise, headaches.  On presentation met SIRS criteria and was hypotensive.  WBC 17.3, lactate 1.6.  Chest x-ray clear.  UA not consistent with infection.  Blood cultures 2 of 2 positive for Streptococcus pneumonia.  Admitted to the ICU, requiring Levophed/vasopressin    Interval Followup:   Weaned off pressors  Weaned to room air  Remains afebrile. Denies chills. Feeling a lot better.  No respiratory complaints.  No chest pain or palpitations.      Objective   Objective     Vitals:   Temp:  [97.5 °F (36.4 °C)-97.9 °F (36.6 °C)] 97.5 °F (36.4 °C)  Heart Rate:  [50-84] 71  Resp:  [12-20] 18  BP: ()/(36-94) 107/67  Flow (L/min):  [1-4] 1  Physical Exam    Constitutional: conversant, NAD   HEENT: NCAT.  Multiple dental caries   Respiratory:  nonlabored respirations    Cardiovascular:  RRR, no edema   Gastrointestinal: soft, nondistended   Neurologic: Alert, speech clear   Skin: Extremities warm    Result Review    Result Review:  I have personally reviewed the following over the last 24 hours (07:00 to 07:00) and agree with the following findings  [x]  Laboratory  CBC          2024    06:22 10/4/2024    18:46 10/5/2024    04:23   CBC   WBC 12.27  17.29  34.36    RBC 5.11  5.08  4.44    Hemoglobin 15.8  15.7  13.8    Hematocrit 46.0  45.7  41.0    MCV 90.0  90.0  92.3    MCH 30.9  30.9  31.1    MCHC 34.3  34.4  33.7    RDW 12.8  13.5  14.1    Platelets 401  345  270      BMP          10/4/2024    18:46 10/5/2024    04:23 10/6/2024    03:03   BMP   BUN 11  16  15    Creatinine 0.97  1.13  0.72    Sodium 133  134  131    Potassium 3.7  3.8  4.3    Chloride 95  100  101    CO2 23.2  18.6  19.4     Calcium 9.5  8.1  8.2      [x]  Microbiology  10/4 2 of 2 blood cultures Streptococcus pneumonia  10/6 blood cultures pending  [x]  Radiology   CT Chest Without Contrast Diagnostic    Result Date: 10/5/2024  CT CHEST WO CONTRAST DIAGNOSTIC-  Date of exam: 10/5/2024, 8:38 P.M.  Indication: Possible pneumonia; +bacteremia, nos; r50.9 - fever, unspecified.  Comparisons: 10/4/2024; 9/25/2023.  TECHNIQUE: Axial CT images were obtained of the chest without contrast administration. Reconstructed 2D coronal and sagittal images were also obtained. Automated exposure control and iterative construction methods were used.  FINDINGS: There is a tiny left pleural effusion. There is minimal if any right pleural effusion. A trace amount of pericardial effusion is seen. No cardiac enlargement. There is a small hiatal hernia. The central tracheobronchial tree is well aerated without filling defect. There may be mild subsegmental atelectasis and/or fibrosis in the lung bases. Acute infiltrate is thought to be less likely. A few scattered small pulmonary cysts are seen, as before, such as involving the anterior right lower lobe on image 207 of series 6. Atherosclerotic changes are present, including the coronary arteries as well as seen elsewhere. Endovascular stents are thought to be in place within the coronary arteries. No enlarged mediastinal lymph nodes are seen. There may be small reactive mediastinal lymph nodes. Small bilateral nonspecific axillary lymph nodes are also seen. No thyroid enlargement is suggested. Chronic calcified granulomatous disease involves the chest. Age-indeterminate left-sided perinephric inflammatory stranding is seen. There may be similar findings to a lesser degree contralaterally. The patient has undergone splenectomy. Hepatomegaly is suspected. Degenerative changes are seen throughout the imaged spine. There may be diffuse idiopathic skeletal hyperostosis (DISH). No acute fracture. No aggressive  osseous lesion is suggested.       No acute infiltrate is appreciated. Mild subsegmental atelectasis is seen in the lung bases bilaterally. There may be linear fibrosis, as well. These findings are predominantly in the dependent portions of the lungs. Please see above comments for further detail.   Portions of this note were completed with a voice recognition program.       Electronically Signed By-Jaxson Magaña MD On:10/5/2024 9:58 PM      XR Chest 1 View    Result Date: 10/4/2024  XR CHEST 1 VW Date of Exam: 10/4/2024 6:34 PM EDT Indication: Weak/Dizzy/AMS triage protocol Comparison: Chest CT 9/25/2023 chest radiograph 7/29/2021 Findings: Mediastinum: Cardiomediastinal silhouette appears similar to prior accounting for differences in technique Lungs: The lungs appear clear. Pleura: No pleural effusion or pneumothorax. Bones and soft tissues: No acute osseous abnormality.     Impression: No acute intrathoracic finding. Electronically Signed: Denny Gifford  10/4/2024 6:58 PM EDT  Workstation ID: [id number]     [x]  EKG/Telemetry monitor personally reviewed and independently interpreted: NSR  []  Cardiology/Vascular   []  Pathology  []  Old records  [x]  Other:    Intake/Output Summary (Last 24 hours) at 10/6/2024 0923  Last data filed at 10/6/2024 0518  Gross per 24 hour   Intake 43.6 ml   Output 550 ml   Net -506.4 ml         Assessment & Plan   Assessment / Plan     Assessment/Plan:  Septic shock  Streptococcus pneumonia bacteremia  Dental caries  Acute hypoxic respiratory failure  Metabolic acidosis  History of hypertension  History of hyperlipidemia  Non-insulin-dependent type 2 diabetes  Hypomagnesemia.  Resolved         Weaned off pressors and to room air  Continue to hold Toprol-XL  CT chest no evidence of pneumonia  Repeat blood culture pending to determine clearance  Continue Rocephin 2 g daily  Needs to follow-up with dentist  Continue Brovana and Pulmicort nebs twice daily  Continue aspirin and  statin  Continue low-dose SSI  Transfer to remote telemetry bed  CBC, BMP in AM    VTE prophylaxis: Lovenox    Discussed plan with Dr Pierson, ICU RN.    VTE Prophylaxis:  Mechanical VTE prophylaxis orders are present.    CODE STATUS:   Code Status (Patient has no pulse and is not breathing): CPR (Attempt to Resuscitate)  Medical Interventions (Patient has pulse or is breathing): Full Support    Electronically signed by Mirza Garcia DO, 10/06/24, 12:00 PM EDT.

## 2024-10-06 NOTE — PLAN OF CARE
Goal Outcome Evaluation:alert and oriented, stating feels much better than when admitted;nsr with heart rate 70's, continues to have some hypotension, levophed titrated off at moment but vaso infusing at 0.03 with bp 90's/50's map high 60's;no c/o soa, loose congested cough at times, breath sound diminished and coarse, o2 sats mid 90's on 1l/nc, chest chest performed early in shift;medicated with tylenol twice for c/o generalized achiness;one diarrhea stool this shift, no nausea noted     Addendum at 0606-vasopression titrated off as well as levo, bp 104/70 mean 81 at present

## 2024-10-07 ENCOUNTER — READMISSION MANAGEMENT (OUTPATIENT)
Dept: CALL CENTER | Facility: HOSPITAL | Age: 64
End: 2024-10-07
Payer: COMMERCIAL

## 2024-10-07 VITALS
RESPIRATION RATE: 20 BRPM | DIASTOLIC BLOOD PRESSURE: 83 MMHG | TEMPERATURE: 97.6 F | BODY MASS INDEX: 31.88 KG/M2 | SYSTOLIC BLOOD PRESSURE: 142 MMHG | WEIGHT: 210.32 LBS | OXYGEN SATURATION: 98 % | HEIGHT: 68 IN | HEART RATE: 85 BPM

## 2024-10-07 LAB
ANION GAP SERPL CALCULATED.3IONS-SCNC: 13.5 MMOL/L (ref 5–15)
BACTERIA SPEC AEROBE CULT: ABNORMAL
BACTERIA SPEC AEROBE CULT: ABNORMAL
BASOPHILS # BLD AUTO: 0.11 10*3/MM3 (ref 0–0.2)
BASOPHILS NFR BLD AUTO: 0.5 % (ref 0–1.5)
BUN SERPL-MCNC: 10 MG/DL (ref 8–23)
BUN/CREAT SERPL: 14.1 (ref 7–25)
CALCIUM SPEC-SCNC: 8.8 MG/DL (ref 8.6–10.5)
CHLORIDE SERPL-SCNC: 105 MMOL/L (ref 98–107)
CO2 SERPL-SCNC: 22.5 MMOL/L (ref 22–29)
CREAT SERPL-MCNC: 0.71 MG/DL (ref 0.76–1.27)
DEPRECATED RDW RBC AUTO: 48.9 FL (ref 37–54)
EGFRCR SERPLBLD CKD-EPI 2021: 104.4 ML/MIN/1.73
EOSINOPHIL # BLD AUTO: 0.03 10*3/MM3 (ref 0–0.4)
EOSINOPHIL NFR BLD AUTO: 0.1 % (ref 0.3–6.2)
ERYTHROCYTE [DISTWIDTH] IN BLOOD BY AUTOMATED COUNT: 14.5 % (ref 12.3–15.4)
GLUCOSE BLDC GLUCOMTR-MCNC: 106 MG/DL (ref 70–99)
GLUCOSE BLDC GLUCOMTR-MCNC: 148 MG/DL (ref 70–99)
GLUCOSE SERPL-MCNC: 101 MG/DL (ref 65–99)
GRAM STN SPEC: ABNORMAL
HCT VFR BLD AUTO: 40.9 % (ref 37.5–51)
HGB BLD-MCNC: 13.7 G/DL (ref 13–17.7)
IMM GRANULOCYTES # BLD AUTO: 0.12 10*3/MM3 (ref 0–0.05)
IMM GRANULOCYTES NFR BLD AUTO: 0.5 % (ref 0–0.5)
ISOLATED FROM: ABNORMAL
ISOLATED FROM: ABNORMAL
LYMPHOCYTES # BLD AUTO: 3.64 10*3/MM3 (ref 0.7–3.1)
LYMPHOCYTES NFR BLD AUTO: 16.5 % (ref 19.6–45.3)
MAGNESIUM SERPL-MCNC: 1.9 MG/DL (ref 1.6–2.4)
MCH RBC QN AUTO: 30.7 PG (ref 26.6–33)
MCHC RBC AUTO-ENTMCNC: 33.5 G/DL (ref 31.5–35.7)
MCV RBC AUTO: 91.7 FL (ref 79–97)
MONOCYTES # BLD AUTO: 1.3 10*3/MM3 (ref 0.1–0.9)
MONOCYTES NFR BLD AUTO: 5.9 % (ref 5–12)
NEUTROPHILS NFR BLD AUTO: 16.87 10*3/MM3 (ref 1.7–7)
NEUTROPHILS NFR BLD AUTO: 76.5 % (ref 42.7–76)
NRBC BLD AUTO-RTO: 0 /100 WBC (ref 0–0.2)
PLATELET # BLD AUTO: 239 10*3/MM3 (ref 140–450)
PMV BLD AUTO: 9.5 FL (ref 6–12)
POTASSIUM SERPL-SCNC: 3.6 MMOL/L (ref 3.5–5.2)
PROCALCITONIN SERPL-MCNC: 5.59 NG/ML (ref 0–0.25)
RBC # BLD AUTO: 4.46 10*6/MM3 (ref 4.14–5.8)
SODIUM SERPL-SCNC: 141 MMOL/L (ref 136–145)
WBC NRBC COR # BLD AUTO: 22.07 10*3/MM3 (ref 3.4–10.8)

## 2024-10-07 PROCEDURE — 25010000002 CEFTRIAXONE PER 250 MG: Performed by: INTERNAL MEDICINE

## 2024-10-07 PROCEDURE — 94799 UNLISTED PULMONARY SVC/PX: CPT

## 2024-10-07 PROCEDURE — 99232 SBSQ HOSP IP/OBS MODERATE 35: CPT | Performed by: INTERNAL MEDICINE

## 2024-10-07 PROCEDURE — 84145 PROCALCITONIN (PCT): CPT | Performed by: INTERNAL MEDICINE

## 2024-10-07 PROCEDURE — 83735 ASSAY OF MAGNESIUM: CPT | Performed by: INTERNAL MEDICINE

## 2024-10-07 PROCEDURE — 85025 COMPLETE CBC W/AUTO DIFF WBC: CPT | Performed by: INTERNAL MEDICINE

## 2024-10-07 PROCEDURE — 82948 REAGENT STRIP/BLOOD GLUCOSE: CPT | Performed by: STUDENT IN AN ORGANIZED HEALTH CARE EDUCATION/TRAINING PROGRAM

## 2024-10-07 PROCEDURE — 82948 REAGENT STRIP/BLOOD GLUCOSE: CPT

## 2024-10-07 PROCEDURE — 25010000002 KETOROLAC TROMETHAMINE PER 15 MG: Performed by: PHYSICIAN ASSISTANT

## 2024-10-07 PROCEDURE — 80048 BASIC METABOLIC PNL TOTAL CA: CPT | Performed by: STUDENT IN AN ORGANIZED HEALTH CARE EDUCATION/TRAINING PROGRAM

## 2024-10-07 PROCEDURE — 99239 HOSP IP/OBS DSCHRG MGMT >30: CPT | Performed by: INTERNAL MEDICINE

## 2024-10-07 PROCEDURE — 94664 DEMO&/EVAL PT USE INHALER: CPT

## 2024-10-07 RX ORDER — AMOXICILLIN 500 MG/1
1000 CAPSULE ORAL EVERY 8 HOURS SCHEDULED
Qty: 42 CAPSULE | Refills: 0 | Status: SHIPPED | OUTPATIENT
Start: 2024-10-08 | End: 2024-10-15

## 2024-10-07 RX ORDER — AMOXICILLIN 500 MG/1
1000 CAPSULE ORAL EVERY 8 HOURS SCHEDULED
Status: DISCONTINUED | OUTPATIENT
Start: 2024-10-08 | End: 2024-10-07 | Stop reason: HOSPADM

## 2024-10-07 RX ORDER — KETOROLAC TROMETHAMINE 30 MG/ML
15 INJECTION, SOLUTION INTRAMUSCULAR; INTRAVENOUS ONCE
Status: COMPLETED | OUTPATIENT
Start: 2024-10-07 | End: 2024-10-07

## 2024-10-07 RX ORDER — CYCLOBENZAPRINE HCL 5 MG
5 TABLET ORAL ONCE AS NEEDED
Status: COMPLETED | OUTPATIENT
Start: 2024-10-07 | End: 2024-10-07

## 2024-10-07 RX ORDER — LIDOCAINE 4 G/G
1 PATCH TOPICAL
Status: DISCONTINUED | OUTPATIENT
Start: 2024-10-07 | End: 2024-10-07 | Stop reason: HOSPADM

## 2024-10-07 RX ADMIN — BUDESONIDE 0.5 MG: 0.5 INHALANT RESPIRATORY (INHALATION) at 09:30

## 2024-10-07 RX ADMIN — CYCLOBENZAPRINE HYDROCHLORIDE 5 MG: 5 TABLET, FILM COATED ORAL at 00:15

## 2024-10-07 RX ADMIN — NICOTINE 1 PATCH: 21 PATCH, EXTENDED RELEASE TRANSDERMAL at 08:42

## 2024-10-07 RX ADMIN — Medication 10 ML: at 08:41

## 2024-10-07 RX ADMIN — CEFTRIAXONE SODIUM 2000 MG: 2 INJECTION, POWDER, FOR SOLUTION INTRAMUSCULAR; INTRAVENOUS at 00:13

## 2024-10-07 RX ADMIN — LIDOCAINE 1 PATCH: 4 PATCH TOPICAL at 00:15

## 2024-10-07 RX ADMIN — ASPIRIN 81 MG: 81 TABLET, COATED ORAL at 08:40

## 2024-10-07 RX ADMIN — KETOROLAC TROMETHAMINE 15 MG: 30 INJECTION, SOLUTION INTRAMUSCULAR; INTRAVENOUS at 00:15

## 2024-10-07 RX ADMIN — ARFORMOTEROL TARTRATE 15 MCG: 15 SOLUTION RESPIRATORY (INHALATION) at 09:30

## 2024-10-07 NOTE — OUTREACH NOTE
Prep Survey      Flowsheet Row Responses   [facility] patient discharged from? [facility]   Is LACE score < 7 ? No   Eligibility [facility]   Date of Admission 10/04/24   Date of Discharge 10/07/24   Discharge Disposition Home or Self Care   Discharge diagnosis Bacteremia due to Streptococcus pneumoniae, septic shock   Does the patient have one of the following disease processes/diagnoses(primary or secondary)? Sepsis   Does the patient have Home health ordered? No   Is there a DME ordered? No   Prep survey completed? Yes            Nanda LAWRENCE - Registered Nurse

## 2024-10-07 NOTE — PLAN OF CARE
Goal Outcome Evaluation:  Plan of Care Reviewed With: patient        Progress: improving  Outcome Evaluation: Pt A&Ox4, on room air. Pt up ad francisco and walking to and from room and front lobby this shift. Medicated with tylenol per mar for pt chronic back pain with no relief per pt. Orders obtained for lidocaine patch and toradol and flexiril one time dose. Blood glucose monitored. Pt resting in bed, no needs/issues at this time.

## 2024-10-07 NOTE — PROGRESS NOTES
Pulmonary / Critical Care Progress Note      Patient Name: Nba Gaffney  : 1960  MRN: [id number]  Attending:  Mirza Garcia DO   Date of admission: 10/4/2024    Subjective   Subjective   Follow-up for septic shock    Over past 24 hours:  Weaned off pressors.  Transferred out of ICU.  CT was negative for pneumonia  Does report having a dental abscess a few weeks ago and reports poor dentition and dental caries, question source of infection  Was having some mouth pain recently  Multiple electrolyte disturbances  Remains on antibiotics  No fevers  Urine output improved      Objective   Objective     Vitals:   Vital signs for last 24 hours:  Temp:  [97.4 °F (36.3 °C)-99.7 °F (37.6 °C)] 97.9 °F (36.6 °C)  Heart Rate:  [] 75  Resp:  [20-22] 20  BP: ()/(46-83) 143/74    Intake/Output last 3 shifts:  I/O last 3 completed shifts:  In: -   Out: 3050 [Urine:3050]  Intake/Output this shift:  No intake/output data recorded.    Vent settings for last 24 hours:       Hemodynamic parameters for last 24 hours:       Physical Exam   Vital Signs Reviewed   General:  WDWN, Alert, NAD.    HEENT:  PERRL, EOMI.  OP with poor dentition, nares clear  Chest:  good aeration, clear to auscultation bilaterally, tympanic to percussion bilaterally, no work of breathing noted  CV: RRR, no MGR, pulses 2+, equal.  Abd:  Soft, NT, ND, + BS, no HSM  EXT:  no clubbing, no cyanosis, no edema  Neuro:  A&Ox3, CN grossly intact, no focal deficits.  Skin: No rashes or lesions noted        Result Review    Result Review:  I have personally reviewed the results from the time of this admission to 10/7/2024 07:39 EDT and agree with these findings:  [x]  Laboratory  [x]  Microbiology  [x]  Radiology  [x]  EKG/Telemetry   []  Cardiology/Vascular   []  Pathology  []  Old records  []  Other:  Most notable findings include:       Lab 10/07/24  0446 10/06/24  0303 10/05/24  0423 10/04/24  1846   WBC 22.07*  --  34.36* 17.29*   HEMOGLOBIN 13.7   --  13.8 15.7   HEMATOCRIT 40.9  --  41.0 45.7   PLATELETS 239  --  270 345   SODIUM 141 131* 134* 133*   POTASSIUM 3.6 4.3 3.8 3.7   CHLORIDE 105 101 100 95*   CO2 22.5 19.4* 18.6* 23.2   BUN 10 15 16 11   CREATININE 0.71* 0.72* 1.13 0.97   GLUCOSE 101* 131* 142* 123*   CALCIUM 8.8 8.2* 8.1* 9.5   PHOSPHORUS  --  2.0* 3.5  --    TOTAL PROTEIN  --   --   --  8.0   ALBUMIN  --   --   --  4.6   GLOBULIN  --   --   --  3.4     CT Chest Without Contrast Diagnostic    Result Date: 10/5/2024  CT CHEST WO CONTRAST DIAGNOSTIC-  Date of exam: 10/5/2024, 8:38 P.M.  Indication: Possible pneumonia; +bacteremia, nos; r50.9 - fever, unspecified.  Comparisons: 10/4/2024; 9/25/2023.  TECHNIQUE: Axial CT images were obtained of the chest without contrast administration. Reconstructed 2D coronal and sagittal images were also obtained. Automated exposure control and iterative construction methods were used.  FINDINGS: There is a tiny left pleural effusion. There is minimal if any right pleural effusion. A trace amount of pericardial effusion is seen. No cardiac enlargement. There is a small hiatal hernia. The central tracheobronchial tree is well aerated without filling defect. There may be mild subsegmental atelectasis and/or fibrosis in the lung bases. Acute infiltrate is thought to be less likely. A few scattered small pulmonary cysts are seen, as before, such as involving the anterior right lower lobe on image 207 of series 6. Atherosclerotic changes are present, including the coronary arteries as well as seen elsewhere. Endovascular stents are thought to be in place within the coronary arteries. No enlarged mediastinal lymph nodes are seen. There may be small reactive mediastinal lymph nodes. Small bilateral nonspecific axillary lymph nodes are also seen. No thyroid enlargement is suggested. Chronic calcified granulomatous disease involves the chest. Age-indeterminate left-sided perinephric inflammatory stranding is seen. There  may be similar findings to a lesser degree contralaterally. The patient has undergone splenectomy. Hepatomegaly is suspected. Degenerative changes are seen throughout the imaged spine. There may be diffuse idiopathic skeletal hyperostosis (DISH). No acute fracture. No aggressive osseous lesion is suggested.       Impression: No acute infiltrate is appreciated. Mild subsegmental atelectasis is seen in the lung bases bilaterally. There may be linear fibrosis, as well. These findings are predominantly in the dependent portions of the lungs. Please see above comments for further detail.   Portions of this note were completed with a voice recognition program.       Electronically Signed By-Jaxson Magaña MD On:10/5/2024 9:58 PM       Blood cultures with strep pneumo        Assessment & Plan   Assessment / Plan     Active Hospital Problems:  Active Hospital Problems    Diagnosis     **Bacteremia due to Streptococcus pneumoniae      Impression:  Septic shock, present on admission  Acute hypoxic respiratory failure requiring supplemental oxygen, no home O2 at baseline  Pneumococcal pneumonia  Pneumococcal bacteremia   Dental caries  Hyponatremia, clinically insignificant  Hypocalcemia  Hypomagnesemia  Hypokalemia  Hyponatremia, clinically significant  Leukocytosis  Elevated procalcitonin 25.69     Plan:  Wean O2 to keep SPO2 greater than 90%  Chest CT was negative for pneumonia.  Given the patient's presentation, I suspect the patient had a dental infection and translocated strep into the patient's bloodstream strep pneumo into his bloodstream  Recommend following up with dentist as an outpatient for dental care  Continue IV ceftriaxone.  Continue nebulizers and bronchopulmonary hygiene  Trend renal panel electrolytes.  Replace calcium IV  Advance diet as tolerated  Continue with nicotine patch.      VTE Prophylaxis:  Pharmacologic & mechanical VTE prophylaxis orders are present.    CODE STATUS:   Code Status (Patient has  no pulse and is not breathing): CPR (Attempt to Resuscitate)  Medical Interventions (Patient has pulse or is breathing): Full Support    I have reviewed labs, imaging, pertinent clinical data and provider notes.   I have discussed with bedside nurse and primary service.     Electronically signed by Maurice Max MD, 10/07/24, 7:39 AM EDT.

## 2024-10-07 NOTE — DISCHARGE SUMMARY
[facility]         HOSPITALIST  DISCHARGE SUMMARY    Patient Name: Nba Gaffney  : 1960  MRN: [id number]    Date of Admission: 10/4/2024  Date of Discharge:  10/07/24  Primary Care Physician: Eugene Campos MD    Consults       Date and Time Order Name Status Description    10/5/2024  2:39 PM Inpatient Pulmonology Consult Completed     10/5/2024 12:19 AM Inpatient Hospitalist Consult              Active and Resolved Hospital Problems:  Septic shock  Streptococcus pneumonia bacteremia  Dental caries  Acute hypoxic respiratory failure  Metabolic acidosis  History of hypertension  History of hyperlipidemia  Non-insulin-dependent type 2 diabetes  Hypomagnesemia.    Hospital Course     Hospital Course:  Nba Gaffney is a 63 y.o. male with CAD, HTN, HLD who presented with fever, chills, malaise, headaches.  On presentation met SIRS criteria and was hypotensive.  WBC 17.3, lactate 1.6  UA not consistent with infection.  CT chest no infiltrate.  Source was dental caries. Blood cultures 2 of 2 positive for Streptococcus pneumonia.  Admitted to the ICU due to need for vasopressors.  Had a rapid improvement with IV antibiotic.  White count improved, procalcitonin down trended.  Remained afebrile hemodynamically stable.  Blood cultures cleared on 10/6.  Patient was adamant about going home.  He appeared nontoxic, tolerating oral intake and ambulatory.  Transition to oral antibiotic regimen to complete 10-day total course.  Emphasized importance of following up with dentist as soon as possible.  Recommended follow-up with PCP within 1 week.  Return to hospital precautions discussed.  Discharged home in stable condition.    Day of Discharge     Vital Signs:  Temp:  [97.4 °F (36.3 °C)-99 °F (37.2 °C)] 97.9 °F (36.6 °C)  Heart Rate:  [] 81  Resp:  [20-22] 20  BP: (107-143)/(51-74) 143/74  Physical Exam:   GENERAL: conversant and nontoxic.  HEENT: dental caries  HEART: RRR. No edema  LUNGS:  nonlabored  ABDOMEN: Soft, nondistended  NEUROLOGIC: Alert, CN intact    Discharge Details        Discharge Medications        New Medications        Instructions Start Date   amoxicillin 500 MG capsule  Commonly known as: AMOXIL   1,000 mg, Oral, Every 8 Hours Scheduled   Start Date: October 8, 2024            Continue These Medications        Instructions Start Date   acetaminophen 650 MG 8 hr tablet  Commonly known as: TYLENOL   650 mg, Oral, Every 8 Hours PRN      Aspirin Low Dose 81 MG EC tablet  Generic drug: aspirin   81 mg, Oral, Daily      atorvastatin 40 MG tablet  Commonly known as: LIPITOR   40 mg, Oral, Nightly      metFORMIN 850 MG tablet  Commonly known as: GLUCOPHAGE   850 mg, Oral, 2 Times Daily With Meals      metoprolol succinate XL 25 MG 24 hr tablet  Commonly known as: TOPROL-XL   25 mg, Oral, Daily, Needs appt      multivitamin with minerals tablet tablet   1 tablet, Oral, Daily               No Known Allergies    Discharge Disposition:  Home or Self Care    Diet:  Hospital:  Diet Order   Procedures   • Diet: Cardiac; Healthy Heart (2-3 Na+); Fluid Consistency: Thin (IDDSI 0)       Discharge Activity:   Activity Instructions       Activity as Tolerated              CODE STATUS:  Code Status and Medical Interventions: CPR (Attempt to Resuscitate); Full Support   Ordered at: 10/05/24 0047     Code Status (Patient has no pulse and is not breathing):    CPR (Attempt to Resuscitate)     Medical Interventions (Patient has pulse or is breathing):    Full Support         Future Appointments   Date Time Provider Department Center   11/7/2024  4:15 PM Eugene Campos MD [facility]       Additional Instructions for the Follow-ups that You Need to Schedule       Discharge Follow-up with PCP   As directed       Currently Documented PCP:    Eugene Campos MD    PCP Phone Number:    [phone]     Follow Up Details: 1 week                Pertinent  and/or Most Recent Results     PROCEDURES:   NONE    LAB  RESULTS:      Lab 10/07/24  0446 10/05/24  0612 10/05/24  0423 10/04/24  1846   WBC 22.07*  --  34.36* 17.29*   HEMOGLOBIN 13.7  --  13.8 15.7   HEMATOCRIT 40.9  --  41.0 45.7   PLATELETS 239  --  270 345   NEUTROS ABS 16.87*  --   --  16.28*   IMMATURE GRANS (ABS) 0.12*  --   --  0.09*   LYMPHS ABS 3.64*  --   --  0.77   MONOS ABS 1.30*  --   --  0.06*   EOS ABS 0.03  --   --  0.01   MCV 91.7  --  92.3 90.0   CRP  --  9.61*  --   --    PROCALCITONIN 5.59* 25.69*  --   --    LACTATE  --   --   --  1.6         Lab 10/07/24  0446 10/06/24  0303 10/05/24  0423 10/04/24  1846   SODIUM 141 131* 134* 133*   POTASSIUM 3.6 4.3 3.8 3.7   CHLORIDE 105 101 100 95*   CO2 22.5 19.4* 18.6* 23.2   ANION GAP 13.5 10.6 15.4* 14.8   BUN 10 15 16 11   CREATININE 0.71* 0.72* 1.13 0.97   EGFR 104.4 103.9 73.9 88.8   GLUCOSE 101* 131* 142* 123*   CALCIUM 8.8 8.2* 8.1* 9.5   MAGNESIUM 1.9 2.2 1.2* 1.5*   PHOSPHORUS  --  2.0* 3.5  --          Lab 10/04/24  1846   TOTAL PROTEIN 8.0   ALBUMIN 4.6   GLOBULIN 3.4   ALT (SGPT) 31   AST (SGOT) 27   BILIRUBIN 0.8   ALK PHOS 68         Lab 10/05/24  0612 10/05/24  0423 10/04/24  1846   HSTROP T 16 18 16                 Brief Urine Lab Results  (Last result in the past 365 days)        Color   Clarity   Blood   Leuk Est   Nitrite   Protein   CREAT   Urine HCG        10/04/24 2134 Yellow   Clear   Negative   Negative   Negative   30 mg/dL (1+)                 Microbiology Results (last 10 days)       Procedure Component Value - Date/Time    Blood Culture - Blood, Arm, Left [[id number]]  (Normal) Collected: 10/06/24 0934    Lab Status: Preliminary result Specimen: Blood from Arm, Left Updated: 10/07/24 1015     Blood Culture No growth at 24 hours    MRSA Screen, PCR (Inpatient) - Swab, Nares [[id number]]  (Normal) Collected: 10/05/24 0316    Lab Status: Final result Specimen: Swab from Nares Updated: 10/05/24 0603     MRSA PCR No MRSA Detected    Narrative:      The negative predictive value of this  diagnostic test is high and should only be used to consider de-escalating anti-MRSA therapy. A positive result may indicate colonization with MRSA and must be correlated clinically.    COVID-19, FLU A/B, RSV PCR 1 HR TAT - Swab, Nasopharynx [[id number]]  (Normal) Collected: 10/04/24 1859    Lab Status: Final result Specimen: Swab from Nasopharynx Updated: 10/04/24 1942     COVID19 Not Detected     Influenza A PCR Not Detected     Influenza B PCR Not Detected     RSV, PCR Not Detected    Narrative:      Fact sheet for providers: https://www.fda.gov/media/477890/download    Fact sheet for patients: https://www.fda.gov/media/123933/download    Test performed by PCR.    Blood Culture - Blood, Arm, Right [[id number]]  (Abnormal) Collected: 10/04/24 1846    Lab Status: Final result Specimen: Blood from Arm, Right Updated: 10/07/24 0630     Blood Culture Streptococcus pneumoniae     Isolated from Aerobic and Anaerobic Bottles     Gram Stain Aerobic Bottle Gram positive cocci in pairs and chains      Anaerobic Bottle Gram positive cocci in pairs and chains    Narrative:      Refer to previous blood culture collected on 10/04/2024 at 1846 for MICs.      Blood Culture - Blood, Arm, Left [[id number]]  (Abnormal)  (Susceptibility) Collected: 10/04/24 1846    Lab Status: Final result Specimen: Blood from Arm, Left Updated: 10/07/24 0629     Blood Culture Streptococcus pneumoniae     Isolated from Aerobic and Anaerobic Bottles     Gram Stain Aerobic Bottle Gram positive cocci in pairs and chains      Anaerobic Bottle Gram positive cocci in pairs and chains    Susceptibility        Streptococcus pneumoniae      NATHAN      Ceftriaxone (Meningitis) Susceptible      Ceftriaxone (Non-meningitis) Susceptible      Levofloxacin Susceptible      Penicillin (Meningitis) Resistant      Penicillin (Non-Meningitis) Susceptible      Vancomycin Susceptible                           Blood Culture ID, PCR - Blood, Arm, Left [[id number]]  (Abnormal)  Collected: 10/04/24 1846    Lab Status: Final result Specimen: Blood from Arm, Left Updated: 10/05/24 0707     BCID, PCR Streptococcus pneumoniae. Identification by BCID2 PCR.     BOTTLE TYPE Aerobic Bottle            CT Chest Without Contrast Diagnostic    Result Date: 10/5/2024  No acute infiltrate is appreciated. Mild subsegmental atelectasis is seen in the lung bases bilaterally. There may be linear fibrosis, as well. These findings are predominantly in the dependent portions of the lungs. Please see above comments for further detail.   Portions of this note were completed with a voice recognition program.       Electronically Signed By-Jaxson Magaña MD On:10/5/2024 9:58 PM      XR Chest 1 View    Result Date: 10/4/2024  Impression: No acute intrathoracic finding. Electronically Signed: Denny Gifford  10/4/2024 6:58 PM EDT  Workstation ID: [id number]              Results for orders placed in visit on 07/30/24    Adult Transthoracic Echo Complete W/ Cont if Necessary Per Protocol    Interpretation Summary  •  Left ventricular ejection fraction appears to be 46 - 50%.  •  The left ventricular cavity is mildly dilated.  •  Left ventricular diastolic function was normal.  •  The left atrial cavity is mildly dilated.  •  Left atrial volume is mildly increased.  •  Estimated right ventricular systolic pressure from tricuspid regurgitation is normal (<35 mmHg).      Labs Pending at Discharge:  Pending Labs       Order Current Status    Blood Culture - Blood, Arm, Left Preliminary result              Time spent on Discharge including face to face service:  >30 minutes    Electronically signed by Mirza Garcia DO, 10/07/24, 11:27 AM EDT.

## 2024-10-07 NOTE — PLAN OF CARE
Goal Outcome Evaluation:  Plan of Care Reviewed With: patient, spouse        Progress: improving  Outcome Evaluation: PT is AAOx4, VSS, afebrile, no c/o acute pain, blood cultures show no growth in last 24 hrs, no c/o of SOA, N/V/D, tolerating diet, voiding without difficulty. Will DC home with spouse today, followup appts have been made, M2B (Amoxicillin) delivered to patient bedside and reviewed with patient understanding. DC instructions printed and reviewed with patient understanding. Has been ad-francisco on unit and ambulates without difficulty.

## 2024-10-08 ENCOUNTER — TRANSITIONAL CARE MANAGEMENT TELEPHONE ENCOUNTER (OUTPATIENT)
Dept: CALL CENTER | Facility: HOSPITAL | Age: 64
End: 2024-10-08
Payer: COMMERCIAL

## 2024-10-08 NOTE — OUTREACH NOTE
Call Center TCM Note      Flowsheet Row Responses   [facility] patient discharged from? [facility]   Does the patient have one of the following disease processes/diagnoses(primary or secondary)? Sepsis   TCM attempt successful? Yes  [VR lists Watson-wife]   Call start time 1335   Call end time 1338   Discharge diagnosis Bacteremia due to Streptococcus pneumoniae, septic shock   Meds reviewed with patient/caregiver? Yes   Is the patient having any side effects they believe may be caused by any medication additions or changes? No   Does the patient have all medications related to this admission filled (includes all antibiotics, inhalers, nebulizers,steroids,etc.) Yes   Is the patient taking all medications as directed (includes completed medication regime)? Yes   Comments 10/15/2024  8:45 AM  HOSPITAL FOLLOW UP   Does the patient have an appointment with their PCP within 7-14 days of discharge? Yes   Comments Pt reports that he is doing well, has no issues tolerating po intake or voiding. Pt denies fever, chills or other issues. Pt denies questions or concerns.   Did the patient receive a copy of their discharge instructions? Yes   Nursing interventions Reviewed instructions with patient   What is the patient's perception of their health status since discharge? Returned to baseline/stable   Nursing interventions Nurse provided patient education   Is the patient/caregiver able to teach back TIME? T emperature - higher or lower than normal, E xtremely Ill - severe pain, discomfort, shortness of breath   Nursing interventions Nurse provided reassurance to patient, Nurse provided patient education   Is patient/caregiver able to teach back steps to recovery at home? Rest and regain strength   Is the patient/caregiver able to teach back signs and symptoms of worsening condition: Fever, Shortness of breath/rapid respiratory rate   TCM call completed? Yes   Call end time 1338            Albania Phillips RN    10/8/2024,  13:39 EDT

## 2024-10-11 LAB — BACTERIA SPEC AEROBE CULT: NORMAL

## 2024-10-23 ENCOUNTER — OFFICE VISIT (OUTPATIENT)
Dept: FAMILY MEDICINE CLINIC | Facility: CLINIC | Age: 64
End: 2024-10-23
Payer: COMMERCIAL

## 2024-10-23 VITALS
OXYGEN SATURATION: 97 % | BODY MASS INDEX: 30.8 KG/M2 | DIASTOLIC BLOOD PRESSURE: 76 MMHG | WEIGHT: 203.2 LBS | SYSTOLIC BLOOD PRESSURE: 139 MMHG | RESPIRATION RATE: 18 BRPM | HEART RATE: 90 BPM | TEMPERATURE: 97.1 F | HEIGHT: 68 IN

## 2024-10-23 DIAGNOSIS — R50.9 FEVER, UNSPECIFIED FEVER CAUSE: ICD-10-CM

## 2024-10-23 DIAGNOSIS — R65.10 SYSTEMIC INFLAMMATORY RESPONSE SYNDROME (SIRS): ICD-10-CM

## 2024-10-23 DIAGNOSIS — G89.29 CHRONIC BILATERAL LOW BACK PAIN WITHOUT SCIATICA: ICD-10-CM

## 2024-10-23 DIAGNOSIS — Z09 HOSPITAL DISCHARGE FOLLOW-UP: Primary | ICD-10-CM

## 2024-10-23 DIAGNOSIS — M54.50 CHRONIC BILATERAL LOW BACK PAIN WITHOUT SCIATICA: ICD-10-CM

## 2024-10-23 PROCEDURE — 99214 OFFICE O/P EST MOD 30 MIN: CPT | Performed by: STUDENT IN AN ORGANIZED HEALTH CARE EDUCATION/TRAINING PROGRAM

## 2024-10-23 RX ORDER — METHOCARBAMOL 750 MG/1
750 TABLET, FILM COATED ORAL 4 TIMES DAILY PRN
Qty: 30 TABLET | Refills: 1 | Status: SHIPPED | OUTPATIENT
Start: 2024-10-23

## 2024-10-23 RX ORDER — HYDROCODONE BITARTRATE AND ACETAMINOPHEN 5; 325 MG/1; MG/1
1 TABLET ORAL EVERY 6 HOURS PRN
Qty: 20 TABLET | Refills: 0 | Status: SHIPPED | OUTPATIENT
Start: 2024-10-23

## 2024-10-23 NOTE — PROGRESS NOTES
"Chief Complaint  Hospital discharge follow-up, patient was discharged on 10/7/2024    Patient is also following up on recent urgent care visit, visited urgent care for completely different issue    Subjective         Nba Gaffney is a 64 y.o. male who presents to [facility]    64 years old comes to the clinic today to follow-up/acute symptoms and follow-up on recent hospital discharge.    Patient was hospitalized for septic shock/bacteremia.  Treated appropriately in the hospital, blood culture was positive for Streptococcus pneumonia which was possibly due to dental caries.  Patient was discharged home on amoxicillin which he has finished.  Last blood culture was negative.    Patient visited urgent care for acute lower back pain, patient is following up with pain management.  Patient has intervention scheduled in 10 days with pain management to get lower back shots.  Patient just need some stronger medication until he gets procedure done.  Patient does not report any radiating symptoms, no lower urinary tract symptoms or any febrile symptoms.    12+ review of systems are unremarkable otherwise.        Objective   Vital Signs:   Vitals:    10/23/24 1604   BP: 139/76   Pulse: 90   Resp: 18   Temp: 97.1 °F (36.2 °C)   SpO2: 97%   Weight: 92.2 kg (203 lb 3.2 oz)   Height: 172.7 cm (68\")      Body mass index is 30.9 kg/m².   Wt Readings from Last 3 Encounters:   10/23/24 92.2 kg (203 lb 3.2 oz)   10/08/24 95.3 kg (210 lb)   10/04/24 95.4 kg (210 lb 5.1 oz)      BP Readings from Last 3 Encounters:   10/23/24 139/76   10/08/24 162/86   10/07/24 142/83        Patient Care Team:  Eugene Campos MD as PCP - General (Family Medicine)  Yuval Snider MD as Consulting Physician (Hematology and Oncology)     Physical Exam  Vitals reviewed.   Constitutional:       Appearance: Normal appearance. He is well-developed.   HENT:      Head: Normocephalic and atraumatic.      Right Ear: " External ear normal.      Left Ear: External ear normal.      Mouth/Throat:      Pharynx: No oropharyngeal exudate.   Eyes:      Conjunctiva/sclera: Conjunctivae normal.      Pupils: Pupils are equal, round, and reactive to light.   Cardiovascular:      Rate and Rhythm: Normal rate and regular rhythm.      Heart sounds: No murmur heard.     No friction rub. No gallop.   Pulmonary:      Effort: Pulmonary effort is normal.      Breath sounds: Normal breath sounds. No wheezing or rhonchi.   Abdominal:      General: Bowel sounds are normal. There is no distension.      Palpations: Abdomen is soft.      Tenderness: There is no abdominal tenderness.   Musculoskeletal:        Arms:       Comments: Some soreness and stiffness noted at marked above   Skin:     General: Skin is warm and dry.   Neurological:      Mental Status: He is alert and oriented to person, place, and time.      Cranial Nerves: No cranial nerve deficit.   Psychiatric:         Mood and Affect: Mood and affect normal.         Behavior: Behavior normal.         Thought Content: Thought content normal.         Judgment: Judgment normal.            BMI is >= 30 and <35. (Class 1 Obesity). The following options were offered after discussion;: weight loss educational material (shared in after visit summary) and exercise counseling/recommendations              Assessment and Plan   Diagnoses and all orders for this visit:    1. Hospital discharge follow-up (Primary)  Comments:  Hospital discharge summary/workup reviewed.  Orders:  -     CBC w AUTO Differential; Future  -     Comprehensive metabolic panel; Future    2. Systemic inflammatory response syndrome (SIRS)  Comments:  Resolved, hemodynamically stable.  Repeat CBC and CMP to follow-up on leukocytosis.  Orders:  -     CBC w AUTO Differential; Future  -     Comprehensive metabolic panel; Future    3. Fever, unspecified fever cause  Comments:  Resolved  Orders:  -     CBC w AUTO Differential; Future  -      Comprehensive metabolic panel; Future    4. Chronic bilateral low back pain without sciatica  Comments:  Acute.  Hydrocodone/methocarbamol until patient sees pain management.  Medication side effects/controlled nature discussed.  Orders:  -     CBC w AUTO Differential; Future  -     Comprehensive metabolic panel; Future  -     HYDROcodone-acetaminophen (NORCO) 5-325 MG per tablet; Take 1 tablet by mouth Every 6 (Six) Hours As Needed for Moderate Pain.  Dispense: 20 tablet; Refill: 0  -     methocarbamol (ROBAXIN) 750 MG tablet; Take 1 tablet by mouth 4 (Four) Times a Day As Needed for Muscle Spasms.  Dispense: 30 tablet; Refill: 1          Tobacco Use: High Risk (10/23/2024)    Patient History     Smoking Tobacco Use: Every Day     Smokeless Tobacco Use: Never     Passive Exposure: Current            Follow Up   No follow-ups on file.  Patient was given instructions and counseling regarding his condition or for health maintenance advice. Please see specific information pulled into the AVS if appropriate.

## 2024-10-24 ENCOUNTER — LAB (OUTPATIENT)
Dept: LAB | Facility: HOSPITAL | Age: 64
End: 2024-10-24
Payer: COMMERCIAL

## 2024-10-24 DIAGNOSIS — R50.9 FEVER, UNSPECIFIED FEVER CAUSE: ICD-10-CM

## 2024-10-24 DIAGNOSIS — R65.10 SYSTEMIC INFLAMMATORY RESPONSE SYNDROME (SIRS): ICD-10-CM

## 2024-10-24 DIAGNOSIS — M54.50 CHRONIC BILATERAL LOW BACK PAIN WITHOUT SCIATICA: ICD-10-CM

## 2024-10-24 DIAGNOSIS — Z09 HOSPITAL DISCHARGE FOLLOW-UP: ICD-10-CM

## 2024-10-24 DIAGNOSIS — G89.29 CHRONIC BILATERAL LOW BACK PAIN WITHOUT SCIATICA: ICD-10-CM

## 2024-10-24 LAB
ALBUMIN SERPL-MCNC: 4.2 G/DL (ref 3.5–5.2)
ALBUMIN/GLOB SERPL: 1.2 G/DL
ALP SERPL-CCNC: 65 U/L (ref 39–117)
ALT SERPL W P-5'-P-CCNC: 19 U/L (ref 1–41)
ANION GAP SERPL CALCULATED.3IONS-SCNC: 12 MMOL/L (ref 5–15)
AST SERPL-CCNC: 15 U/L (ref 1–40)
BASOPHILS # BLD AUTO: 0.16 10*3/MM3 (ref 0–0.2)
BASOPHILS NFR BLD AUTO: 1.3 % (ref 0–1.5)
BILIRUB SERPL-MCNC: 0.3 MG/DL (ref 0–1.2)
BUN SERPL-MCNC: 9 MG/DL (ref 8–23)
BUN/CREAT SERPL: 11.3 (ref 7–25)
CALCIUM SPEC-SCNC: 9.9 MG/DL (ref 8.6–10.5)
CHLORIDE SERPL-SCNC: 101 MMOL/L (ref 98–107)
CO2 SERPL-SCNC: 27 MMOL/L (ref 22–29)
CREAT SERPL-MCNC: 0.8 MG/DL (ref 0.76–1.27)
DEPRECATED RDW RBC AUTO: 40.1 FL (ref 37–54)
EGFRCR SERPLBLD CKD-EPI 2021: 100.1 ML/MIN/1.73
EOSINOPHIL # BLD AUTO: 0.19 10*3/MM3 (ref 0–0.4)
EOSINOPHIL NFR BLD AUTO: 1.6 % (ref 0.3–6.2)
ERYTHROCYTE [DISTWIDTH] IN BLOOD BY AUTOMATED COUNT: 12.4 % (ref 12.3–15.4)
GLOBULIN UR ELPH-MCNC: 3.5 GM/DL
GLUCOSE SERPL-MCNC: 96 MG/DL (ref 65–99)
HCT VFR BLD AUTO: 42.7 % (ref 37.5–51)
HGB BLD-MCNC: 14.3 G/DL (ref 13–17.7)
IMM GRANULOCYTES # BLD AUTO: 0.04 10*3/MM3 (ref 0–0.05)
IMM GRANULOCYTES NFR BLD AUTO: 0.3 % (ref 0–0.5)
LYMPHOCYTES # BLD AUTO: 4.66 10*3/MM3 (ref 0.7–3.1)
LYMPHOCYTES NFR BLD AUTO: 38.4 % (ref 19.6–45.3)
MCH RBC QN AUTO: 30.4 PG (ref 26.6–33)
MCHC RBC AUTO-ENTMCNC: 33.5 G/DL (ref 31.5–35.7)
MCV RBC AUTO: 90.7 FL (ref 79–97)
MONOCYTES # BLD AUTO: 0.94 10*3/MM3 (ref 0.1–0.9)
MONOCYTES NFR BLD AUTO: 7.8 % (ref 5–12)
NEUTROPHILS NFR BLD AUTO: 50.6 % (ref 42.7–76)
NEUTROPHILS NFR BLD AUTO: 6.13 10*3/MM3 (ref 1.7–7)
NRBC BLD AUTO-RTO: 0 /100 WBC (ref 0–0.2)
PLATELET # BLD AUTO: 711 10*3/MM3 (ref 140–450)
PMV BLD AUTO: 9 FL (ref 6–12)
POTASSIUM SERPL-SCNC: 4.9 MMOL/L (ref 3.5–5.2)
PROT SERPL-MCNC: 7.7 G/DL (ref 6–8.5)
RBC # BLD AUTO: 4.71 10*6/MM3 (ref 4.14–5.8)
SODIUM SERPL-SCNC: 140 MMOL/L (ref 136–145)
WBC NRBC COR # BLD AUTO: 12.12 10*3/MM3 (ref 3.4–10.8)

## 2024-10-24 PROCEDURE — 80053 COMPREHEN METABOLIC PANEL: CPT

## 2024-10-24 PROCEDURE — 85025 COMPLETE CBC W/AUTO DIFF WBC: CPT

## 2024-10-24 PROCEDURE — 36415 COLL VENOUS BLD VENIPUNCTURE: CPT

## 2024-10-25 DIAGNOSIS — D75.839 THROMBOCYTOSIS: Primary | ICD-10-CM

## 2024-10-27 LAB
QT INTERVAL: 299 MS
QT INTERVAL: 410 MS
QTC INTERVAL: 439 MS
QTC INTERVAL: 447 MS

## 2024-11-05 ENCOUNTER — LAB (OUTPATIENT)
Facility: HOSPITAL | Age: 64
End: 2024-11-05
Payer: COMMERCIAL

## 2024-11-05 DIAGNOSIS — D75.839 THROMBOCYTOSIS: ICD-10-CM

## 2024-11-05 LAB
BASOPHILS # BLD AUTO: 0.14 10*3/MM3 (ref 0–0.2)
BASOPHILS NFR BLD AUTO: 1 % (ref 0–1.5)
DEPRECATED RDW RBC AUTO: 42.4 FL (ref 37–54)
EOSINOPHIL # BLD AUTO: 0.33 10*3/MM3 (ref 0–0.4)
EOSINOPHIL NFR BLD AUTO: 2.3 % (ref 0.3–6.2)
ERYTHROCYTE [DISTWIDTH] IN BLOOD BY AUTOMATED COUNT: 12.8 % (ref 12.3–15.4)
HCT VFR BLD AUTO: 40.1 % (ref 37.5–51)
HGB BLD-MCNC: 13.1 G/DL (ref 13–17.7)
IMM GRANULOCYTES # BLD AUTO: 0.05 10*3/MM3 (ref 0–0.05)
IMM GRANULOCYTES NFR BLD AUTO: 0.3 % (ref 0–0.5)
LYMPHOCYTES # BLD AUTO: 5.96 10*3/MM3 (ref 0.7–3.1)
LYMPHOCYTES NFR BLD AUTO: 41 % (ref 19.6–45.3)
MCH RBC QN AUTO: 29.6 PG (ref 26.6–33)
MCHC RBC AUTO-ENTMCNC: 32.7 G/DL (ref 31.5–35.7)
MCV RBC AUTO: 90.7 FL (ref 79–97)
MONOCYTES # BLD AUTO: 1.05 10*3/MM3 (ref 0.1–0.9)
MONOCYTES NFR BLD AUTO: 7.2 % (ref 5–12)
NEUTROPHILS NFR BLD AUTO: 48.2 % (ref 42.7–76)
NEUTROPHILS NFR BLD AUTO: 7.02 10*3/MM3 (ref 1.7–7)
NRBC BLD AUTO-RTO: 0 /100 WBC (ref 0–0.2)
PLATELET # BLD AUTO: 526 10*3/MM3 (ref 140–450)
PMV BLD AUTO: 9.6 FL (ref 6–12)
RBC # BLD AUTO: 4.42 10*6/MM3 (ref 4.14–5.8)
WBC NRBC COR # BLD AUTO: 14.55 10*3/MM3 (ref 3.4–10.8)

## 2024-11-05 PROCEDURE — 36415 COLL VENOUS BLD VENIPUNCTURE: CPT

## 2024-11-05 PROCEDURE — 85025 COMPLETE CBC W/AUTO DIFF WBC: CPT

## 2024-11-07 ENCOUNTER — OFFICE VISIT (OUTPATIENT)
Dept: FAMILY MEDICINE CLINIC | Facility: CLINIC | Age: 64
End: 2024-11-07
Payer: COMMERCIAL

## 2024-11-07 VITALS
WEIGHT: 204 LBS | BODY MASS INDEX: 30.92 KG/M2 | TEMPERATURE: 97.5 F | DIASTOLIC BLOOD PRESSURE: 64 MMHG | RESPIRATION RATE: 16 BRPM | SYSTOLIC BLOOD PRESSURE: 142 MMHG | OXYGEN SATURATION: 98 % | HEIGHT: 68 IN | HEART RATE: 86 BPM

## 2024-11-07 DIAGNOSIS — D75.839 THROMBOCYTOSIS: ICD-10-CM

## 2024-11-07 DIAGNOSIS — E11.9 TYPE 2 DIABETES MELLITUS WITHOUT COMPLICATION, WITHOUT LONG-TERM CURRENT USE OF INSULIN: Primary | ICD-10-CM

## 2024-11-07 DIAGNOSIS — G89.29 CHRONIC BILATERAL LOW BACK PAIN WITHOUT SCIATICA: ICD-10-CM

## 2024-11-07 DIAGNOSIS — F17.200 SMOKING: ICD-10-CM

## 2024-11-07 DIAGNOSIS — M54.50 CHRONIC BILATERAL LOW BACK PAIN WITHOUT SCIATICA: ICD-10-CM

## 2024-11-07 PROCEDURE — 99214 OFFICE O/P EST MOD 30 MIN: CPT | Performed by: STUDENT IN AN ORGANIZED HEALTH CARE EDUCATION/TRAINING PROGRAM

## 2024-11-07 RX ORDER — MELOXICAM 15 MG/1
15 TABLET ORAL DAILY PRN
Qty: 30 TABLET | Refills: 0 | Status: SHIPPED | OUTPATIENT
Start: 2024-11-07

## 2024-11-07 NOTE — PROGRESS NOTES
"Chief Complaint  Diabetes    Subjective         Nba Gaffney is a 64 y.o. male who presents to [facility]    64 years old comes to the clinic today to follow-up.    Type 2 diabetes; compliant with metformin and lifestyle modifications.    Patient is also compliant with Lipitor and aspirin.    Patient has been following up with cardiologist, had negative cardiac cath.  Compliant with metoprolol.    Following up with pain management for chronic pain, uncontrolled.    12+ review of systems are unremarkable otherwise    Objective   Vital Signs:   Vitals:    11/07/24 1603   BP: 142/64   BP Location: Left arm   Patient Position: Sitting   Cuff Size: Adult   Pulse: 86   Resp: 16   Temp: 97.5 °F (36.4 °C)   TempSrc: Temporal   SpO2: 98%   Weight: 92.5 kg (204 lb)   Height: 172.7 cm (68\")   PainSc: 4  Comment: back pain      Body mass index is 31.02 kg/m².   Wt Readings from Last 3 Encounters:   11/07/24 92.5 kg (204 lb)   10/23/24 92.2 kg (203 lb 3.2 oz)   10/08/24 95.3 kg (210 lb)      BP Readings from Last 3 Encounters:   11/07/24 142/64   10/23/24 139/76   10/08/24 162/86        Patient Care Team:  Eugene Campos MD as PCP - General (Family Medicine)  Yuval Snider MD as Consulting Physician (Hematology and Oncology)     Physical Exam  Vitals reviewed.   Constitutional:       Appearance: Normal appearance. He is well-developed.   HENT:      Head: Normocephalic and atraumatic.      Right Ear: External ear normal.      Left Ear: External ear normal.      Mouth/Throat:      Pharynx: No oropharyngeal exudate.   Eyes:      Conjunctiva/sclera: Conjunctivae normal.      Pupils: Pupils are equal, round, and reactive to light.   Cardiovascular:      Rate and Rhythm: Normal rate and regular rhythm.      Heart sounds: No murmur heard.     No friction rub. No gallop.   Pulmonary:      Effort: Pulmonary effort is normal.      Breath sounds: Normal breath sounds. No wheezing or rhonchi. "   Abdominal:      General: Bowel sounds are normal. There is no distension.      Palpations: Abdomen is soft.      Tenderness: There is no abdominal tenderness.   Skin:     General: Skin is warm and dry.   Neurological:      Mental Status: He is alert and oriented to person, place, and time.      Cranial Nerves: No cranial nerve deficit.   Psychiatric:         Mood and Affect: Mood and affect normal.         Behavior: Behavior normal.         Thought Content: Thought content normal.         Judgment: Judgment normal.                          Assessment and Plan   Diagnoses and all orders for this visit:    1. Type 2 diabetes mellitus without complication, without long-term current use of insulin (Primary)  Comments:  Chronic, controlled, continue with metformin any new A1c needed.  Orders:  -     TSH Rfx On Abnormal To Free T4; Future  -     CBC & Differential; Future  -     Comprehensive Metabolic Panel; Future  -     Hemoglobin A1c; Future  -     Lipid Panel; Future  -     Microalbumin / Creatinine Urine Ratio - Urine, Clean Catch    2. Thrombocytosis  Comments:  Repeat blood work within next 3 to 4 weeks  Orders:  -     TSH Rfx On Abnormal To Free T4; Future  -     CBC & Differential; Future  -     Comprehensive Metabolic Panel; Future  -     Hemoglobin A1c; Future  -     Lipid Panel; Future  -     Microalbumin / Creatinine Urine Ratio - Urine, Clean Catch    3. Smoking  Comments:  Smoking cessation discussed, last chest CT reviewed  Orders:  -     TSH Rfx On Abnormal To Free T4; Future  -     CBC & Differential; Future  -     Comprehensive Metabolic Panel; Future  -     Hemoglobin A1c; Future  -     Lipid Panel; Future  -     Microalbumin / Creatinine Urine Ratio - Urine, Clean Catch    4. Chronic bilateral low back pain without sciatica  Comments:  Mobic prescribed for as needed use with precautions due to cardiac history.  Orders:  -     meloxicam (Mobic) 15 MG tablet; Take 1 tablet by mouth Daily As Needed  for Moderate Pain.  Dispense: 30 tablet; Refill: 0          Tobacco Use: High Risk (11/7/2024)    Patient History    • Smoking Tobacco Use: Every Day    • Smokeless Tobacco Use: Never    • Passive Exposure: Current            Follow Up   Return in about 6 months (around 5/7/2025) for Next scheduled follow up.  Patient was given instructions and counseling regarding his condition or for health maintenance advice. Please see specific information pulled into the AVS if appropriate.

## 2025-01-06 DIAGNOSIS — E11.9 TYPE 2 DIABETES MELLITUS WITHOUT COMPLICATION, WITHOUT LONG-TERM CURRENT USE OF INSULIN: ICD-10-CM

## 2025-01-06 DIAGNOSIS — I25.2 HISTORY OF ST ELEVATION MYOCARDIAL INFARCTION (STEMI): ICD-10-CM

## 2025-01-06 RX ORDER — ATORVASTATIN CALCIUM 40 MG/1
40 TABLET, FILM COATED ORAL NIGHTLY
Qty: 90 TABLET | Refills: 1 | Status: SHIPPED | OUTPATIENT
Start: 2025-01-06

## 2025-02-05 ENCOUNTER — TELEPHONE (OUTPATIENT)
Dept: FAMILY MEDICINE CLINIC | Facility: CLINIC | Age: 65
End: 2025-02-05
Payer: COMMERCIAL

## 2025-02-05 DIAGNOSIS — E11.9 TYPE 2 DIABETES MELLITUS WITHOUT COMPLICATION, WITHOUT LONG-TERM CURRENT USE OF INSULIN: Primary | ICD-10-CM

## 2025-03-26 DIAGNOSIS — I25.2 HISTORY OF ST ELEVATION MYOCARDIAL INFARCTION (STEMI): ICD-10-CM

## 2025-03-26 DIAGNOSIS — E11.9 TYPE 2 DIABETES MELLITUS WITHOUT COMPLICATION, WITHOUT LONG-TERM CURRENT USE OF INSULIN: ICD-10-CM

## 2025-03-26 RX ORDER — METOPROLOL SUCCINATE 25 MG/1
25 TABLET, EXTENDED RELEASE ORAL DAILY
Qty: 30 TABLET | Refills: 0 | Status: SHIPPED | OUTPATIENT
Start: 2025-03-26

## 2025-04-03 NOTE — PROGRESS NOTES
Subjective:        Nba Gaffney is a 64 y.o. male who here for follow up    No chief complaint on file.      HPI  This is a 64-year-old male known with this provider with hypertension, hyperlipidemia, seen in office today in follow up for 6month follow up. He denies chest pain or shortness of breath.     Echo 8/2024 EF 46-50%, LVC mildly dilated, normal LV diastolic function. LAC mildly dilated.    Stress test 8/2024 moderate inferior wall infarct with mild randee-infarct ischemia, severe LV wall hypokinesis.     Cardiac cath 9/2024 left main distally 20-30% stenosis bifurcating into LAD and circumflex in normal fashion. LAD 40-50% stenosis including diagonal and  branches. Lcx ostial 30-40% stenosis midportion 50% stenosis, nondominant. RCA 50-60% stenosis with RFR 0.9, midportion stent widely patent distally moderate CAD.   Occasional Left chest pain thumb in the rib pinpoint, occasional right arm numbness    The following portions of the patient's history were reviewed and updated as appropriate: allergies, current medications, past family history, past medical history, past social history, past surgical history and problem list.    Past Medical History:   Diagnosis Date    Arthritis     Coronary artery disease     Diabetes mellitus     Elevated cholesterol     Environmental allergies     SEASONAL ALLERGIES    History of transfusion     Hyperlipidemia     Hypertension     Incisional hernia 2008    Lumbago     LOW BACK PAIN    Lumbar disc herniation 03/17/2014    L4-5    Lumbar spinal stenosis 03/17/2014    MI (myocardial infarction) 07/28/2021    SURGERY - 2 STENTS PLACED  7-2021- SEE'S DR Gonzales, DENIED  CP/SOB    Shoulder pain, left          reports that he has been smoking cigarettes. He started smoking about 46 years ago. He has a 23.5 pack-year smoking history. He has been exposed to tobacco smoke. He has never used smokeless tobacco. He reports that he does not currently use alcohol. He  reports that he does not currently use drugs.     Family History   Problem Relation Age of Onset    Malig Hyperthermia Neg Hx              Objective:           Vitals and nursing note reviewed.   Constitutional:       Appearance: Well-developed.   HENT:      Head: Normocephalic.      Right Ear: External ear normal.      Left Ear: External ear normal.   Neck:      Vascular: No JVD.   Pulmonary:      Effort: Pulmonary effort is normal. No respiratory distress.      Breath sounds: Normal breath sounds. No stridor. No rales.   Cardiovascular:      Normal rate. Regular rhythm.      No gallop.    Pulses:     Intact distal pulses.   Edema:     Peripheral edema absent.   Abdominal:      General: Bowel sounds are normal. There is no distension.      Palpations: Abdomen is soft.      Tenderness: There is no abdominal tenderness. There is no guarding.   Musculoskeletal: Normal range of motion.         General: No tenderness.      Cervical back: Normal range of motion. Skin:     General: Skin is warm.   Neurological:      Mental Status: Alert and oriented to person, place, and time.      Deep Tendon Reflexes: Reflexes are normal and symmetric.   Psychiatric:         Judgment: Judgment normal.         Procedures    Hemodynamics    Pressures    Ao: 110/70 mmHg   LV: 110/6 mmHg  End-diastolic pressure: 6 mmHg  No significant aortic valvular gradient on pullback    Coronary Angiography    Left Main: The left main originates in the left coronary cusp. It bifurcates and gives rise to the LAD and circumflex system. Left main distal 20 to 30% stenosis    Left Anterior Descending: Left anterior descending diffuse 40 to 50% stenosis including the diagonal and  branches    Left Circumflex: Circumflex artery ostial 30 to 40% stenosis midportion 50% stenosis, nondominant    Right Coronary Artery: The right coronary artery originates in the right coronary cusp. Right coronary artery ostial 50 to 60% stenosis with RFR of 0.9,  midportion stent widely patent distally moderate coronary artery disease    Left Ventriculography:       Impression:    Moderate coronary artery disease  Ostial RCA with RFR of 0.9  RCA stent widely patent    Recommendations:    Medical management        I sincerely appreciate the opportunity to participate in your patient's care. Please feel free to contact me anytime if I can be of assistance in this or any other way.    Yulissa Gonzales MD  9/6/2024  09:09 EDT    Interpretation Summary         Findings consistent with an abnormal ECG stress test.    Impressions are consistent with a high risk study.    Left ventricular ejection fraction is moderately reduced (Calculated EF = 26%).    Abnormal LV wall motion consistent with severe hypokinesis.    Moderate risk for ischemic heart disease.    Myocardial perfusion imaging indicates a moderate-sized infarct located in the inferior wall with mild randee-infarct ischemia.    Interpretation Summary         Left ventricular ejection fraction appears to be 46 - 50%.    The left ventricular cavity is mildly dilated.    Left ventricular diastolic function was normal.    The left atrial cavity is mildly dilated.    Left atrial volume is mildly increased.    Estimated right ventricular systolic pressure from tricuspid regurgitation is normal (<35 mmHg).         Current Outpatient Medications:     acetaminophen (TYLENOL) 650 MG 8 hr tablet, Take 1 tablet by mouth Every 8 (Eight) Hours As Needed for Mild Pain., Disp: , Rfl:     aspirin (Aspirin Low Dose) 81 MG EC tablet, Take 1 tablet by mouth Daily., Disp: 90 tablet, Rfl: 1    atorvastatin (LIPITOR) 40 MG tablet, Take 1 tablet by mouth Every Night., Disp: 90 tablet, Rfl: 1    gabapentin (NEURONTIN) 100 MG capsule, Take 1 capsule by mouth 2 (Two) Times a Day., Disp: 60 capsule, Rfl: 0    meloxicam (Mobic) 15 MG tablet, Take 1 tablet by mouth Daily As Needed for Moderate Pain., Disp: 30 tablet, Rfl: 0    metFORMIN (GLUCOPHAGE)  850 MG tablet, Take 1 tablet by mouth 2 (Two) Times a Day With Meals., Disp: 180 tablet, Rfl: 1    methocarbamol (ROBAXIN) 750 MG tablet, Take 1 tablet by mouth 4 (Four) Times a Day As Needed for Muscle Spasms., Disp: 30 tablet, Rfl: 1    metoprolol succinate XL (TOPROL-XL) 25 MG 24 hr tablet, Take 1 tablet by mouth Daily. Needs appt, Disp: 30 tablet, Rfl: 0    multivitamin with minerals (MULTIVITAMIN ADULT PO), Take 1 tablet by mouth Daily., Disp: , Rfl:      Assessment:        Patient Active Problem List   Diagnosis    Traumatic complete tear of left rotator cuff    S/P rotator cuff surgery    Arthrofibrosis of left shoulder    Post-operative state    MI (myocardial infarction)    Coronary artery disease involving native coronary artery of native heart without angina pectoris    Tobacco use    History of ST elevation myocardial infarction (STEMI)    Stented coronary artery    Type 2 diabetes mellitus without complication, without long-term current use of insulin    Gastroesophageal reflux disease without esophagitis    Pulmonary nodule    Smoker    Positive colorectal cancer screening using Cologuard test    Screening for colon cancer    Environmental allergies    Hyperlipidemia    Hypertension    Incisional hernia    Lumbago    Lumbar disc herniation    Lumbar spinal stenosis    Shoulder pain, left    Abnormal nuclear stress test    Bacteremia due to Streptococcus pneumoniae               Plan:   Coronary artery disease: no chest pain, continue aspirin, atorvastatin, metoprolol    Risk reduction for the coronary artery disease, controlling the blood pressure, blood sugar management, cholesterol management, exercise, stress management, and proper compliance with medications and follow-up has been discussed    2. Hypertension: BP today in office 131/80, controlled, continue metoprolol    Importance of controlling hypertension and blood pressure  checkup on the regular basis has been explained. Hypertension as a  silent killer has been discussed. Risk reduction of the weight and regular exercises to control the hypertension has been explained.    3.  Hyperlipidemia: Lipid panel 5/2024 , HDL 43, LDL 60, trig 100, ast/alt wnl, good control. Continue atorvastatin    Risk of the hyperlipidemia, importance of the treatment has been explained. Pros and cons of the statins has been explained. Regular blood workup as well as side effects including the liver failure, myelopathy death has been explained.                 No diagnosis found.    There are no diagnoses linked to this encounter.    COUNSELING: sukhwinder Marie was given to patient for the following topics: diagnostic results, risk factor reductions, impressions, risks and benefits of treatment options and importance of treatment compliance .       SMOKING COUNSELING: smokes, encouraged cessation    Follow up in 1yr or sooner if needed    Sincerely,   QUAN Jones  [facility]  04/04/25  14:51 EDT    EMR Dragon/Transcription disclaimer:   Much of this encounter note is an electronic transcription/translation of spoken language to printed text. The electronic translation of spoken language may permit erroneous, or at times, nonsensical words or phrases to be inadvertently transcribed; Although I have reviewed the note for such errors, some may still exist.

## 2025-04-04 ENCOUNTER — OFFICE VISIT (OUTPATIENT)
Dept: CARDIOLOGY | Facility: CLINIC | Age: 65
End: 2025-04-04
Payer: COMMERCIAL

## 2025-04-04 VITALS
HEIGHT: 68 IN | BODY MASS INDEX: 32.43 KG/M2 | HEART RATE: 80 BPM | WEIGHT: 214 LBS | DIASTOLIC BLOOD PRESSURE: 80 MMHG | SYSTOLIC BLOOD PRESSURE: 131 MMHG

## 2025-04-04 DIAGNOSIS — I25.10 CORONARY ARTERY DISEASE INVOLVING NATIVE CORONARY ARTERY OF NATIVE HEART WITHOUT ANGINA PECTORIS: Primary | ICD-10-CM

## 2025-04-04 DIAGNOSIS — E78.5 HYPERLIPIDEMIA, UNSPECIFIED HYPERLIPIDEMIA TYPE: ICD-10-CM

## 2025-04-04 DIAGNOSIS — I10 HYPERTENSION, UNSPECIFIED TYPE: ICD-10-CM

## 2025-04-04 PROCEDURE — 99214 OFFICE O/P EST MOD 30 MIN: CPT

## 2025-04-04 RX ORDER — METOPROLOL SUCCINATE 25 MG/1
25 TABLET, EXTENDED RELEASE ORAL DAILY
Qty: 90 TABLET | Refills: 3 | Status: SHIPPED | OUTPATIENT
Start: 2025-04-04

## 2025-04-21 DIAGNOSIS — E11.9 TYPE 2 DIABETES MELLITUS WITHOUT COMPLICATION, WITHOUT LONG-TERM CURRENT USE OF INSULIN: ICD-10-CM

## 2025-04-21 DIAGNOSIS — I25.2 HISTORY OF ST ELEVATION MYOCARDIAL INFARCTION (STEMI): ICD-10-CM

## 2025-04-22 NOTE — TELEPHONE ENCOUNTER
Upcoming Appts  With Family Medicine (Eugene Campos MD)  05/07/2025 at 4:15 PM  Last Office Visit - This Dept  11/7/2024 Eugene Campos MD

## 2025-05-07 ENCOUNTER — OFFICE VISIT (OUTPATIENT)
Dept: FAMILY MEDICINE CLINIC | Facility: CLINIC | Age: 65
End: 2025-05-07
Payer: COMMERCIAL

## 2025-05-07 VITALS
DIASTOLIC BLOOD PRESSURE: 76 MMHG | OXYGEN SATURATION: 96 % | WEIGHT: 210.9 LBS | HEART RATE: 80 BPM | SYSTOLIC BLOOD PRESSURE: 138 MMHG | HEIGHT: 68 IN | BODY MASS INDEX: 31.96 KG/M2 | RESPIRATION RATE: 19 BRPM | TEMPERATURE: 97.2 F

## 2025-05-07 DIAGNOSIS — Z00.00 ANNUAL PHYSICAL EXAM: Primary | ICD-10-CM

## 2025-05-07 DIAGNOSIS — Z12.11 SCREENING FOR COLON CANCER: ICD-10-CM

## 2025-05-07 DIAGNOSIS — Z12.5 SCREENING FOR PROSTATE CANCER: ICD-10-CM

## 2025-05-07 DIAGNOSIS — F17.200 SMOKING ADDICTION: ICD-10-CM

## 2025-05-07 DIAGNOSIS — E11.9 TYPE 2 DIABETES MELLITUS WITHOUT COMPLICATION, WITHOUT LONG-TERM CURRENT USE OF INSULIN: ICD-10-CM

## 2025-05-07 NOTE — PROGRESS NOTES
"Chief Complaint  Annual physical    Subjective         Nba Gaffney is a 64 y.o. male who presents to [facility]    64 years old comes to the clinic today for annual physical.    Diabetes type 2; patient is compliant with metformin, very active physically.    Compliant with Lipitor and aspirin/metoprolol.    Due for colonoscopy and low-dose chest CT.    Patient forgot to do the blood work before this visit    Objective   Vital Signs:   Vitals:    05/07/25 1616   BP: 138/76   Pulse: 80   Resp: 19   Temp: 97.2 °F (36.2 °C)   SpO2: 96%   Weight: 95.7 kg (210 lb 14.4 oz)   Height: 172.7 cm (68\")      Body mass index is 32.07 kg/m².   Wt Readings from Last 3 Encounters:   05/07/25 95.7 kg (210 lb 14.4 oz)   04/04/25 97.1 kg (214 lb)   11/07/24 92.5 kg (204 lb)      BP Readings from Last 3 Encounters:   05/07/25 138/76   04/04/25 131/80   11/07/24 142/64        Patient Care Team:  Eugene Campos MD as PCP - General (Family Medicine)  Yuval Snider MD as Consulting Physician (Hematology and Oncology)     Physical Exam  Vitals reviewed.   Constitutional:       Appearance: Normal appearance. He is well-developed.   HENT:      Head: Normocephalic and atraumatic.      Right Ear: External ear normal.      Left Ear: External ear normal.      Mouth/Throat:      Pharynx: No oropharyngeal exudate.   Eyes:      Conjunctiva/sclera: Conjunctivae normal.      Pupils: Pupils are equal, round, and reactive to light.   Cardiovascular:      Rate and Rhythm: Normal rate and regular rhythm.      Heart sounds: No murmur heard.     No friction rub. No gallop.   Pulmonary:      Effort: Pulmonary effort is normal.      Breath sounds: Normal breath sounds. No wheezing or rhonchi.   Abdominal:      General: Bowel sounds are normal. There is no distension.      Palpations: Abdomen is soft.      Tenderness: There is no abdominal tenderness.   Skin:     General: Skin is warm and dry.   Neurological:      " Mental Status: He is alert and oriented to person, place, and time.      Cranial Nerves: No cranial nerve deficit.   Psychiatric:         Mood and Affect: Mood and affect normal.         Behavior: Behavior normal.         Thought Content: Thought content normal.         Judgment: Judgment normal.                            Assessment and Plan   Diagnoses and all orders for this visit:    1. Annual physical exam (Primary)  Comments:  Daily exercise and healthy diet recommended    2. Screening for colon cancer  -     Ambulatory Referral For Screening Colonoscopy  -     CBC & Differential; Future  -     Comprehensive Metabolic Panel; Future  -     Hemoglobin A1c; Future  -     Microalbumin / Creatinine Urine Ratio - Urine, Clean Catch; Future  -     PSA SCREENING; Future    3. Type 2 diabetes mellitus without complication, without long-term current use of insulin  Comments:  Chronic, controlled on metformin.  New A1c needed, plan change after A1c if needed  Orders:  -     Microalbumin / Creatinine Urine Ratio - Urine, Clean Catch  -     CBC & Differential; Future  -     Comprehensive Metabolic Panel; Future  -     Hemoglobin A1c; Future  -     Microalbumin / Creatinine Urine Ratio - Urine, Clean Catch; Future  -     PSA SCREENING; Future    4. Smoking addiction  -      CT Chest Low Dose Cancer Screening WO; Future  -     CBC & Differential; Future  -     Comprehensive Metabolic Panel; Future  -     Hemoglobin A1c; Future  -     Microalbumin / Creatinine Urine Ratio - Urine, Clean Catch; Future  -     PSA SCREENING; Future    5. Screening for prostate cancer  -     CBC & Differential; Future  -     Comprehensive Metabolic Panel; Future  -     Hemoglobin A1c; Future  -     Microalbumin / Creatinine Urine Ratio - Urine, Clean Catch; Future  -     PSA SCREENING; Future          Tobacco Use: High Risk (5/7/2025)    Patient History     Smoking Tobacco Use: Every Day     Smokeless Tobacco Use: Never     Passive Exposure:  Current            Follow Up   Return in about 6 months (around 11/7/2025) for Next scheduled follow up.  Patient was given instructions and counseling regarding his condition or for health maintenance advice. Please see specific information pulled into the AVS if appropriate.

## 2025-05-09 ENCOUNTER — TELEPHONE (OUTPATIENT)
Dept: FAMILY MEDICINE CLINIC | Facility: CLINIC | Age: 65
End: 2025-05-09
Payer: COMMERCIAL

## 2025-05-09 DIAGNOSIS — Z12.12 ENCOUNTER FOR COLORECTAL CANCER SCREENING USING COLOGUARD TEST: Primary | ICD-10-CM

## 2025-05-09 DIAGNOSIS — Z12.11 ENCOUNTER FOR COLORECTAL CANCER SCREENING USING COLOGUARD TEST: Primary | ICD-10-CM

## 2025-05-23 ENCOUNTER — LAB (OUTPATIENT)
Facility: HOSPITAL | Age: 65
End: 2025-05-23
Payer: COMMERCIAL

## 2025-05-23 DIAGNOSIS — D75.839 THROMBOCYTOSIS: ICD-10-CM

## 2025-05-23 DIAGNOSIS — E11.9 TYPE 2 DIABETES MELLITUS WITHOUT COMPLICATION, WITHOUT LONG-TERM CURRENT USE OF INSULIN: ICD-10-CM

## 2025-05-23 DIAGNOSIS — IMO0001 SMOKING: ICD-10-CM

## 2025-05-23 LAB
ALBUMIN SERPL-MCNC: 4.6 G/DL (ref 3.5–5.2)
ALBUMIN UR-MCNC: <1.2 MG/DL
ALBUMIN/GLOB SERPL: 1.6 G/DL
ALP SERPL-CCNC: 65 U/L (ref 39–117)
ALT SERPL W P-5'-P-CCNC: 30 U/L (ref 1–41)
ANION GAP SERPL CALCULATED.3IONS-SCNC: 13.1 MMOL/L (ref 5–15)
AST SERPL-CCNC: 23 U/L (ref 1–40)
BASOPHILS # BLD AUTO: 0.16 10*3/MM3 (ref 0–0.2)
BASOPHILS NFR BLD AUTO: 1.3 % (ref 0–1.5)
BILIRUB SERPL-MCNC: <0.2 MG/DL (ref 0–1.2)
BUN SERPL-MCNC: 15 MG/DL (ref 8–23)
BUN/CREAT SERPL: 19 (ref 7–25)
CALCIUM SPEC-SCNC: 9.5 MG/DL (ref 8.6–10.5)
CHLORIDE SERPL-SCNC: 102 MMOL/L (ref 98–107)
CHOLEST SERPL-MCNC: 144 MG/DL (ref 0–200)
CO2 SERPL-SCNC: 21.9 MMOL/L (ref 22–29)
CREAT SERPL-MCNC: 0.79 MG/DL (ref 0.76–1.27)
CREAT UR-MCNC: 21.1 MG/DL
DEPRECATED RDW RBC AUTO: 44.6 FL (ref 37–54)
EGFRCR SERPLBLD CKD-EPI 2021: 100.4 ML/MIN/1.73
EOSINOPHIL # BLD AUTO: 0.28 10*3/MM3 (ref 0–0.4)
EOSINOPHIL NFR BLD AUTO: 2.3 % (ref 0.3–6.2)
ERYTHROCYTE [DISTWIDTH] IN BLOOD BY AUTOMATED COUNT: 13.3 % (ref 12.3–15.4)
GLOBULIN UR ELPH-MCNC: 2.8 GM/DL
GLUCOSE SERPL-MCNC: 91 MG/DL (ref 65–99)
HBA1C MFR BLD: 6.1 % (ref 4.8–5.6)
HCT VFR BLD AUTO: 43.3 % (ref 37.5–51)
HDLC SERPL-MCNC: 38 MG/DL (ref 40–60)
HGB BLD-MCNC: 15.2 G/DL (ref 13–17.7)
IMM GRANULOCYTES # BLD AUTO: 0.04 10*3/MM3 (ref 0–0.05)
IMM GRANULOCYTES NFR BLD AUTO: 0.3 % (ref 0–0.5)
LDLC SERPL CALC-MCNC: 70 MG/DL (ref 0–100)
LDLC/HDLC SERPL: 1.64 {RATIO}
LYMPHOCYTES # BLD AUTO: 4.93 10*3/MM3 (ref 0.7–3.1)
LYMPHOCYTES NFR BLD AUTO: 39.6 % (ref 19.6–45.3)
MCH RBC QN AUTO: 31.9 PG (ref 26.6–33)
MCHC RBC AUTO-ENTMCNC: 35.1 G/DL (ref 31.5–35.7)
MCV RBC AUTO: 91 FL (ref 79–97)
MICROALBUMIN/CREAT UR: NORMAL MG/G{CREAT}
MONOCYTES # BLD AUTO: 1.01 10*3/MM3 (ref 0.1–0.9)
MONOCYTES NFR BLD AUTO: 8.1 % (ref 5–12)
NEUTROPHILS NFR BLD AUTO: 48.4 % (ref 42.7–76)
NEUTROPHILS NFR BLD AUTO: 6.02 10*3/MM3 (ref 1.7–7)
NRBC BLD AUTO-RTO: 0 /100 WBC (ref 0–0.2)
PLATELET # BLD AUTO: 385 10*3/MM3 (ref 140–450)
PMV BLD AUTO: 9.8 FL (ref 6–12)
POTASSIUM SERPL-SCNC: 4.1 MMOL/L (ref 3.5–5.2)
PROT SERPL-MCNC: 7.4 G/DL (ref 6–8.5)
RBC # BLD AUTO: 4.76 10*6/MM3 (ref 4.14–5.8)
SODIUM SERPL-SCNC: 137 MMOL/L (ref 136–145)
TRIGL SERPL-MCNC: 219 MG/DL (ref 0–150)
TSH SERPL DL<=0.05 MIU/L-ACNC: 2.79 UIU/ML (ref 0.27–4.2)
VLDLC SERPL-MCNC: 36 MG/DL (ref 5–40)
WBC NRBC COR # BLD AUTO: 12.44 10*3/MM3 (ref 3.4–10.8)

## 2025-05-23 PROCEDURE — 80061 LIPID PANEL: CPT

## 2025-05-23 PROCEDURE — 82570 ASSAY OF URINE CREATININE: CPT | Performed by: STUDENT IN AN ORGANIZED HEALTH CARE EDUCATION/TRAINING PROGRAM

## 2025-05-23 PROCEDURE — 36415 COLL VENOUS BLD VENIPUNCTURE: CPT

## 2025-05-23 PROCEDURE — 80050 GENERAL HEALTH PANEL: CPT

## 2025-05-23 PROCEDURE — 83036 HEMOGLOBIN GLYCOSYLATED A1C: CPT

## 2025-05-23 PROCEDURE — 82043 UR ALBUMIN QUANTITATIVE: CPT | Performed by: STUDENT IN AN ORGANIZED HEALTH CARE EDUCATION/TRAINING PROGRAM

## 2025-08-19 DIAGNOSIS — I25.2 HISTORY OF ST ELEVATION MYOCARDIAL INFARCTION (STEMI): ICD-10-CM

## 2025-08-19 DIAGNOSIS — E11.9 TYPE 2 DIABETES MELLITUS WITHOUT COMPLICATION, WITHOUT LONG-TERM CURRENT USE OF INSULIN: ICD-10-CM

## 2025-08-19 RX ORDER — ATORVASTATIN CALCIUM 40 MG/1
40 TABLET, FILM COATED ORAL NIGHTLY
Qty: 90 TABLET | Refills: 1 | Status: SHIPPED | OUTPATIENT
Start: 2025-08-19

## (undated) DEVICE — SUT VIC 3/0 SH 27IN J416H

## (undated) DEVICE — PK ARTHSCP SHLDR TOWER 40

## (undated) DEVICE — RUNTHROUGH NS EXTRA FLOPPY PTCA GUIDEWIRE: Brand: RUNTHROUGH

## (undated) DEVICE — CATH DIAG IMPULSE FR4 5F 100CM

## (undated) DEVICE — GLV SURG SENSICARE PI PF LF 7 GRN STRL

## (undated) DEVICE — APPL CHLORAPREP HI/LITE 26ML ORNG

## (undated) DEVICE — PK CATH CARD 40

## (undated) DEVICE — GLV SURG PREMIERPRO ORTHO LTX PF SZ8 BRN

## (undated) DEVICE — ARM SLING: Brand: DEROYAL

## (undated) DEVICE — DEV INDEFLATOR P/N 580289

## (undated) DEVICE — CATH DIAG IMPULSE FL3.5 5F 100CM

## (undated) DEVICE — KT MANIFLD CARDIAC

## (undated) DEVICE — GLIDESHEATH SLENDER STAINLESS STEEL KIT: Brand: GLIDESHEATH SLENDER

## (undated) DEVICE — SKIN PREP TRAY W/CHG: Brand: MEDLINE INDUSTRIES, INC.

## (undated) DEVICE — DGW .035 FC J3MM 260CM TEF: Brand: EMERALD

## (undated) DEVICE — SUT VIC 0 CT1 36IN J946H

## (undated) DEVICE — GLV SURG SIGNATURE ESSENTIAL PF LTX SZ8

## (undated) DEVICE — TREK CORONARY DILATATION CATHETER 2.50 MM X 12 MM / RAPID-EXCHANGE: Brand: TREK

## (undated) DEVICE — TR BAND RADIAL ARTERY COMPRESSION DEVICE: Brand: TR BAND

## (undated) DEVICE — GW PRESSUREWIRE X WIRELESS FFR 175CM

## (undated) DEVICE — INTENT TO BE USED WITH SUTURE MATERIAL FOR TISSUE CLOSURE: Brand: RICHARD-ALLAN® NEEDLE 1/2 CIRCLE TAPER

## (undated) DEVICE — SUT MNCRYL 4/0 PS2 18 IN

## (undated) DEVICE — TP NDL SCORPION SUREFIRE SD SLOT

## (undated) DEVICE — DRSNG WND GZ CURAD OIL EMULSION 3X3IN STRL

## (undated) DEVICE — CANN TRPL DAM 7X7MM NO VLV

## (undated) DEVICE — CATH DIAG IMPULSE ARMOD 5F 100CM

## (undated) DEVICE — GUIDE KT JUGGERKNOT SOFTANCHOR 2.9

## (undated) DEVICE — 6F .070 JR 4 100CM: Brand: CORDIS

## (undated) DEVICE — GW EMR FIX EXCHG J STD .035 3MM 260CM

## (undated) DEVICE — CATH4F INF PIG 145Â° MOD 110CM: Brand: INFINITI

## (undated) DEVICE — IMMOB SHLDR PAD2 UNIV LG

## (undated) DEVICE — DRAPE,U/ SHT,SPLIT,PLAS,STERIL: Brand: MEDLINE

## (undated) DEVICE — CATH DIAG IMPULSE AL1 5F 100CM

## (undated) DEVICE — CATH ASPIR EXPORTADVANCE 6F .014IN 140CM